# Patient Record
Sex: FEMALE | Race: ASIAN | Employment: OTHER | URBAN - METROPOLITAN AREA
[De-identification: names, ages, dates, MRNs, and addresses within clinical notes are randomized per-mention and may not be internally consistent; named-entity substitution may affect disease eponyms.]

---

## 2017-12-01 ENCOUNTER — HOSPITAL ENCOUNTER (OUTPATIENT)
Dept: LAB | Age: 67
Discharge: HOME | End: 2017-12-01
Payer: MEDICARE

## 2017-12-01 ENCOUNTER — OFFICE VISIT (OUTPATIENT)
Dept: FAMILY MEDICINE CLINIC | Age: 67
End: 2017-12-01

## 2017-12-01 VITALS
BODY MASS INDEX: 34.25 KG/M2 | TEMPERATURE: 98.1 F | WEIGHT: 181.4 LBS | SYSTOLIC BLOOD PRESSURE: 120 MMHG | HEART RATE: 54 BPM | RESPIRATION RATE: 17 BRPM | HEIGHT: 61 IN | OXYGEN SATURATION: 97 % | DIASTOLIC BLOOD PRESSURE: 80 MMHG

## 2017-12-01 DIAGNOSIS — I10 ESSENTIAL HYPERTENSION: ICD-10-CM

## 2017-12-01 DIAGNOSIS — Z78.0 POSTMENOPAUSAL: ICD-10-CM

## 2017-12-01 DIAGNOSIS — R74.01 TRANSAMINITIS: ICD-10-CM

## 2017-12-01 DIAGNOSIS — Z00.00 ROUTINE GENERAL MEDICAL EXAMINATION AT A HEALTH CARE FACILITY: ICD-10-CM

## 2017-12-01 DIAGNOSIS — E55.9 VITAMIN D DEFICIENCY: ICD-10-CM

## 2017-12-01 DIAGNOSIS — R73.9 ELEVATED BLOOD SUGAR: ICD-10-CM

## 2017-12-01 DIAGNOSIS — I10 ESSENTIAL HYPERTENSION: Primary | ICD-10-CM

## 2017-12-01 DIAGNOSIS — Z11.59 SPECIAL SCREENING EXAMINATION FOR VIRAL DISEASE: ICD-10-CM

## 2017-12-01 DIAGNOSIS — Z23 ENCOUNTER FOR IMMUNIZATION: ICD-10-CM

## 2017-12-01 DIAGNOSIS — Z12.39 SCREENING FOR BREAST CANCER: ICD-10-CM

## 2017-12-01 LAB
25(OH)D3 SERPL-MCNC: 20.1 NG/ML (ref 30–100)
ALBUMIN SERPL-MCNC: 3.9 G/DL (ref 3.4–5)
ALBUMIN/GLOB SERPL: 0.8 {RATIO} (ref 0.8–1.7)
ALP SERPL-CCNC: 75 U/L (ref 45–117)
ALT SERPL-CCNC: 29 U/L (ref 13–56)
ANION GAP SERPL CALC-SCNC: 8 MMOL/L (ref 3–18)
AST SERPL-CCNC: 39 U/L (ref 15–37)
BILIRUB SERPL-MCNC: 1.4 MG/DL (ref 0.2–1)
BUN SERPL-MCNC: 13 MG/DL (ref 7–18)
BUN/CREAT SERPL: 12 (ref 12–20)
CALCIUM SERPL-MCNC: 9.9 MG/DL (ref 8.5–10.1)
CHLORIDE SERPL-SCNC: 107 MMOL/L (ref 100–108)
CHOLEST SERPL-MCNC: 145 MG/DL
CO2 SERPL-SCNC: 26 MMOL/L (ref 21–32)
CREAT SERPL-MCNC: 1.12 MG/DL (ref 0.6–1.3)
ERYTHROCYTE [DISTWIDTH] IN BLOOD BY AUTOMATED COUNT: 14 % (ref 11.6–14.5)
GLOBULIN SER CALC-MCNC: 4.7 G/DL (ref 2–4)
GLUCOSE SERPL-MCNC: 86 MG/DL (ref 74–99)
HBA1C MFR BLD: 6 % (ref 4.2–5.6)
HCT VFR BLD AUTO: 43.1 % (ref 35–45)
HDLC SERPL-MCNC: 64 MG/DL (ref 40–60)
HDLC SERPL: 2.3 {RATIO} (ref 0–5)
HGB BLD-MCNC: 14 G/DL (ref 12–16)
LDLC SERPL CALC-MCNC: 58.2 MG/DL (ref 0–100)
LIPID PROFILE,FLP: ABNORMAL
MCH RBC QN AUTO: 29.7 PG (ref 24–34)
MCHC RBC AUTO-ENTMCNC: 32.5 G/DL (ref 31–37)
MCV RBC AUTO: 91.3 FL (ref 74–97)
PLATELET # BLD AUTO: 170 K/UL (ref 135–420)
PMV BLD AUTO: 10.8 FL (ref 9.2–11.8)
POTASSIUM SERPL-SCNC: 4 MMOL/L (ref 3.5–5.5)
PROT SERPL-MCNC: 8.6 G/DL (ref 6.4–8.2)
RBC # BLD AUTO: 4.72 M/UL (ref 4.2–5.3)
SODIUM SERPL-SCNC: 141 MMOL/L (ref 136–145)
TRIGL SERPL-MCNC: 114 MG/DL (ref ?–150)
VLDLC SERPL CALC-MCNC: 22.8 MG/DL
WBC # BLD AUTO: 6 K/UL (ref 4.6–13.2)

## 2017-12-01 PROCEDURE — 86803 HEPATITIS C AB TEST: CPT | Performed by: INTERNAL MEDICINE

## 2017-12-01 PROCEDURE — 83036 HEMOGLOBIN GLYCOSYLATED A1C: CPT | Performed by: INTERNAL MEDICINE

## 2017-12-01 PROCEDURE — 82306 VITAMIN D 25 HYDROXY: CPT | Performed by: INTERNAL MEDICINE

## 2017-12-01 PROCEDURE — 80053 COMPREHEN METABOLIC PANEL: CPT | Performed by: INTERNAL MEDICINE

## 2017-12-01 PROCEDURE — 80061 LIPID PANEL: CPT | Performed by: INTERNAL MEDICINE

## 2017-12-01 PROCEDURE — 36415 COLL VENOUS BLD VENIPUNCTURE: CPT | Performed by: INTERNAL MEDICINE

## 2017-12-01 PROCEDURE — 85027 COMPLETE CBC AUTOMATED: CPT | Performed by: INTERNAL MEDICINE

## 2017-12-01 RX ORDER — METOPROLOL TARTRATE 100 MG/1
TABLET ORAL 2 TIMES DAILY
COMMUNITY
End: 2017-12-01 | Stop reason: CLARIF

## 2017-12-01 RX ORDER — LORATADINE 10 MG/1
10 TABLET ORAL
Qty: 90 TAB | Refills: 3 | Status: SHIPPED | OUTPATIENT
Start: 2017-12-01 | End: 2018-09-13 | Stop reason: SDUPTHER

## 2017-12-01 RX ORDER — GUAIFENESIN AND DEXTROMETHORPHAN HYDROBROMIDE 1200; 60 MG/1; MG/1
TABLET, EXTENDED RELEASE ORAL
COMMUNITY
End: 2018-03-06 | Stop reason: ALTCHOICE

## 2017-12-01 RX ORDER — LOSARTAN POTASSIUM 100 MG/1
100 TABLET ORAL DAILY
Qty: 90 TAB | Refills: 1 | Status: SHIPPED | OUTPATIENT
Start: 2017-12-01 | End: 2018-05-01 | Stop reason: SDUPTHER

## 2017-12-01 RX ORDER — ACETAMINOPHEN 500 MG
500 TABLET ORAL
Qty: 90 TAB | Refills: 3 | Status: SHIPPED | OUTPATIENT
Start: 2017-12-01 | End: 2018-09-13 | Stop reason: ALTCHOICE

## 2017-12-01 RX ORDER — METOPROLOL SUCCINATE 100 MG/1
100 TABLET, EXTENDED RELEASE ORAL DAILY
Qty: 90 TAB | Refills: 1 | Status: SHIPPED | OUTPATIENT
Start: 2017-12-01 | End: 2018-05-01 | Stop reason: SDUPTHER

## 2017-12-01 RX ORDER — IBUPROFEN 800 MG/1
800 TABLET ORAL
Qty: 90 TAB | Refills: 1 | Status: SHIPPED | OUTPATIENT
Start: 2017-12-01 | End: 2020-08-17

## 2017-12-01 RX ORDER — IBUPROFEN 800 MG/1
TABLET ORAL
COMMUNITY
End: 2017-12-01 | Stop reason: SDUPTHER

## 2017-12-01 RX ORDER — CARBOXYMETHYLCELLULOSE SODIUM 5 MG/ML
1 SOLUTION/ DROPS OPHTHALMIC 2 TIMES DAILY
Qty: 30 ML | Refills: 11 | Status: SHIPPED | OUTPATIENT
Start: 2017-12-01 | End: 2018-09-13 | Stop reason: SDUPTHER

## 2017-12-01 RX ORDER — LOSARTAN POTASSIUM 100 MG/1
100 TABLET ORAL DAILY
COMMUNITY
End: 2017-12-01 | Stop reason: SDUPTHER

## 2017-12-01 RX ORDER — LORATADINE 10 MG/1
10 TABLET ORAL
COMMUNITY
End: 2017-12-01 | Stop reason: SDUPTHER

## 2017-12-01 RX ORDER — ACETAMINOPHEN 500 MG
TABLET ORAL
COMMUNITY
End: 2017-12-01 | Stop reason: SDUPTHER

## 2017-12-01 NOTE — PROGRESS NOTES
Eunice Quesada is a 78 y.o. female (: 1950) presenting to address:    Chief Complaint   Patient presents with    Establish Care       Vitals:    17 0956   BP: 120/80   Pulse: (!) 54   Resp: 17   Temp: 98.1 °F (36.7 °C)   TempSrc: Oral   SpO2: 97%   Weight: 181 lb 6.4 oz (82.3 kg)   Height: 5' 0.5\" (1.537 m)   PainSc:   4   PainLoc: Generalized       Hearing/Vision:   Vision Screening Comments: Patient refused. Just had eye exam    Learning Assessment:     Learning Assessment 2017   PRIMARY LEARNER Patient   HIGHEST LEVEL OF EDUCATION - PRIMARY LEARNER  4 YEARS OF COLLEGE   BARRIERS PRIMARY LEARNER NONE   CO-LEARNER CAREGIVER No   PRIMARY LANGUAGE OTHER (COMMENT)    NEED No   LEARNER PREFERENCE PRIMARY READING   LEARNING SPECIAL TOPICS none   ANSWERED BY patient   RELATIONSHIP SELF   ASSESSMENT COMMENT n/a     Depression Screening:     PHQ over the last two weeks 2017   Little interest or pleasure in doing things Not at all   Feeling down, depressed or hopeless Not at all   Total Score PHQ 2 0     Fall Risk Assessment:     Fall Risk Assessment, last 12 mths 2017   Able to walk? Yes   Fall in past 12 months? No     Abuse Screening:     Abuse Screening Questionnaire 2017   Do you ever feel afraid of your partner? N   Are you in a relationship with someone who physically or mentally threatens you? N   Is it safe for you to go home? Y       Advanced Directive:   1. Do you have an Advanced Directive? YES    2. Would you like information on Advanced Directives?  NO

## 2017-12-01 NOTE — PROGRESS NOTES
Immunization/s administered 12/1/2017 by Javon Cooley LPN with guardian's consent. Patient tolerated procedure well. No reactions noted.     Left deltoid, high dose influenza  Patient will return for future Pneumo 23

## 2017-12-01 NOTE — MR AVS SNAPSHOT
Visit Information Date & Time Provider Department Dept. Phone Encounter #  
 12/1/2017 10:00 AM Zoltan Lorenz, [employer] [phone] Upcoming Health Maintenance Date Due Hepatitis C Screening 1950 COLONOSCOPY 6/29/1969 BREAST CANCER SCRN MAMMOGRAM 6/29/2001 ZOSTER VACCINE AGE 60> 4/29/2011 OSTEOPOROSIS SCREENING (DEXA) 6/29/2016 Pneumococcal 65+ Low/Medium Risk (1 of 2 - PCV13) 6/29/2016 MEDICARE YEARLY EXAM 6/29/2016 Influenza Age 5 to Adult 8/1/2017 GLAUCOMA SCREENING Q2Y 11/17/2019 DTaP/Tdap/Td series (2 - Td) 12/1/2027 Allergies as of 12/1/2017  Never Reviewed Not on File Current Immunizations  Never Reviewed No immunizations on file. Not reviewed this visit You Were Diagnosed With   
  
 Codes Comments Essential hypertension    -  Primary ICD-10-CM: I10 
ICD-9-CM: 401.9 Elevated blood sugar     ICD-10-CM: R73.9 ICD-9-CM: 790.29 Transaminitis     ICD-10-CM: R74.0 ICD-9-CM: 790.4 Routine general medical examination at a health care facility     ICD-10-CM: Z00.00 ICD-9-CM: V70.0 Special screening examination for viral disease     ICD-10-CM: Z11.59 
ICD-9-CM: V73.99 Screening for breast cancer     ICD-10-CM: Z12.31 
ICD-9-CM: V76.10 Postmenopausal     ICD-10-CM: Z78.0 ICD-9-CM: V49.81 Vitals BP Pulse Temp Resp Height(growth percentile) Weight(growth percentile) 120/80 (BP 1 Location: Left arm, BP Patient Position: Sitting) (!) 54 98.1 °F (36.7 °C) (Oral) 17 5' 0.5\" (1.537 m) 181 lb 6.4 oz (82.3 kg) SpO2 BMI OB Status Smoking Status 97% 34.84 kg/m2 Menopause Never Smoker Vitals History BMI and BSA Data Body Mass Index Body Surface Area 34.84 kg/m 2 1.87 m 2 Your Updated Medication List  
  
   
This list is accurate as of: 12/1/17 10:27 AM.  Always use your most recent med list.  
  
  
  
  
 acetaminophen 500 mg tablet Commonly known as:  TYLENOL Take  by mouth every six (6) hours as needed for Pain. CLARITIN 10 mg tablet Generic drug:  loratadine Take 10 mg by mouth. ibuprofen 800 mg tablet Commonly known as:  MOTRIN Take  by mouth.  
  
 losartan 100 mg tablet Commonly known as:  COZAAR Take 100 mg by mouth daily. metoprolol tartrate 100 mg IR tablet Commonly known as:  LOPRESSOR Take  by mouth two (2) times a day. ER  
  
 MUCINEX DM 60-1,200 mg Tb12 Generic drug:  dextromethorphan-guaiFENesin Take  by mouth. REFRESH CLASSIC (PF) 1.4-0.6 % ophthalmic solution Generic drug:  polyvinyl alcohol-povidon(PF) Administer 1-2 Drops to both eyes as needed. To-Do List   
 12/01/2017 Lab:  CBC W/O DIFF   
  
 12/01/2017 Imaging:  DEXA BONE DENSITY STUDY AXIAL   
  
 12/01/2017 Lab:  HCV AB W/RFLX TO VERONICA   
  
 12/01/2017 Lab:  HEMOGLOBIN A1C W/O EAG   
  
 12/01/2017 Lab:  LIPID PANEL   
  
 12/01/2017 Imaging:  WILDER MAMMO BI SCREENING INCL CAD   
  
 12/01/2017 Lab:  METABOLIC PANEL, COMPREHENSIVE Introducing [facility]! Soraya Perez introduces [employer] patient portal. Now you can access parts of your medical record, email your doctor's office, and request medication refills online. 1. In your internet browser, go to [email or web address] 2. Click on the First Time User? Click Here link in the Sign In box. You will see the New Member Sign Up page. 3. Enter your [employer] Access Code exactly as it appears below. You will not need to use this code after youve completed the sign-up process. If you do not sign up before the expiration date, you must request a new code. · [employer] Access Code: [email or web address] Expires: 3/1/2018 10:27 AM 
 
4. Enter the last four digits of your Social Security Number (xxxx) and Date of Birth (mm/dd/yyyy) as indicated and click Submit. You will be taken to the next sign-up page. 5. Create a [employer] ID. This will be your [employer] login ID and cannot be changed, so think of one that is secure and easy to remember. 6. Create a [employer] password. You can change your password at any time. 7. Enter your Password Reset Question and Answer. This can be used at a later time if you forget your password. 8. Enter your e-mail address. You will receive e-mail notification when new information is available in [address] 9. Click Sign Up. You can now view and download portions of your medical record. 10. Click the Download Summary menu link to download a portable copy of your medical information. If you have questions, please visit the Frequently Asked Questions section of the [employer] website. Remember, [employer] is NOT to be used for urgent needs. For medical emergencies, dial 911. Now available from your iPhone and Android! Please provide this summary of care documentation to your next provider. Your primary care clinician is listed as Javed 13. If you have any questions after today's visit, please call [phone].
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
## 2017-12-02 LAB
HCV AB S/CO SERPL IA: <0.1 S/CO RATIO (ref 0–0.9)
HCV AB SERPL QL IA: NORMAL

## 2017-12-04 DIAGNOSIS — R74.01 TRANSAMINITIS: ICD-10-CM

## 2017-12-04 DIAGNOSIS — R73.9 ELEVATED BLOOD SUGAR: ICD-10-CM

## 2017-12-04 DIAGNOSIS — E55.9 VITAMIN D DEFICIENCY: ICD-10-CM

## 2017-12-04 DIAGNOSIS — I10 ESSENTIAL HYPERTENSION: ICD-10-CM

## 2017-12-04 DIAGNOSIS — R77.1 ELEVATED SERUM GLOBULIN LEVEL: Primary | ICD-10-CM

## 2017-12-05 ENCOUNTER — TELEPHONE (OUTPATIENT)
Dept: FAMILY MEDICINE CLINIC | Age: 67
End: 2017-12-05

## 2017-12-20 ENCOUNTER — OFFICE VISIT (OUTPATIENT)
Dept: FAMILY MEDICINE CLINIC | Age: 67
End: 2017-12-20

## 2017-12-20 VITALS
HEIGHT: 61 IN | TEMPERATURE: 97.7 F | OXYGEN SATURATION: 98 % | RESPIRATION RATE: 20 BRPM | HEART RATE: 57 BPM | BODY MASS INDEX: 34.21 KG/M2 | SYSTOLIC BLOOD PRESSURE: 148 MMHG | WEIGHT: 181.2 LBS | DIASTOLIC BLOOD PRESSURE: 90 MMHG

## 2017-12-20 DIAGNOSIS — M62.838 CERVICAL PARASPINAL MUSCLE SPASM: Primary | ICD-10-CM
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
## 2017-12-20 DIAGNOSIS — M54.6 ACUTE BILATERAL THORACIC BACK PAIN: ICD-10-CM

## 2017-12-20 RX ORDER — CYCLOBENZAPRINE HCL 5 MG
5 TABLET ORAL
Qty: 30 TAB | Refills: 0 | Status: SHIPPED | OUTPATIENT
Start: 2017-12-20 | End: 2018-01-02 | Stop reason: ALTCHOICE
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
## 2017-12-20 NOTE — PATIENT INSTRUCTIONS
Neck Spasm: Exercises  Your Care Instructions  Here are some examples of typical rehabilitation exercises for your condition. Start each exercise slowly. Ease off the exercise if you start to have pain. Your doctor or physical therapist will tell you when you can start these exercises and which ones will work best for you. How to do the exercises  Levator scapula stretch    1. Sit in a firm chair, or stand up straight. 2. Gently tilt your head toward your left shoulder. 3. Turn your head to look down into your armpit, bending your head slightly forward. Let the weight of your head stretch your neck muscles. 4. Hold for 15 to 30 seconds. 5. Return to your starting position. 6. Follow the same instructions above, but tilt your head toward your right shoulder. 7. Repeat 2 to 4 times toward each shoulder. Upper trapezius stretch    1. Sit in a firm chair, or stand up straight. 2. This stretch works best if you keep your shoulder down as you lean away from it. To help you remember to do this, start by relaxing your shoulders and lightly holding on to your thighs or your chair. 3. Tilt your head toward your shoulder and hold for 15 to 30 seconds. Let the weight of your head stretch your muscles. 4. If you would like a little added stretch, place your arm behind your back. Use the arm opposite of the direction you are tilting your head. For example, if you are tilting your head to the left, place your right arm behind your back. 5. Repeat 2 to 4 times toward each shoulder. Neck rotation    1. Sit in a firm chair, or stand up straight. 2. Keeping your chin level, turn your head to the right, and hold for 15 to 30 seconds. 3. Turn your head to the left, and hold for 15 to 30 seconds. 4. Repeat 2 to 4 times to each side. Chin tuck    1. Lie on the floor with a rolled-up towel under your neck. Your head should be touching the floor. 2. Slowly bring your chin toward the front of your neck.   3. Hold for a count of 6, and then relax for up to 10 seconds. 4. Repeat 8 to 12 times. Forward neck flexion    1. Sit in a firm chair, or stand up straight. 2. Bend your head forward. 3. Hold for 15 to 30 seconds, then return to your starting position. 4. Repeat 2 to 4 times. Follow-up care is a key part of your treatment and safety. Be sure to make and go to all appointments, and call your doctor if you are having problems. It's also a good idea to know your test results and keep a list of the medicines you take. Where can you learn more? Go to http://yon-bola.info/. Enter P962 in the search box to learn more about \"Neck Spasm: Exercises. \"  Current as of: March 21, 2017  Content Version: 11.4  © 8093-7771 [employer]. Care instructions adapted under license by [email or web address] (which disclaims liability or warranty for this information). If you have questions about a medical condition or this instruction, always ask your healthcare professional. [place] [address] any warranty or liability for your use of this information. Healthy Upper Back: Exercises  Your Care Instructions  Here are some examples of exercises for your upper back. Start each exercise slowly. Ease off the exercise if you start to have pain. Your doctor or physical therapist will tell you when you can start these exercises and which ones will work best for you. How to do the exercises  Lower neck and upper back stretch    8. Stretch your arms out in front of your body. Clasp one hand on top of your other hand. 9. Gently reach out so that you feel your shoulder blades stretching away from each other. 10. Gently bend your head forward. 11. Hold for 15 to 30 seconds. 12. Repeat 2 to 4 times. Midback stretch    If you have knee pain, do not do this exercise. 6. Kneel on the floor, and sit back on your ankles.   7. Lean forward, place your hands on the floor, and stretch your arms out in front of you. Rest your head between your arms. 8. Gently push your chest toward the floor, reaching as far in front of you as possible. 9. Hold for 15 to 30 seconds. 10. Repeat 2 to 4 times. Shoulder rolls    5. Sit comfortably with your feet shoulder-width apart. You can also do this exercise while standing. 6. Roll your shoulders up, then back, and then down in a smooth, circular motion. 7. Repeat 2 to 4 times. Wall push-up    5. Stand against a wall with your feet about 12 to 24 inches back from the wall. If you feel any pain when you do this exercise, stand closer to the wall. 6. Place your hands on the wall slightly wider apart than your shoulders, and lean forward. 7. Gently lean your body toward the wall. Then push back to your starting position. Keep the motion smooth and controlled. 8. Repeat 8 to 12 times. Resisted shoulder blade squeeze    For this exercise, you will need elastic exercise material, such as surgical tubing or Thera-Band.  5. Sit or stand, holding the band in both hands in front of you. Keep your elbows close to your sides, bent at a 90-degree angle. Your palms should face up. 6. Squeeze your shoulder blades together, and move your arms to the outside, stretching the band. Be sure to keep your elbows at your sides while you do this. 7. Relax. 8. Repeat 8 to 12 times. Resisted rows    For this exercise, you will need elastic exercise material, such as surgical tubing or Thera-Band. 1. Put the band around a solid object, such as a bedpost, at about waist level. Hold one end of the band in each hand. 2. With your elbows at your sides and bent to 90 degrees, pull the band back to move your shoulder blades toward each other. Return to the starting position. 3. Repeat 8 to 12 times. Follow-up care is a key part of your treatment and safety. Be sure to make and go to all appointments, and call your doctor if you are having problems.  It's also a good idea to know your test results and keep a list of the medicines you take. Where can you learn more? Go to http://yon-bola.info/. Enter U808 in the search box to learn more about \"Healthy Upper Back: Exercises. \"  Current as of: March 21, 2017  Content Version: 11.4  © 7003-8391 Health[employer], [employer]. Care instructions adapted under license by [employer] (which disclaims liability or warranty for this information). If you have questions about a medical condition or this instruction, always ask your healthcare professional. [address] any warranty or liability for your use of this information.
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
## 2018-01-02 ENCOUNTER — OFFICE VISIT (OUTPATIENT)
Dept: FAMILY MEDICINE CLINIC | Age: 68
End: 2018-01-02

## 2018-01-02 ENCOUNTER — HOSPITAL ENCOUNTER (OUTPATIENT)
Dept: LAB | Age: 68
Discharge: HOME | End: 2018-01-02
Payer: MEDICARE

## 2018-01-02 VITALS
RESPIRATION RATE: 18 BRPM | DIASTOLIC BLOOD PRESSURE: 90 MMHG | HEIGHT: 61 IN | OXYGEN SATURATION: 98 % | SYSTOLIC BLOOD PRESSURE: 140 MMHG | HEART RATE: 61 BPM | TEMPERATURE: 98.3 F | WEIGHT: 181 LBS | BODY MASS INDEX: 34.17 KG/M2

## 2018-01-02 DIAGNOSIS — R07.81 RIB PAIN ON LEFT SIDE: ICD-10-CM

## 2018-01-02 DIAGNOSIS — I10 ESSENTIAL HYPERTENSION: ICD-10-CM
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
## 2018-01-02 DIAGNOSIS — M25.551 HIP PAIN, BILATERAL: ICD-10-CM

## 2018-01-02 DIAGNOSIS — M25.552 HIP PAIN, BILATERAL: ICD-10-CM

## 2018-01-02 DIAGNOSIS — M25.561 ACUTE PAIN OF BOTH KNEES: ICD-10-CM

## 2018-01-02 DIAGNOSIS — M62.830 SPASM OF THORACIC BACK MUSCLE: Primary | ICD-10-CM

## 2018-01-02 DIAGNOSIS — M25.562 ACUTE PAIN OF BOTH KNEES: ICD-10-CM

## 2018-01-02 LAB
ANION GAP SERPL CALC-SCNC: 6 MMOL/L (ref 3–18)
BUN SERPL-MCNC: 19 MG/DL (ref 7–18)
BUN/CREAT SERPL: 20 (ref 12–20)
CALCIUM SERPL-MCNC: 9.8 MG/DL (ref 8.5–10.1)
CHLORIDE SERPL-SCNC: 109 MMOL/L (ref 100–108)
CO2 SERPL-SCNC: 28 MMOL/L (ref 21–32)
CREAT SERPL-MCNC: 0.95 MG/DL (ref 0.6–1.3)
GLUCOSE SERPL-MCNC: 82 MG/DL (ref 74–99)
POTASSIUM SERPL-SCNC: 4.2 MMOL/L (ref 3.5–5.5)
SODIUM SERPL-SCNC: 143 MMOL/L (ref 136–145)

## 2018-01-02 PROCEDURE — 80048 BASIC METABOLIC PNL TOTAL CA: CPT | Performed by: INTERNAL MEDICINE

## 2018-01-02 PROCEDURE — 36415 COLL VENOUS BLD VENIPUNCTURE: CPT | Performed by: INTERNAL MEDICINE

## 2018-01-02 NOTE — PROGRESS NOTES
Maggie Barbosa is a 78 y.o. female (: 1950) presenting to address:    Chief Complaint   Patient presents with    Back Pain    Neck Pain    Headache       Vitals:    18 0825   BP: 140/90   Pulse: 61   Resp: 18   Temp: 98.3 °F (36.8 °C)   TempSrc: Oral   SpO2: 98%   Weight: 181 lb (82.1 kg)   Height: 5' 0.5\" (1.537 m)   PainSc:  10 - Worst pain ever   PainLoc: Generalized       Learning Assessment:     Learning Assessment 2017   PRIMARY LEARNER Patient   HIGHEST LEVEL OF EDUCATION - PRIMARY LEARNER  4 YEARS OF COLLEGE   BARRIERS PRIMARY LEARNER NONE   CO-LEARNER CAREGIVER No   PRIMARY LANGUAGE OTHER (COMMENT)    NEED No   LEARNER PREFERENCE PRIMARY READING   LEARNING SPECIAL TOPICS none   ANSWERED BY patient   RELATIONSHIP SELF   ASSESSMENT COMMENT n/a     Depression Screening:     PHQ over the last two weeks 2017   Little interest or pleasure in doing things Not at all   Feeling down, depressed or hopeless Not at all   Total Score PHQ 2 0     Fall Risk Assessment:     Fall Risk Assessment, last 12 mths 2017   Able to walk? Yes   Fall in past 12 months? No     Abuse Screening:     Abuse Screening Questionnaire 2017   Do you ever feel afraid of your partner? N   Are you in a relationship with someone who physically or mentally threatens you? N   Is it safe for you to go home? Y     Coordination of Care Questionaire:   1. Have you been to the ER, urgent care clinic since your last visit? Hospitalized since your last visit? NO    2. Have you seen or consulted any other health care providers outside of the [address] [place] [address] since your last visit? Include any pap smears or colon screening. NO    Advanced Directive:   1. Do you have an Advanced Directive? YES    2. Would you like information on Advanced Directives?  NO
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
## 2018-02-08 ENCOUNTER — OFFICE VISIT (OUTPATIENT)
Dept: FAMILY MEDICINE CLINIC | Age: 68
End: 2018-02-08

## 2018-02-08 VITALS
RESPIRATION RATE: 18 BRPM | OXYGEN SATURATION: 98 % | TEMPERATURE: 98.1 F | WEIGHT: 183.2 LBS | BODY MASS INDEX: 34.59 KG/M2 | DIASTOLIC BLOOD PRESSURE: 88 MMHG | SYSTOLIC BLOOD PRESSURE: 110 MMHG | HEART RATE: 66 BPM | HEIGHT: 61 IN

## 2018-02-08 DIAGNOSIS — K12.0 APHTHAE, ORAL: ICD-10-CM

## 2018-02-08 DIAGNOSIS — R20.2 PARESTHESIA OF RIGHT LEG: ICD-10-CM

## 2018-02-08 DIAGNOSIS — M54.16 LUMBAR RADICULAR PAIN: Primary | ICD-10-CM

## 2018-02-08 RX ORDER — LIDOCAINE HYDROCHLORIDE 20 MG/ML
15 SOLUTION OROPHARYNGEAL AS NEEDED
Qty: 1 BOTTLE | Refills: 0 | Status: SHIPPED | OUTPATIENT
Start: 2018-02-08 | End: 2018-03-06 | Stop reason: ALTCHOICE

## 2018-02-08 RX ORDER — TRAMADOL HYDROCHLORIDE 50 MG/1
50 TABLET ORAL
Qty: 14 TAB | Refills: 0 | Status: SHIPPED | OUTPATIENT
Start: 2018-02-08 | End: 2018-03-06 | Stop reason: ALTCHOICE

## 2018-02-08 NOTE — PROGRESS NOTES
Assessment/Plan:    1. Lumbar radicular pain  -xray today. Schedule MRI. Tramadol prn.  - MRI LUMB SPINE WO CONT; Future  - XR SPINE LUMB COMP W BEND; Future  - traMADol (ULTRAM) 50 mg tablet; Take 1 Tab by mouth two (2) times daily as needed for Pain. Max Daily Amount: 100 mg. Dispense: 14 Tab; Refill: 0    2. apthous ulcer-  Lidocaine. The plan was discussed with the patient. The patient verbalized understanding and is in agreement with the plan. All medication potential side effects were discussed with the patient. Health Maintenance:   Health Maintenance   Topic Date Due    MEDICARE YEARLY EXAM  06/29/2016    Pneumococcal 65+ Low/Medium Risk (2 of 2 - PPSV23) 08/09/2017    COLONOSCOPY  06/13/2018    GLAUCOMA SCREENING Q2Y  11/17/2019    BREAST CANCER SCRN MAMMOGRAM  12/28/2019    Bone Densitometry  12/28/2022    DTaP/Tdap/Td series (3 - Td) 12/01/2027    Hepatitis C Screening  Completed    ZOSTER VACCINE AGE 60>  Completed    Influenza Age 5 to Adult  Completed       Elio Browning is a 78 y.o. female and presents with Leg Pain (With numbness/tingling) and Mouth Lesions     Subjective:  Pt c/o R leg paresthesias since 1/25. This started after aquatherapy with PT - it started as a \"cold sensation\" on her R leg. Then progressed to feeling hot. She has several musculoskeletal complaints, to include bilat knee, bilat hip pain, thoracic back pain. She states PT has helped her thoracic back pain. She now c/o bilat low back, worse on R. No numbness or weakness. She was recommended to see an orthopedic, but she didn't make that appt yet. She also c/o LLE paresthesias. Pt c/o R sided canker sore that is painful. States her \"tonsils were filled with pus\" and she had to clean them out with salt water. No sore throat. No f/c.     ROS:  Constitutional: No recent weight change. No weakness/fatigue. No f/c. Cardiovascular: No CP/palpitations. No WOODRUFF/orthopnea/PND.    Respiratory: No cough/sputum, dyspnea, wheezing. Genitourinary: No dysuria, urinary hesitancy, nocturia, hematuria. No incontinence. Musculoskeletal: + joint pain/stiffness. No muscle pain/tenderness. Neurological: No seizures/numbness/weakness. +paresthesias. The problem list was updated as a part of today's visit. Patient Active Problem List   Diagnosis Code    Essential hypertension I10    Elevated blood sugar R73.9    Transaminitis R74.0    Vitamin D deficiency E55.9       The PSH, FH were reviewed. SH:  Social History   Substance Use Topics    Smoking status: Never Smoker    Smokeless tobacco: Never Used    Alcohol use Yes      Comment: Socially       Medications/Allergies:  Current Outpatient Prescriptions on File Prior to Visit   Medication Sig Dispense Refill    polyvinyl alcohol-povidon,PF, (REFRESH CLASSIC, PF,) 1.4-0.6 % ophthalmic solution Administer 1-2 Drops to both eyes as needed.  dextromethorphan-guaiFENesin (MUCINEX DM) 60-1,200 mg Tb12 Take  by mouth.  losartan (COZAAR) 100 mg tablet Take 1 Tab by mouth daily. 90 Tab 1    ibuprofen (MOTRIN) 800 mg tablet Take 1 Tab by mouth every eight (8) hours as needed for Pain. 90 Tab 1    acetaminophen (TYLENOL) 500 mg tablet Take 1 Tab by mouth every six (6) hours as needed for Pain. 90 Tab 3    loratadine (CLARITIN) 10 mg tablet Take 1 Tab by mouth daily as needed for Allergies. 90 Tab 3    metoprolol succinate (TOPROL-XL) 100 mg tablet Take 1 Tab by mouth daily. 90 Tab 1    carboxymethylcellulose sodium (REFRESH TEARS) 0.5 % drop ophthalmic solution Administer 1 Drop to both eyes two (2) times a day. 30 mL 11     No current facility-administered medications on file prior to visit.          Allergies   Allergen Reactions    Allopurinol Nausea and Vomiting    Norvasc [Amlodipine] Itching and Cough       Objective:  Visit Vitals    /88 (BP 1 Location: Left arm, BP Patient Position: Sitting)    Pulse 66    Temp 98.1 °F (36.7 °C) (Oral)    Resp 18    Ht 5' 0.5\" (1.537 m)    Wt 183 lb 3.2 oz (83.1 kg)    SpO2 98%    BMI 35.19 kg/m2      Constitutional: Well developed, nourished, no distress, alert, obese habitus   CV: S1, S2.  RRR. No murmurs/rubs. No thrills palpated. No carotid bruits. Intact distal pulses. No edema. Pulm: No abnormalities on inspection. Clear to auscultation bilaterally. No wheezing/rhonchi. Normal effort. MS: Gait normal.  Joints without deformity/tenderness. Strength intact bilateral upper and lower ext. Normal ROM all extremities. Neuro: A/O x 3. No focal motor deficits. Speech normal. Decreased sensation over R anterior thigh, L2-3 distribution   Ent: apthous ulcer R buccal surface. No tonsillar exudate. Labwork and Ancillary Studies:    CBC w/Diff  Lab Results   Component Value Date/Time    WBC 6.0 12/01/2017 10:39 AM    HGB 14.0 12/01/2017 10:39 AM    PLATELET 223 55/06/5437 10:39 AM         Basic Metabolic Profile/LFTs  Lab Results   Component Value Date/Time    Sodium 143 01/02/2018 08:58 AM    Potassium 4.2 01/02/2018 08:58 AM    Chloride 109 (H) 01/02/2018 08:58 AM    CO2 28 01/02/2018 08:58 AM    Anion gap 6 01/02/2018 08:58 AM    Glucose 82 01/02/2018 08:58 AM    BUN 19 (H) 01/02/2018 08:58 AM    Creatinine 0.95 01/02/2018 08:58 AM    BUN/Creatinine ratio 20 01/02/2018 08:58 AM    GFR est AA >60 01/02/2018 08:58 AM    GFR est non-AA 59 (L) 01/02/2018 08:58 AM    Calcium 9.8 01/02/2018 08:58 AM      Lab Results   Component Value Date/Time    ALT (SGPT) 29 12/01/2017 10:39 AM    AST (SGOT) 39 (H) 12/01/2017 10:39 AM    Alk.  phosphatase 75 12/01/2017 10:39 AM    Bilirubin, total 1.4 (H) 12/01/2017 10:39 AM       Cholesterol  Lab Results   Component Value Date/Time    Cholesterol, total 145 12/01/2017 10:39 AM    HDL Cholesterol 64 (H) 12/01/2017 10:39 AM    LDL, calculated 58.2 12/01/2017 10:39 AM    Triglyceride 114 12/01/2017 10:39 AM    CHOL/HDL Ratio 2.3 12/01/2017 10:39 AM

## 2018-02-08 NOTE — PATIENT INSTRUCTIONS
Canker Sore: Care Instructions  Your Care Instructions  Canker sores are painful white sores in the mouth. They usually begin with a tingling feeling, followed by a red spot or bump that turns white. Canker sores appear most often on the tongue, inside the cheeks, and inside the lips. They can be very painful and can make talking, eating, and drinking difficult. A canker sore may form after an injury or stretching of tissues in the mouth, which can happen, for example, during a dental procedure or teeth cleaning. If you accidentally bite your tongue or the inside of your cheek, you may end up with a canker sore. Other possible causes are infection, certain foods, and stress. Canker sores are not contagious. The pain from your canker sore should decrease in 7 to 10 days, and it should heal completely in 1 to 3 weeks. In most cases, a canker sore will go away by itself. Home treatment can ease pain and discomfort. If you have a large or deep canker sore that does not seem to be getting better after 2 weeks, your doctor may prescribe medicine. Canker sores often come back again. Follow-up care is a key part of your treatment and safety. Be sure to make and go to all appointments, and call your doctor if you are having problems. It's also a good idea to know your test results and keep a list of the medicines you take. How can you care for yourself at home? · Drink cold liquids, such as water or iced tea, or eat flavored ice pops or frozen juices. Use a straw to keep the liquid from coming in contact with your canker sore. · Eat soft, bland foods that are easy to chew and swallow, such as ice cream, custard, applesauce, cottage cheese, macaroni and cheese, soft-cooked eggs, yogurt, or cream soups. · Cut foods into small pieces, or grind, mash, blend, or puree foods to make them easier to chew and swallow.   · While your canker sore heals, avoid coffee, chocolate, spicy and salty foods, citrus fruits, nuts, seeds, and tomatoes. · To soothe your canker sore and help it heal:  ¨ Use an over-the-counter numbing medicine, such as Orabase or Anbesol. ¨ Dab a bit of Milk of Magnesia on the canker sore 3 or 4 times a day. · Put ice on your sore to reduce the pain. · Take anti-inflammatory medicines to reduce pain, as needed. These include ibuprofen (Advil, Motrin) and naproxen (Aleve). Read and follow all instructions on the label. · Use a soft-bristle toothbrush, and brush your teeth well but carefully. · Do not smoke or use spit tobacco. Tobacco can cause mouth problems and slow healing. If you need help quitting, talk to your doctor about stop-smoking programs and medicines. These can increase your chances of quitting for good. When should you call for help? Call your doctor now or seek immediate medical care if:  ? · You have signs of infection, such as:  ¨ Increased pain, swelling, warmth, or redness. ¨ Red streaks leading from the area. ¨ Pus draining from the area. ¨ A fever. ? Watch closely for changes in your health, and be sure to contact your doctor if:  ? · You do not get better as expected. Where can you learn more? Go to http://yon-bola.info/. Enter C457 in the search box to learn more about \"Canker Sore: Care Instructions. \"  Current as of: May 12, 2017  Content Version: 11.4  © 3552-1475 [employer]. Care instructions adapted under license by [employer] (which disclaims liability or warranty for this information). If you have questions about a medical condition or this instruction, always ask your healthcare professional. [place] [address] any warranty or liability for your use of this information.

## 2018-02-08 NOTE — MR AVS SNAPSHOT
[address] [place] [address] 
 
 
 [address] [facility] [id number] [phone] Patient: Arelis Coe MRN: [id number] WNP:[id number] Visit Information Date & Time Provider Department Dept. Phone Encounter #  
 2/8/2018  2:00 PM Giovanna [facility] [phone] Your Appointments 6/8/2018 10:00 AM  
Follow Up with Nabila Sheets MD  
[facility]: 6 month f/u; f/u; r/s for 30 mins [address] [facility] [id number] [phone]  
  
   
 [address] Upcoming Health Maintenance Date Due  
 MEDICARE YEARLY EXAM 6/29/2016 Pneumococcal 65+ Low/Medium Risk (2 of 2 - PPSV23) 8/9/2017 COLONOSCOPY 6/13/2018 GLAUCOMA SCREENING Q2Y 11/17/2019 BREAST CANCER SCRN MAMMOGRAM 12/28/2019 Bone Densitometry 12/28/2022 DTaP/Tdap/Td series (3 - Td) 12/1/2027 Allergies as of 2/8/2018  Review Complete On: 2/8/2018 By: Nabila Sheets MD  
  
 Severity Noted Reaction Type Reactions Allopurinol Medium 12/20/2017    Nausea and Vomiting Norvasc [Amlodipine] Medium 12/20/2017    Itching, Cough Current Immunizations  Reviewed on 12/1/2017 Name Date Influenza High Dose Vaccine PF 12/1/2017 10:39 AM  
 Influenza Vaccine 9/18/2012, 9/26/2011, 10/16/2010, 10/24/2009, 11/25/2008 Novel Influenza-H1N1-09, Injectable 1/20/2010 Pneumococcal Conjugate (PCV-13) 8/9/2016 Pneumococcal Polysaccharide (PPSV-23) 4/18/2010 Tdap 9/1/2012 Zoster Vaccine, Live 9/1/2012 Not reviewed this visit You Were Diagnosed With   
  
 Codes Comments Lumbar radicular pain    -  Primary ICD-10-CM: M54.16 
ICD-9-CM: 724.4 Aphthae, oral     ICD-10-CM: K12.0 ICD-9-CM: 528.2 Vitals BP Pulse Temp Resp Height(growth percentile) Weight(growth percentile) 110/88 (BP 1 Location: Left arm, BP Patient Position: Sitting) 66 98.1 °F (36.7 °C) (Oral) 18 5' 0.5\" (1.537 m) 183 lb 3.2 oz (83.1 kg) SpO2 BMI OB Status Smoking Status 98% 35.19 kg/m2 Menopause Never Smoker Vitals History BMI and BSA Data Body Mass Index Body Surface Area  
 35.19 kg/m 2 1.88 m 2 Preferred Pharmacy Pharmacy Name Phone [address], [place] [phone] Your Updated Medication List  
  
   
This list is accurate as of: 2/8/18  2:26 PM.  Always use your most recent med list.  
  
  
  
  
 acetaminophen 500 mg tablet Commonly known as:  TYLENOL Take 1 Tab by mouth every six (6) hours as needed for Pain. carboxymethylcellulose sodium 0.5 % Drop ophthalmic solution Commonly known as:  REFRESH TEARS Administer 1 Drop to both eyes two (2) times a day. ibuprofen 800 mg tablet Commonly known as:  MOTRIN Take 1 Tab by mouth every eight (8) hours as needed for Pain.  
  
 lidocaine 2 % solution Commonly known as:  XYLOCAINE Take 15 mL by mouth as needed for Pain.  
  
 loratadine 10 mg tablet Commonly known as:  Analilia Juan Take 1 Tab by mouth daily as needed for Allergies. losartan 100 mg tablet Commonly known as:  COZAAR Take 1 Tab by mouth daily. metoprolol succinate 100 mg tablet Commonly known as:  TOPROL-XL Take 1 Tab by mouth daily. MUCINEX DM 60-1,200 mg Tb12 Generic drug:  dextromethorphan-guaiFENesin Take  by mouth. REFRESH CLASSIC (PF) 1.4-0.6 % ophthalmic solution Generic drug:  polyvinyl alcohol-povidon(PF) Administer 1-2 Drops to both eyes as needed. traMADol 50 mg tablet Commonly known as:  ULTRAM  
Take 1 Tab by mouth two (2) times daily as needed for Pain. Max Daily Amount: 100 mg. Prescriptions Printed  Refills  
 traMADol (ULTRAM) 50 mg tablet 0  
 Sig: Take 1 Tab by mouth two (2) times daily as needed for Pain. Max Daily Amount: 100 mg. Class: Print Route: Oral  
  
Prescriptions Sent to Pharmacy Refills  
 lidocaine (XYLOCAINE) 2 % solution 0 Sig: Take 15 mL by mouth as needed for Pain. Class: Normal  
 Pharmacy: [address], [place]  #: [phone] Route: Oral  
  
To-Do List   
 02/08/2018 Imaging:  MRI LUMB SPINE WO CONT   
  
 02/08/2018 Imaging:  XR SPINE LUMB COMP W BEND Patient Instructions Canker Sore: Care Instructions Your Care Instructions Canker sores are painful white sores in the mouth. They usually begin with a tingling feeling, followed by a red spot or bump that turns white. Canker sores appear most often on the tongue, inside the cheeks, and inside the lips. They can be very painful and can make talking, eating, and drinking difficult. A canker sore may form after an injury or stretching of tissues in the mouth, which can happen, for example, during a dental procedure or teeth cleaning. If you accidentally bite your tongue or the inside of your cheek, you may end up with a canker sore. Other possible causes are infection, certain foods, and stress. Canker sores are not contagious. The pain from your canker sore should decrease in 7 to 10 days, and it should heal completely in 1 to 3 weeks. In most cases, a canker sore will go away by itself. Home treatment can ease pain and discomfort. If you have a large or deep canker sore that does not seem to be getting better after 2 weeks, your doctor may prescribe medicine. Canker sores often come back again. Follow-up care is a key part of your treatment and safety. Be sure to make and go to all appointments, and call your doctor if you are having problems. It's also a good idea to know your test results and keep a list of the medicines you take. How can you care for yourself at home? · Drink cold liquids, such as water or iced tea, or eat flavored ice pops or frozen juices. Use a straw to keep the liquid from coming in contact with your canker sore. · Eat soft, bland foods that are easy to chew and swallow, such as ice cream, custard, applesauce, cottage cheese, macaroni and cheese, soft-cooked eggs, yogurt, or cream soups. · Cut foods into small pieces, or grind, mash, blend, or puree foods to make them easier to chew and swallow. · While your canker sore heals, avoid coffee, chocolate, spicy and salty foods, citrus fruits, nuts, seeds, and tomatoes. · To soothe your canker sore and help it heal: ¨ Use an over-the-counter numbing medicine, such as Orabase or Anbesol. ¨ Dab a bit of Milk of Magnesia on the canker sore 3 or 4 times a day. · Put ice on your sore to reduce the pain. · Take anti-inflammatory medicines to reduce pain, as needed. These include ibuprofen (Advil, Motrin) and naproxen (Aleve). Read and follow all instructions on the label. · Use a soft-bristle toothbrush, and brush your teeth well but carefully. · Do not smoke or use spit tobacco. Tobacco can cause mouth problems and slow healing. If you need help quitting, talk to your doctor about stop-smoking programs and medicines. These can increase your chances of quitting for good. When should you call for help? Call your doctor now or seek immediate medical care if: 
? · You have signs of infection, such as: 
¨ Increased pain, swelling, warmth, or redness. ¨ Red streaks leading from the area. ¨ Pus draining from the area. ¨ A fever. ? Watch closely for changes in your health, and be sure to contact your doctor if: 
? · You do not get better as expected. Where can you learn more? Go to http://yon-obla.info/. Enter C616 in the search box to learn more about \"Canker Sore: Care Instructions. \" Current as of: May 12, 2017 Content Version: 11.4 © 1913-5576 [employer]wise, Incorporated. Care instructions adapted under license by [employer] (which disclaims liability or warranty for this information). If you have questions about a medical condition or this instruction, always ask your healthcare professional. [address] any warranty or liability for your use of this information. Introducing [facility]! Stacy Vasquez introduces Bindo patient portal. Now you can access parts of your medical record, email your doctor's office, and request medication refills online. 1. In your internet browser, go to [email or web address] 2. Click on the First Time User? Click Here link in the Sign In box. You will see the New Member Sign Up page. 3. Enter your Bindo Access Code exactly as it appears below. You will not need to use this code after youve completed the sign-up process. If you do not sign up before the expiration date, you must request a new code. · Bindo Access Code: [email or web address] Expires: 3/1/2018 10:27 AM 
 
4. Enter the last four digits of your Social Security Number (xxxx) and Date of Birth (mm/dd/yyyy) as indicated and click Submit. You will be taken to the next sign-up page. 5. Create a Bindo ID. This will be your Bindo login ID and cannot be changed, so think of one that is secure and easy to remember. 6. Create a Bindo password. You can change your password at any time. 7. Enter your Password Reset Question and Answer. This can be used at a later time if you forget your password. 8. Enter your e-mail address. You will receive e-mail notification when new information is available in [address] 9. Click Sign Up. You can now view and download portions of your medical record. 10. Click the Download Summary menu link to download a portable copy of your medical information.  
 
If you have questions, please visit the Frequently Asked Questions section of the [employer]. Remember, [employer] is NOT to be used for urgent needs. For medical emergencies, dial 911. Now available from your iPhone and Android! Please provide this summary of care documentation to your next provider. Your primary care clinician is listed as Javed Vallecillo. If you have any questions after today's visit, please call [phone].

## 2018-02-08 NOTE — PROGRESS NOTES
Brad Foster is a 78 y.o. female (: 1950) presenting to address:    Chief Complaint   Patient presents with    Leg Pain     With numbness/tingling    Mouth Lesions       Vitals:    18 1347   BP: 110/88   Pulse: 66   Resp: 18   Temp: 98.1 °F (36.7 °C)   TempSrc: Oral   SpO2: 98%   Weight: 183 lb 3.2 oz (83.1 kg)   Height: 5' 0.5\" (1.537 m)   PainSc:  10 - Worst pain ever   PainLoc: Leg     Learning Assessment:     Learning Assessment 2017   PRIMARY LEARNER Patient   HIGHEST LEVEL OF EDUCATION - PRIMARY LEARNER  4 YEARS OF COLLEGE   BARRIERS PRIMARY LEARNER NONE   CO-LEARNER CAREGIVER No   PRIMARY LANGUAGE OTHER (COMMENT)    NEED No   LEARNER PREFERENCE PRIMARY READING   LEARNING SPECIAL TOPICS none   ANSWERED BY patient   RELATIONSHIP SELF   ASSESSMENT COMMENT n/a     Depression Screening:     PHQ over the last two weeks 2017   Little interest or pleasure in doing things Not at all   Feeling down, depressed or hopeless Not at all   Total Score PHQ 2 0     Fall Risk Assessment:     Fall Risk Assessment, last 12 mths 2017   Able to walk? Yes   Fall in past 12 months? No     Abuse Screening:     Abuse Screening Questionnaire 2017   Do you ever feel afraid of your partner? N   Are you in a relationship with someone who physically or mentally threatens you? N   Is it safe for you to go home? Y     Coordination of Care Questionaire:   1. Have you been to the ER, urgent care clinic since your last visit? Hospitalized since your last visit? NO    2. Have you seen or consulted any other health care providers outside of the [address] [place] [address] since your last visit? Include any pap smears or colon screening. YES. De[facility] PT    Advanced Directive:   1. Do you have an Advanced Directive? YES    2. Would you like information on Advanced Directives?  NO

## 2018-02-16 DIAGNOSIS — Z78.0 POSTMENOPAUSAL: ICD-10-CM

## 2018-02-16 DIAGNOSIS — Z12.39 SCREENING FOR BREAST CANCER: ICD-10-CM

## 2018-02-21 ENCOUNTER — TELEPHONE (OUTPATIENT)
Dept: FAMILY MEDICINE CLINIC | Age: 68
End: 2018-02-21

## 2018-02-21 DIAGNOSIS — N28.89 RENAL MASS, RIGHT: Primary | ICD-10-CM

## 2018-02-21 NOTE — TELEPHONE ENCOUNTER
Tell pt that MRI showed arthritis and some DDD. There is no impingment on any nerves, however. So, surgery is not warranted. They did note a cyst on the R kidney that needs to be better eval with CT. I've ordered the CT. Please have pt schedule.

## 2018-02-22 RX ORDER — GABAPENTIN 300 MG/1
300 CAPSULE ORAL 2 TIMES DAILY
Qty: 180 CAP | Refills: 0 | Status: SHIPPED | OUTPATIENT
Start: 2018-02-22 | End: 2018-03-06 | Stop reason: ALTCHOICE

## 2018-02-22 NOTE — TELEPHONE ENCOUNTER
Can do trial neurontin as ER recommended. Did she pick that up? Still need CT abd/pelvis to be done. Will also refer to neurology.   Also will order nerve study as MRI did not show any reason why she should be having nerve pain

## 2018-02-22 NOTE — TELEPHONE ENCOUNTER
Spoke to pt. She could not tolerate the Tramadol, states it gives her bad headaches. She went to ED after the visit. She was sent to ortho. She saw Dr. Nadia Rojas who gave her Vicodin, is waiting on MRI results and has follow up appt 2/28/18. Pt states she stopped the Vicodin because she feels too high. She said her legs are burning from her hips all the way done. She is in pain. She is currently wrapping her legs in ice to get some relief. She is asking for advice/medication. Faxing copy of MRI report to Dr. Nadia Rojas.

## 2018-02-22 NOTE — TELEPHONE ENCOUNTER
Returned pt call. She is taking a prednisone pack 10 mg from Dr. Johny Roman and has gabapentin 100 mg tid from the ED. Gabapentin was short term, ends 2/24/18. She gets slight relief but the evenings are the worse. Burning in her right hip and thigh. CT scheduled 2//24/18. Dr. Johny Rmoan ordered PT but is holding off until he sees her 2/28.

## 2018-02-27 ENCOUNTER — TELEPHONE (OUTPATIENT)
Dept: FAMILY MEDICINE CLINIC | Age: 68
End: 2018-02-27

## 2018-02-27 PROBLEM — K76.89 HEPATIC CYST: Status: ACTIVE | Noted: 2018-02-27

## 2018-02-27 PROBLEM — K80.20 CALCULUS OF GALLBLADDER WITHOUT CHOLECYSTITIS WITHOUT OBSTRUCTION: Status: ACTIVE | Noted: 2018-02-27

## 2018-03-06 ENCOUNTER — OFFICE VISIT (OUTPATIENT)
Dept: FAMILY MEDICINE CLINIC | Age: 68
End: 2018-03-06

## 2018-03-06 VITALS
SYSTOLIC BLOOD PRESSURE: 120 MMHG | RESPIRATION RATE: 18 BRPM | TEMPERATURE: 98.1 F | HEART RATE: 69 BPM | WEIGHT: 183 LBS | HEIGHT: 61 IN | DIASTOLIC BLOOD PRESSURE: 80 MMHG | OXYGEN SATURATION: 95 % | BODY MASS INDEX: 34.55 KG/M2

## 2018-03-06 DIAGNOSIS — M48.061 SPINAL STENOSIS OF LUMBAR REGION WITHOUT NEUROGENIC CLAUDICATION: ICD-10-CM

## 2018-03-06 DIAGNOSIS — Z00.00 MEDICARE ANNUAL WELLNESS VISIT, SUBSEQUENT: ICD-10-CM

## 2018-03-06 DIAGNOSIS — M54.16 LUMBAR RADICULAR PAIN: Primary | ICD-10-CM

## 2018-03-06 DIAGNOSIS — Z12.11 SCREEN FOR COLON CANCER: ICD-10-CM

## 2018-03-06 NOTE — PROGRESS NOTES
Brad Foster is a 78 y.o. female (: 1950) presenting to address:    Chief Complaint   Patient presents with    ED Follow-up       Vitals:    18 1007   BP: 120/80   Pulse: 69   Resp: 18   Temp: 98.1 °F (36.7 °C)   TempSrc: Oral   SpO2: 95%   Weight: 183 lb (83 kg)   Height: 5' 0.5\" (1.537 m)   PainSc:   6   PainLoc: Generalized       Learning Assessment:     Learning Assessment 2017   PRIMARY LEARNER Patient   HIGHEST LEVEL OF EDUCATION - PRIMARY LEARNER  4 YEARS OF COLLEGE   BARRIERS PRIMARY LEARNER NONE   CO-LEARNER CAREGIVER No   PRIMARY LANGUAGE OTHER (COMMENT)    NEED No   LEARNER PREFERENCE PRIMARY READING   LEARNING SPECIAL TOPICS none   ANSWERED BY patient   RELATIONSHIP SELF   ASSESSMENT COMMENT n/a     Depression Screening:     PHQ over the last two weeks 2017   Little interest or pleasure in doing things Not at all   Feeling down, depressed or hopeless Not at all   Total Score PHQ 2 0     Fall Risk Assessment:     Fall Risk Assessment, last 12 mths 2017   Able to walk? Yes   Fall in past 12 months? No     Abuse Screening:     Abuse Screening Questionnaire 2017   Do you ever feel afraid of your partner? N   Are you in a relationship with someone who physically or mentally threatens you? N   Is it safe for you to go home? Y     Coordination of Care Questionaire:   1. Have you been to the ER, urgent care clinic since your last visit? Hospitalized since your last visit? YES [facility]-ED 3/1/18    2. Have you seen or consulted any other health care providers outside of the [address] [place] [address] since your last visit? Include any pap smears or colon screening. NO    Advanced Directive:   1. Do you have an Advanced Directive? YES    2. Would you like information on Advanced Directives?  NO

## 2018-03-06 NOTE — MR AVS SNAPSHOT
Rayray Bell 
 
 
 [address] [facility] [address] [phone] Patient: John Masterson MRN: [id number] PVH:[id number]/07/[id number] Visit Information Date & Time Provider Department Dept. Phone Encounter #  
 3/6/2018 10:00 AM Giovanna Patel [phone] Your Appointments 6/8/2018 10:00 AM  
Follow Up with Gabi Raymond MD  
3 [facility] Appt Note: 6 month f/u; f/u; r/s for 30 mins [address] [facility] [id number] [phone]  
  
   
 [address] Upcoming Health Maintenance Date Due Pneumococcal 65+ Low/Medium Risk (2 of 2 - PPSV23) 8/9/2017 COLONOSCOPY 6/13/2018 MEDICARE YEARLY EXAM 3/7/2019 GLAUCOMA SCREENING Q2Y 11/17/2019 BREAST CANCER SCRN MAMMOGRAM 12/28/2019 Bone Densitometry 12/28/2022 DTaP/Tdap/Td series (3 - Td) 12/1/2027 Allergies as of 3/6/2018  Review Complete On: 3/6/2018 By: Gabi Raymond MD  
  
 Severity Noted Reaction Type Reactions Allopurinol Medium 12/20/2017    Nausea and Vomiting Norvasc [Amlodipine] Medium 12/20/2017    Itching, Cough Current Immunizations  Reviewed on 12/1/2017 Name Date Influenza High Dose Vaccine PF 12/1/2017 10:39 AM  
 Influenza Vaccine 9/18/2012, 9/26/2011, 10/16/2010, 10/24/2009, 11/25/2008 Novel Influenza-H1N1-09, Injectable 1/20/2010 Pneumococcal Conjugate (PCV-13) 8/9/2016 Pneumococcal Polysaccharide (PPSV-23) 4/18/2010 Tdap 9/1/2012 Zoster Vaccine, Live 9/1/2012 Not reviewed this visit You Were Diagnosed With   
  
 Codes Comments Lumbar radicular pain    -  Primary ICD-10-CM: M54.16 
ICD-9-CM: 724.4  Spinal stenosis of lumbar region without neurogenic claudication     ICD-10-CM: M48.061 
ICD-9-CM: 724.02   
 Medicare annual wellness visit, subsequent     ICD-10-CM: Z00.00 ICD-9-CM: V70.0 Screen for colon cancer     ICD-10-CM: Z12.11 ICD-9-CM: V76.51 Vitals BP Pulse Temp Resp Height(growth percentile) Weight(growth percentile) 120/80 (BP 1 Location: Left arm, BP Patient Position: Sitting) 69 98.1 °F (36.7 °C) (Oral) 18 5' 0.5\" (1.537 m) 183 lb (83 kg) SpO2 BMI OB Status Smoking Status 95% 35.15 kg/m2 Menopause Never Smoker Vitals History BMI and BSA Data Body Mass Index Body Surface Area  
 35.15 kg/m 2 1.88 m 2 Preferred Pharmacy Pharmacy Name Phone [address] [facility], [address] [phone] Your Updated Medication List  
  
   
This list is accurate as of 3/6/18 11:22 AM.  Always use your most recent med list.  
  
  
  
  
 acetaminophen 500 mg tablet Commonly known as:  TYLENOL Take 1 Tab by mouth every six (6) hours as needed for Pain. carboxymethylcellulose sodium 0.5 % Drop ophthalmic solution Commonly known as:  REFRESH TEARS Administer 1 Drop to both eyes two (2) times a day. ibuprofen 800 mg tablet Commonly known as:  MOTRIN Take 1 Tab by mouth every eight (8) hours as needed for Pain.  
  
 loratadine 10 mg tablet Commonly known as:  Teresita James Take 1 Tab by mouth daily as needed for Allergies. losartan 100 mg tablet Commonly known as:  COZAAR Take 1 Tab by mouth daily. metoprolol succinate 100 mg tablet Commonly known as:  TOPROL-XL Take 1 Tab by mouth daily. REFRESH CLASSIC (PF) 1.4-0.6 % ophthalmic solution Generic drug:  polyvinyl alcohol-povidon(PF) Administer 1-2 Drops to both eyes as needed. We Performed the Following REFERRAL TO GASTROENTEROLOGY [STI56 Custom] Comments:  
 Please evaluate patient for colonoscopy. Due 6/2018 Referral Information Referral ID Referred By Referred To [id number] [facility], formerly [facility] [place] 1946 [address], [address] Phone: [phone] Fax: [phone] Visits Status Start Date End Date 1 New Request 3/6/18 3/6/19 If your referral has a status of pending review or denied, additional information will be sent to support the outcome of this decision. Patient Instructions Medicare Wellness Visit, Female The best way to live healthy is to have a healthy lifestyle by eating a well-balanced diet, exercising regularly, limiting alcohol and stopping smoking. Regular physical exams and screening tests are another way to keep healthy. Preventive exams provided by your health care provider can find health problems before they become diseases or illnesses. Preventive services including immunizations, screening tests, monitoring and exams can help you take care of your own health. All people over age 72 should have a pneumovax  and and a prevnar shot to prevent pneumonia. These are once in a lifetime unless you and your provider decide differently. All people over 65 should have a yearly flu shot and a tetanus vaccine every 10 years. A bone mass density to screen for osteoporosis or thinning of the bones should be done every 2 years after 65. Screening for diabetes mellitus with a blood sugar test should be done every year. Glaucoma is a disease of the eye due to increased ocular pressure that can lead to blindness and it should be done every year by an eye professional. 
 
Cardiovascular screening tests that check for elevated lipids (fatty part of blood) which can lead to heart disease and strokes should be done every 5 years. Colorectal screening that evaluates for blood or polyps in your colon should be done yearly as a stool test or every five years as a flexible sigmoidoscope or every 10 years as a colonoscopy up to age 76. Breast cancer screening with a mammogram is recommended biennially  for women age 54-69. Screening for cervical cancer with a pap smear and pelvic exam is recommended for women after age 72 years every 2 years up to age 79 or when the provider and patient decide to stop. If there is a history of cervical abnormalities or other increased risk for cancer then the test is recommended yearly. Hepatitis C screening is also recommended for anyone born between [birth year] through [address]. A shingles vaccine is also recommended once in a lifetime after age 61. Your Medicare Wellness Exam is recommended annually. Here is a list of your current Health Maintenance items with a due date: 
Health Maintenance Due Topic Date Due  
 Annual Well Visit  06/29/2016  Pneumococcal Vaccine (2 of 2 - PPSV23) 08/09/2017  Colonoscopy  06/13/2018 Low-Fat Diet for Gallbladder Disease: Care Instructions Your Care Instructions When you eat, the gallbladder releases bile, which helps you digest the fat in food. If you have an inflamed gallbladder, this may cause pain. A low-fat diet may give your gallbladder a rest so you can start to heal. Your doctor and dietitian can help you make an eating plan that does not irritate your digestive system. Always talk with your doctor or dietitian before you make changes in your diet. Follow-up care is a key part of your treatment and safety. Be sure to make and go to all appointments, and call your doctor if you are having problems. It's also a good idea to know your test results and keep a list of the medicines you take. How can you care for yourself at home? · Eat many small meals and snacks each day instead of three large meals. · Choose lean meats. ¨ Eat no more than 5 to 6½ ounces of meat a day. ¨ Cut off all fat you can see. ¨ Eat chicken and turkey without the skin.  
¨ Many types of fish, such as salmon, lake trout, tuna, and herring, provide healthy omega-3 fat. But, avoid fish canned in oil, such as sardines in olive oil. ¨ Bake, broil, or grill meats, poultry, or fish instead of frying them in butter or fat. · Drink or eat nonfat or low-fat milk, yogurt, cheese, or other milk products each day. ¨ Read the labels on cheeses, and choose those with less than 5 grams of fat an ounce. ¨ Try fat-free sour cream, cream cheese, or yogurt. ¨ Avoid cream soups and cream sauces on pasta. ¨ Eat low-fat ice cream, frozen yogurt, or sorbet. Avoid regular ice cream. 
· Eat whole-grain cereals, breads, crackers, rice, or pasta. Avoid high-fat foods such as croissants, scones, biscuits, waffles, doughnuts, muffins, granola, and high-fat breads. · Flavor your foods with herbs and spices (such as basil, tarragon, or mint), fat-free sauces, or lemon juice instead of butter. You can also use butter substitutes, fat-free mayonnaise, or fat-free dressing. · Try applesauce, prune puree, or mashed bananas to replace some or all of the fat when you bake. · Limit fats and oils, such as butter, margarine, mayonnaise, and salad dressing, to no more than 1 tablespoon a meal. 
· Avoid high-fat foods, such as: 
¨ Chocolate, whole milk, ice cream, and processed cheese. ¨ Fried or buttered foods. ¨ Sausage, salami, and rivera. ¨ Cinnamon rolls, cakes, pies, cookies, and other pastries. ¨ Prepared snack foods, such as potato chips, nut and granola bars, and mixed nuts. ¨ Coconut and avocado. · Learn how to read food labels for serving sizes and ingredients. Fast-food and convenience-food meals often have lots of fat. Where can you learn more? Go to http://yon-bola.info/. Enter D980 in the search box to learn more about \"Low-Fat Diet for Gallbladder Disease: Care Instructions. \" Current as of: May 12, 2017 Content Version: 11.4 © 3218-5579 Healthwise, Incorporated.  Care instructions adapted under license by [address] (which disclaims liability or warranty for this information). If you have questions about a medical condition or this instruction, always ask your healthcare professional. [address] any warranty or liability for your use of this information. Introducing [facility]! Andrew Hassan introduces [employer] patient portal. Now you can access parts of your medical record, email your doctor's office, and request medication refills online. 1. In your internet browser, go to [email or web address] 2. Click on the First Time User? Click Here link in the Sign In box. You will see the New Member Sign Up page. 3. Enter your [employer] Access Code exactly as it appears below. You will not need to use this code after youve completed the sign-up process. If you do not sign up before the expiration date, you must request a new code. · [employer] Access Code: [email or web address] Expires: 6/4/2018 11:21 AM 
 
4. Enter the last four digits of your Social Security Number (xxxx) and Date of Birth (mm/dd/yyyy) as indicated and click Submit. You will be taken to the next sign-up page. 5. Create a [employer] ID. This will be your [employer] login ID and cannot be changed, so think of one that is secure and easy to remember. 6. Create a [employer] password. You can change your password at any time. 7. Enter your Password Reset Question and Answer. This can be used at a later time if you forget your password. 8. Enter your e-mail address. You will receive e-mail notification when new information is available in [address] 9. Click Sign Up. You can now view and download portions of your medical record. 10. Click the Download Summary menu link to download a portable copy of your medical information. If you have questions, please visit the Frequently Asked Questions section of the [employer] website.  Remember, [employer] is NOT to be used for urgent needs. For medical emergencies, dial 911. Now available from your iPhone and Android! Please provide this summary of care documentation to your next provider. Your primary care clinician is listed as Javed Vallecillo. If you have any questions after today's visit, please call [phone].

## 2018-03-06 NOTE — PROGRESS NOTES
Assessment/Plan:    1. Lumbar radicular pain and spinal stenosis  -followed by ortho. Has epidural and PT scheduled. Intol neurontin and pain medications. Trial biofreeze. Temporary DMV disability placard application completed. 2. Medicare annual wellness visit, subsequent    4. Screen for colon cancer  - REFERRAL TO GASTROENTEROLOGY    The plan was discussed with the patient. The patient verbalized understanding and is in agreement with the plan. All medication potential side effects were discussed with the patient. Health Maintenance:   Health Maintenance   Topic Date Due    Pneumococcal 65+ Low/Medium Risk (2 of 2 - PPSV23) 08/09/2017    COLONOSCOPY  06/13/2018    MEDICARE YEARLY EXAM  03/07/2019    GLAUCOMA SCREENING Q2Y  11/17/2019    BREAST CANCER SCRN MAMMOGRAM  12/28/2019    Bone Densitometry  12/28/2022    DTaP/Tdap/Td series (3 - Td) 12/01/2027    Hepatitis C Screening  Completed    ZOSTER VACCINE AGE 60>  Completed    Influenza Age 5 to Adult  Completed     Chalmer Libman is a 78 y.o. female and presents with ED Follow-up     Subjective:  Pt is c/o ongoing low back pain with radicular sx. She was seen by ortho, who has recommended epidural injections - this is set up for 3/27. Also has appt with neuro upcoming. Also has physical therapy started by ortho. She was intol of neurontin b/c it \"causes shaking\" and palpitations- she stopped it over a week ago. She gets more relief from applying ice to the area. She was intol of several versions of pain meds (given by ER and ortho). She notes dizziness. Worse when she moves her head. ROS:  Constitutional: No recent weight change. No weakness/fatigue. No f/c. Cardiovascular: No CP/+palpitations. No WOODRUFF/orthopnea/PND. Respiratory: No cough/sputum, dyspnea, wheezing. Gastointestinal: No dysphagia, reflux. No n/v. No constipation/diarrhea. No melena/rectal bleeding.    Genitourinary: No dysuria, urinary hesitancy, nocturia, hematuria. No incontinence. Musculoskeletal: + joint pain/stiffness. No muscle pain/tenderness. Neurological: No seizures/numbness/weakness. + paresthesias. The problem list was updated as a part of today's visit. Patient Active Problem List   Diagnosis Code    Essential hypertension I10    Elevated blood sugar R73.9    Transaminitis R74.0    Vitamin D deficiency E55.9    Calculus of gallbladder without cholecystitis without obstruction K80.20    Hepatic cyst K76.89     The PSH, FH were reviewed. SH:  Social History   Substance Use Topics    Smoking status: Never Smoker    Smokeless tobacco: Never Used    Alcohol use Yes      Comment: Socially       Medications/Allergies:  Current Outpatient Prescriptions on File Prior to Visit   Medication Sig Dispense Refill    polyvinyl alcohol-povidon,PF, (REFRESH CLASSIC, PF,) 1.4-0.6 % ophthalmic solution Administer 1-2 Drops to both eyes as needed.  losartan (COZAAR) 100 mg tablet Take 1 Tab by mouth daily. 90 Tab 1    ibuprofen (MOTRIN) 800 mg tablet Take 1 Tab by mouth every eight (8) hours as needed for Pain. 90 Tab 1    acetaminophen (TYLENOL) 500 mg tablet Take 1 Tab by mouth every six (6) hours as needed for Pain. 90 Tab 3    loratadine (CLARITIN) 10 mg tablet Take 1 Tab by mouth daily as needed for Allergies. 90 Tab 3    metoprolol succinate (TOPROL-XL) 100 mg tablet Take 1 Tab by mouth daily. 90 Tab 1    carboxymethylcellulose sodium (REFRESH TEARS) 0.5 % drop ophthalmic solution Administer 1 Drop to both eyes two (2) times a day. 30 mL 11     No current facility-administered medications on file prior to visit.          Allergies   Allergen Reactions    Allopurinol Nausea and Vomiting    Norvasc [Amlodipine] Itching and Cough       Objective:  Visit Vitals    /80 (BP 1 Location: Left arm, BP Patient Position: Sitting)    Pulse 69    Temp 98.1 °F (36.7 °C) (Oral)    Resp 18    Ht 5' 0.5\" (1.537 m)    Wt 183 lb (83 kg)    SpO2 95%    BMI 35.15 kg/m2      Constitutional: Well developed, nourished, no distress, alert, obese habitus   CV: S1, S2.  RRR. No murmurs/rubs. No thrills palpated. No carotid bruits. Intact distal pulses. No edema. Pulm: No abnormalities on inspection. Clear to auscultation bilaterally. No wheezing/rhonchi. Normal effort. MS: Gait normal.  Joints without deformity/tenderness. Strength intact bilateral upper and lower ext. Normal ROM all extremities. Neuro: A/O x 3. No focal motor or sensory deficits. Speech normal.   Psych: Appropriate affect, judgement and insight. Short-term memory intact. Labwork and Ancillary Studies:    CBC w/Diff  Lab Results   Component Value Date/Time    WBC 6.0 12/01/2017 10:39 AM    HGB 14.0 12/01/2017 10:39 AM    PLATELET 320 87/14/9145 10:39 AM         Basic Metabolic Profile/LFTs  Lab Results   Component Value Date/Time    Sodium 143 01/02/2018 08:58 AM    Potassium 4.2 01/02/2018 08:58 AM    Chloride 109 (H) 01/02/2018 08:58 AM    CO2 28 01/02/2018 08:58 AM    Anion gap 6 01/02/2018 08:58 AM    Glucose 82 01/02/2018 08:58 AM    BUN 19 (H) 01/02/2018 08:58 AM    Creatinine 0.95 01/02/2018 08:58 AM    BUN/Creatinine ratio 20 01/02/2018 08:58 AM    GFR est AA >60 01/02/2018 08:58 AM    GFR est non-AA 59 (L) 01/02/2018 08:58 AM    Calcium 9.8 01/02/2018 08:58 AM      Lab Results   Component Value Date/Time    ALT (SGPT) 29 12/01/2017 10:39 AM    AST (SGOT) 39 (H) 12/01/2017 10:39 AM    Alk. phosphatase 75 12/01/2017 10:39 AM    Bilirubin, total 1.4 (H) 12/01/2017 10:39 AM           This is a Subsequent Medicare Annual Wellness Exam (AWV) (Performed 12 months after IPPE or effective date of Medicare Part B enrollment)    I have reviewed the patient's medical history in detail and updated the computerized patient record.      History     Past Medical History:   Diagnosis Date    Chronic pain     Fatty liver     Hypertension     Prediabetes Past Surgical History:   Procedure Laterality Date    HX CATARACT REMOVAL      HX  SECTION      HX DILATION AND CURETTAGE      BTL     Current Outpatient Prescriptions   Medication Sig Dispense Refill    polyvinyl alcohol-povidon,PF, (REFRESH CLASSIC, PF,) 1.4-0.6 % ophthalmic solution Administer 1-2 Drops to both eyes as needed.  losartan (COZAAR) 100 mg tablet Take 1 Tab by mouth daily. 90 Tab 1    ibuprofen (MOTRIN) 800 mg tablet Take 1 Tab by mouth every eight (8) hours as needed for Pain. 90 Tab 1    acetaminophen (TYLENOL) 500 mg tablet Take 1 Tab by mouth every six (6) hours as needed for Pain. 90 Tab 3    loratadine (CLARITIN) 10 mg tablet Take 1 Tab by mouth daily as needed for Allergies. 90 Tab 3    metoprolol succinate (TOPROL-XL) 100 mg tablet Take 1 Tab by mouth daily. 90 Tab 1    carboxymethylcellulose sodium (REFRESH TEARS) 0.5 % drop ophthalmic solution Administer 1 Drop to both eyes two (2) times a day.  30 mL 11     Allergies   Allergen Reactions    Allopurinol Nausea and Vomiting    Norvasc [Amlodipine] Itching and Cough     Family History   Problem Relation Age of Onset   [address] [facility] Arthritis-rheumatoid Mother     Stroke Mother     Heart Disease Father     Diabetes Father     Hypertension Father     Diabetes Brother     Hypertension Brother     Kidney Disease Brother      Social History   Substance Use Topics    Smoking status: Never Smoker    Smokeless tobacco: Never Used    Alcohol use Yes      Comment: Socially     Patient Active Problem List   Diagnosis Code    Essential hypertension I10    Elevated blood sugar R73.9    Transaminitis R74.0    Vitamin D deficiency E55.9    Calculus of gallbladder without cholecystitis without obstruction K80.20    Hepatic cyst K76.89    Lumbar radicular pain M54.16     Depression Risk Factor Screening:     PHQ over the last two weeks 2017   Little interest or pleasure in doing things Not at all   Feeling down, depressed or hopeless Not at all   Total Score PHQ 2 0     Alcohol Risk Factor Screening: You do not drink alcohol or very rarely. Functional Ability and Level of Safety:   Hearing Loss  Hearing is good. Activities of Daily Living  The home contains: handrails  Patient does total self care    Fall Risk  Fall Risk Assessment, last 12 mths 12/1/2017   Able to walk? Yes   Fall in past 12 months? No     Abuse Screen  Patient is not abused    Cognitive Screening   Evaluation of Cognitive Function:  Has your family/caregiver stated any concerns about your memory: no  Normal    Patient Care Team   Patient Care Team:  Lacy Esqueda MD as PCP - General (Internal Medicine)    Assessment/Plan   Education and counseling provided:  Are appropriate based on today's review and evaluation  End-of-Life planning (with patient's consent)  Pneumococcal Vaccine    Diagnoses and all orders for this visit:    1. Lumbar radicular pain    2. Spinal stenosis of lumbar region without neurogenic claudication    3.  Medicare annual wellness visit, subsequent        Health Maintenance Due   Topic Date Due    Pneumococcal 65+ Low/Medium Risk (2 of 2 - PPSV23) 08/09/2017    COLONOSCOPY  06/13/2018

## 2018-03-06 NOTE — PATIENT INSTRUCTIONS
Medicare Wellness Visit, Female    The best way to live healthy is to have a healthy lifestyle by eating a well-balanced diet, exercising regularly, limiting alcohol and stopping smoking. Regular physical exams and screening tests are another way to keep healthy. Preventive exams provided by your health care provider can find health problems before they become diseases or illnesses. Preventive services including immunizations, screening tests, monitoring and exams can help you take care of your own health. All people over age 72 should have a pneumovax  and and a prevnar shot to prevent pneumonia. These are once in a lifetime unless you and your provider decide differently. All people over 65 should have a yearly flu shot and a tetanus vaccine every 10 years. A bone mass density to screen for osteoporosis or thinning of the bones should be done every 2 years after 65. Screening for diabetes mellitus with a blood sugar test should be done every year. Glaucoma is a disease of the eye due to increased ocular pressure that can lead to blindness and it should be done every year by an eye professional.    Cardiovascular screening tests that check for elevated lipids (fatty part of blood) which can lead to heart disease and strokes should be done every 5 years. Colorectal screening that evaluates for blood or polyps in your colon should be done yearly as a stool test or every five years as a flexible sigmoidoscope or every 10 years as a colonoscopy up to age 76. Breast cancer screening with a mammogram is recommended biennially  for women age 54-69. Screening for cervical cancer with a pap smear and pelvic exam is recommended for women after age 72 years every 2 years up to age 79 or when the provider and patient decide to stop. If there is a history of cervical abnormalities or other increased risk for cancer then the test is recommended yearly.     Hepatitis C screening is also recommended for anyone born between [birth year] through [address]. A shingles vaccine is also recommended once in a lifetime after age 61. Your Medicare Wellness Exam is recommended annually. Here is a list of your current Health Maintenance items with a due date:  Health Maintenance Due   Topic Date Due    Annual Well Visit  06/29/2016    Pneumococcal Vaccine (2 of 2 - PPSV23) 08/09/2017    Colonoscopy  06/13/2018          Low-Fat Diet for Gallbladder Disease: Care Instructions  Your Care Instructions    When you eat, the gallbladder releases bile, which helps you digest the fat in food. If you have an inflamed gallbladder, this may cause pain. A low-fat diet may give your gallbladder a rest so you can start to heal. Your doctor and dietitian can help you make an eating plan that does not irritate your digestive system. Always talk with your doctor or dietitian before you make changes in your diet. Follow-up care is a key part of your treatment and safety. Be sure to make and go to all appointments, and call your doctor if you are having problems. It's also a good idea to know your test results and keep a list of the medicines you take. How can you care for yourself at home? · Eat many small meals and snacks each day instead of three large meals. · Choose lean meats. ¨ Eat no more than 5 to 6½ ounces of meat a day. ¨ Cut off all fat you can see. ¨ Eat chicken and turkey without the skin. ¨ Many types of fish, such as salmon, lake trout, tuna, and herring, provide healthy omega-3 fat. But, avoid fish canned in oil, such as sardines in olive oil. ¨ Bake, broil, or grill meats, poultry, or fish instead of frying them in butter or fat. · Drink or eat nonfat or low-fat milk, yogurt, cheese, or other milk products each day. ¨ Read the labels on cheeses, and choose those with less than 5 grams of fat an ounce. ¨ Try fat-free sour cream, cream cheese, or yogurt. ¨ Avoid cream soups and cream sauces on pasta.   ¨ Eat low-fat ice cream, frozen yogurt, or sorbet. Avoid regular ice cream.  · Eat whole-grain cereals, breads, crackers, rice, or pasta. Avoid high-fat foods such as croissants, scones, biscuits, waffles, doughnuts, muffins, granola, and high-fat breads. · Flavor your foods with herbs and spices (such as basil, tarragon, or mint), fat-free sauces, or lemon juice instead of butter. You can also use butter substitutes, fat-free mayonnaise, or fat-free dressing. · Try applesauce, prune puree, or mashed bananas to replace some or all of the fat when you bake. · Limit fats and oils, such as butter, margarine, mayonnaise, and salad dressing, to no more than 1 tablespoon a meal.  · Avoid high-fat foods, such as:  ¨ Chocolate, whole milk, ice cream, and processed cheese. ¨ Fried or buttered foods. ¨ Sausage, salami, and rivera. ¨ Cinnamon rolls, cakes, pies, cookies, and other pastries. ¨ Prepared snack foods, such as potato chips, nut and granola bars, and mixed nuts. ¨ Coconut and avocado. · Learn how to read food labels for serving sizes and ingredients. Fast-food and convenience-food meals often have lots of fat. Where can you learn more? Go to http://yon-bola.info/. Enter M415 in the search box to learn more about \"Low-Fat Diet for Gallbladder Disease: Care Instructions. \"  Current as of: May 12, 2017  Content Version: 11.4  © 4502-6646 [employer]. Care instructions adapted under license by [employer] (which disclaims liability or warranty for this information). If you have questions about a medical condition or this instruction, always ask your healthcare professional. [address] any warranty or liability for your use of this information.

## 2018-03-06 NOTE — ACP (ADVANCE CARE PLANNING)
Advance Care Planning (ACP) Provider Conversation Snapshot    Date of ACP Conversation: 03/06/18  Persons included in Conversation:  patient  Length of ACP Conversation in minutes:  <16 minutes (Non-Billable)    Authorized Decision Maker (if patient is incapable of making informed decisions):    This person is:    [phone]       For Patients with Decision Making Capacity:   full code    Conversation Outcomes / Follow-Up Plan:   Recommended completion of Advance Directive form after review of ACP materials and conversation with prospective healthcare agent

## 2018-03-08 DIAGNOSIS — M54.16 LUMBAR RADICULAR PAIN: ICD-10-CM

## 2018-03-15 DIAGNOSIS — N28.89 RENAL MASS, RIGHT: ICD-10-CM

## 2018-03-20 ENCOUNTER — TELEPHONE (OUTPATIENT)
Dept: FAMILY MEDICINE CLINIC | Age: 68
End: 2018-03-20

## 2018-03-20 NOTE — TELEPHONE ENCOUNTER
Pt is asking if she can use fish oil, magnesium and zinc? Will they interact with her meds? Please advise.

## 2018-03-22 ENCOUNTER — TELEPHONE (OUTPATIENT)
Dept: FAMILY MEDICINE CLINIC | Age: 68
End: 2018-03-22

## 2018-03-22 NOTE — TELEPHONE ENCOUNTER
Patient just reporting that she has a GI appointment with with Dr. Laine Alexander on 4/16/2018 at 1 pm.

## 2018-05-01 DIAGNOSIS — I10 ESSENTIAL HYPERTENSION: ICD-10-CM

## 2018-05-01 RX ORDER — LOSARTAN POTASSIUM 100 MG/1
100 TABLET ORAL DAILY
Qty: 90 TAB | Refills: 1 | Status: SHIPPED | OUTPATIENT
Start: 2018-05-01 | End: 2018-09-13 | Stop reason: ALTCHOICE

## 2018-05-01 RX ORDER — METOPROLOL SUCCINATE 100 MG/1
100 TABLET, EXTENDED RELEASE ORAL DAILY
Qty: 90 TAB | Refills: 1 | Status: SHIPPED | OUTPATIENT
Start: 2018-05-01 | End: 2018-09-13 | Stop reason: ALTCHOICE

## 2018-05-08 ENCOUNTER — TELEPHONE (OUTPATIENT)
Dept: FAMILY MEDICINE CLINIC | Age: 68
End: 2018-05-08

## 2018-05-08 NOTE — TELEPHONE ENCOUNTER
Home health orders need office visit. Can address at 5/16 ov. Has ortho spine surgeon, this should also be addressed by them.

## 2018-05-08 NOTE — TELEPHONE ENCOUNTER
Pt daughter states she has falls at home, a lot of specialist visits and ER visits and would like for a referral home health or rehabilition. If unable to receive the following doctors daughter will admit her into Er. Please advise.

## 2018-05-29 PROBLEM — S24.109A THORACIC SPINAL CORD INJURY (HCC): Status: ACTIVE | Noted: 2018-05-29

## 2018-06-07 ENCOUNTER — TELEPHONE (OUTPATIENT)
Dept: FAMILY MEDICINE CLINIC | Age: 68
End: 2018-06-07

## 2018-06-07 ENCOUNTER — HOSPITAL ENCOUNTER (OUTPATIENT)
Dept: REHABILITATION | Age: 68
End: 2018-07-17
Attending: PHYSICAL MEDICINE & REHABILITATION | Admitting: PHYSICAL MEDICINE & REHABILITATION

## 2018-06-07 DIAGNOSIS — N31.9 NEUROMUSCULAR DYSFUNCTION OF BLADDER: ICD-10-CM

## 2018-06-07 DIAGNOSIS — M79.89 SWELLING OF LOWER LIMB: ICD-10-CM

## 2018-06-07 DIAGNOSIS — M47.14 OTHER SPONDYLOSIS WITH MYELOPATHY, THORACIC REGION: ICD-10-CM

## 2018-06-07 DIAGNOSIS — R13.19 OTHER DYSPHAGIA: ICD-10-CM

## 2018-06-07 DIAGNOSIS — D62 ACUTE POSTHEMORRHAGIC ANEMIA: ICD-10-CM

## 2018-06-07 LAB

## 2018-06-07 PROCEDURE — 81001 URINALYSIS AUTO W/SCOPE: CPT | Performed by: PHYSICAL MEDICINE & REHABILITATION

## 2018-06-07 PROCEDURE — 87086 URINE CULTURE/COLONY COUNT: CPT | Performed by: PHYSICAL MEDICINE & REHABILITATION

## 2018-06-07 NOTE — TELEPHONE ENCOUNTER
Pt  called and canceled pt appt for tomorrow 6/8/18 with Dr. Mona Harris due to wife being admitted into hospital. Pt states that once she is discharged she will be admitted into a rehab facility in [place], [place] and is unsure of how long she will be staying there. Pt  also wanted to inform Dr. Mona Harris that pt is not paralyzed from the waist down.

## 2018-06-08 LAB
25(OH)D3 SERPL-MCNC: 18.9 NG/ML (ref 30–100)
ALBUMIN SERPL-MCNC: 2 G/DL (ref 3.5–5)
ALBUMIN/GLOB SERPL: 0.5 {RATIO} (ref 1.1–2.2)
ALP SERPL-CCNC: 87 U/L (ref 45–117)
ALT SERPL-CCNC: 39 U/L (ref 12–78)
ANION GAP SERPL CALC-SCNC: 6 MMOL/L (ref 5–15)
AST SERPL-CCNC: 33 U/L (ref 15–37)
BACTERIA SPEC CULT: NORMAL
BILIRUB SERPL-MCNC: 0.7 MG/DL (ref 0.2–1)
BUN SERPL-MCNC: 9 MG/DL (ref 6–20)
BUN/CREAT SERPL: 19 (ref 12–20)
CALCIUM SERPL-MCNC: 8.8 MG/DL (ref 8.5–10.1)
CC UR VC: NORMAL
CHLORIDE SERPL-SCNC: 108 MMOL/L (ref 97–108)
CO2 SERPL-SCNC: 26 MMOL/L (ref 21–32)
CREAT SERPL-MCNC: 0.47 MG/DL (ref 0.55–1.02)
ERYTHROCYTE [DISTWIDTH] IN BLOOD BY AUTOMATED COUNT: 18.6 % (ref 11.5–14.5)
GLOBULIN SER CALC-MCNC: 4 G/DL (ref 2–4)
GLUCOSE SERPL-MCNC: 88 MG/DL (ref 65–100)
HCT VFR BLD AUTO: 26.9 % (ref 35–47)
HGB BLD-MCNC: 8.4 G/DL (ref 11.5–16)
MAGNESIUM SERPL-MCNC: 2 MG/DL (ref 1.6–2.4)
MCH RBC QN AUTO: 31.2 PG (ref 26–34)
MCHC RBC AUTO-ENTMCNC: 31.2 G/DL (ref 30–36.5)
MCV RBC AUTO: 100 FL (ref 80–99)
NRBC # BLD: 0 K/UL (ref 0–0.01)
NRBC BLD-RTO: 0 PER 100 WBC
PLATELET # BLD AUTO: 107 K/UL (ref 150–400)
PMV BLD AUTO: 10.1 FL (ref 8.9–12.9)
POTASSIUM SERPL-SCNC: 3.9 MMOL/L (ref 3.5–5.1)
PROT SERPL-MCNC: 6 G/DL (ref 6.4–8.2)
RBC # BLD AUTO: 2.69 M/UL (ref 3.8–5.2)
SERVICE CMNT-IMP: NORMAL
SODIUM SERPL-SCNC: 140 MMOL/L (ref 136–145)
WBC # BLD AUTO: 3.2 K/UL (ref 3.6–11)

## 2018-06-08 PROCEDURE — 83735 ASSAY OF MAGNESIUM: CPT | Performed by: PHYSICAL MEDICINE & REHABILITATION

## 2018-06-08 PROCEDURE — 85027 COMPLETE CBC AUTOMATED: CPT | Performed by: PHYSICAL MEDICINE & REHABILITATION

## 2018-06-08 PROCEDURE — 36415 COLL VENOUS BLD VENIPUNCTURE: CPT | Performed by: PHYSICAL MEDICINE & REHABILITATION

## 2018-06-08 PROCEDURE — 82306 VITAMIN D 25 HYDROXY: CPT | Performed by: PHYSICAL MEDICINE & REHABILITATION

## 2018-06-08 PROCEDURE — 80053 COMPREHEN METABOLIC PANEL: CPT | Performed by: PHYSICAL MEDICINE & REHABILITATION

## 2018-06-12 LAB
ANION GAP SERPL CALC-SCNC: 6 MMOL/L (ref 5–15)
BUN SERPL-MCNC: 12 MG/DL (ref 6–20)
BUN/CREAT SERPL: 24 (ref 12–20)
CALCIUM SERPL-MCNC: 9.3 MG/DL (ref 8.5–10.1)
CHLORIDE SERPL-SCNC: 108 MMOL/L (ref 97–108)
CO2 SERPL-SCNC: 27 MMOL/L (ref 21–32)
CREAT SERPL-MCNC: 0.49 MG/DL (ref 0.55–1.02)
ERYTHROCYTE [DISTWIDTH] IN BLOOD BY AUTOMATED COUNT: 18.2 % (ref 11.5–14.5)
GLUCOSE SERPL-MCNC: 99 MG/DL (ref 65–100)
HCT VFR BLD AUTO: 27.7 % (ref 35–47)
HGB BLD-MCNC: 8.6 G/DL (ref 11.5–16)
MCH RBC QN AUTO: 31.3 PG (ref 26–34)
MCHC RBC AUTO-ENTMCNC: 31 G/DL (ref 30–36.5)
MCV RBC AUTO: 100.7 FL (ref 80–99)
NRBC # BLD: 0.03 K/UL (ref 0–0.01)
NRBC BLD-RTO: 0.6 PER 100 WBC
PLATELET # BLD AUTO: 179 K/UL (ref 150–400)
PMV BLD AUTO: 9.7 FL (ref 8.9–12.9)
POTASSIUM SERPL-SCNC: 3.8 MMOL/L (ref 3.5–5.1)
RBC # BLD AUTO: 2.75 M/UL (ref 3.8–5.2)
SODIUM SERPL-SCNC: 141 MMOL/L (ref 136–145)
WBC # BLD AUTO: 4.8 K/UL (ref 3.6–11)

## 2018-06-12 PROCEDURE — 80048 BASIC METABOLIC PNL TOTAL CA: CPT | Performed by: PHYSICAL MEDICINE & REHABILITATION

## 2018-06-12 PROCEDURE — 36415 COLL VENOUS BLD VENIPUNCTURE: CPT | Performed by: PHYSICAL MEDICINE & REHABILITATION

## 2018-06-12 PROCEDURE — 85027 COMPLETE CBC AUTOMATED: CPT | Performed by: PHYSICAL MEDICINE & REHABILITATION

## 2018-06-15 ENCOUNTER — APPOINTMENT (OUTPATIENT)
Dept: ULTRASOUND IMAGING | Age: 68
End: 2018-06-15
Attending: PHYSICAL MEDICINE & REHABILITATION

## 2018-06-15 PROCEDURE — 93970 EXTREMITY STUDY: CPT

## 2018-06-19 LAB
ANION GAP SERPL CALC-SCNC: 7 MMOL/L (ref 5–15)
APPEARANCE UR: CLEAR
BACTERIA URNS QL MICRO: ABNORMAL /HPF
BILIRUB UR QL: NEGATIVE
BUN SERPL-MCNC: 10 MG/DL (ref 6–20)
BUN/CREAT SERPL: 19 (ref 12–20)
CALCIUM SERPL-MCNC: 9.6 MG/DL (ref 8.5–10.1)
CHLORIDE SERPL-SCNC: 106 MMOL/L (ref 97–108)
CO2 SERPL-SCNC: 27 MMOL/L (ref 21–32)
COLOR UR: ABNORMAL
CREAT SERPL-MCNC: 0.54 MG/DL (ref 0.55–1.02)
EPITH CASTS URNS QL MICRO: ABNORMAL /LPF
ERYTHROCYTE [DISTWIDTH] IN BLOOD BY AUTOMATED COUNT: 16.5 % (ref 11.5–14.5)
GLUCOSE SERPL-MCNC: 96 MG/DL (ref 65–100)
GLUCOSE UR STRIP.AUTO-MCNC: NEGATIVE MG/DL
HCT VFR BLD AUTO: 30.6 % (ref 35–47)
HGB BLD-MCNC: 9.6 G/DL (ref 11.5–16)
HGB UR QL STRIP: ABNORMAL
HYALINE CASTS URNS QL MICRO: ABNORMAL /LPF (ref 0–5)
KETONES UR QL STRIP.AUTO: NEGATIVE MG/DL
LEUKOCYTE ESTERASE UR QL STRIP.AUTO: ABNORMAL
MCH RBC QN AUTO: 30.6 PG (ref 26–34)
MCHC RBC AUTO-ENTMCNC: 31.4 G/DL (ref 30–36.5)
MCV RBC AUTO: 97.5 FL (ref 80–99)
NITRITE UR QL STRIP.AUTO: NEGATIVE
NRBC # BLD: 0 K/UL (ref 0–0.01)
NRBC BLD-RTO: 0 PER 100 WBC
PH UR STRIP: 7 [PH] (ref 5–8)
PLATELET # BLD AUTO: 209 K/UL (ref 150–400)
PMV BLD AUTO: 9.5 FL (ref 8.9–12.9)
POTASSIUM SERPL-SCNC: 4.1 MMOL/L (ref 3.5–5.1)
PROT UR STRIP-MCNC: NEGATIVE MG/DL
RBC # BLD AUTO: 3.14 M/UL (ref 3.8–5.2)
RBC #/AREA URNS HPF: ABNORMAL /HPF (ref 0–5)
SODIUM SERPL-SCNC: 140 MMOL/L (ref 136–145)
SP GR UR REFRACTOMETRY: <1.005 (ref 1–1.03)
UROBILINOGEN UR QL STRIP.AUTO: 0.2 EU/DL (ref 0.2–1)
WBC # BLD AUTO: 11.8 K/UL (ref 3.6–11)
WBC URNS QL MICRO: ABNORMAL /HPF (ref 0–4)

## 2018-06-19 PROCEDURE — 87077 CULTURE AEROBIC IDENTIFY: CPT | Performed by: PHYSICAL MEDICINE & REHABILITATION

## 2018-06-19 PROCEDURE — 87086 URINE CULTURE/COLONY COUNT: CPT | Performed by: PHYSICAL MEDICINE & REHABILITATION

## 2018-06-19 PROCEDURE — 36415 COLL VENOUS BLD VENIPUNCTURE: CPT | Performed by: PHYSICAL MEDICINE & REHABILITATION

## 2018-06-19 PROCEDURE — 85027 COMPLETE CBC AUTOMATED: CPT | Performed by: PHYSICAL MEDICINE & REHABILITATION

## 2018-06-19 PROCEDURE — 81001 URINALYSIS AUTO W/SCOPE: CPT | Performed by: PHYSICAL MEDICINE & REHABILITATION

## 2018-06-19 PROCEDURE — 80048 BASIC METABOLIC PNL TOTAL CA: CPT | Performed by: PHYSICAL MEDICINE & REHABILITATION

## 2018-06-19 PROCEDURE — 87186 SC STD MICRODIL/AGAR DIL: CPT | Performed by: PHYSICAL MEDICINE & REHABILITATION

## 2018-06-21 LAB
BACTERIA SPEC CULT: ABNORMAL
BACTERIA SPEC CULT: ABNORMAL
CC UR VC: ABNORMAL
SERVICE CMNT-IMP: ABNORMAL

## 2018-06-27 LAB
ANION GAP SERPL CALC-SCNC: 8 MMOL/L (ref 5–15)
BUN SERPL-MCNC: 10 MG/DL (ref 6–20)
BUN/CREAT SERPL: 20 (ref 12–20)
CALCIUM SERPL-MCNC: 10.5 MG/DL (ref 8.5–10.1)
CHLORIDE SERPL-SCNC: 108 MMOL/L (ref 97–108)
CO2 SERPL-SCNC: 28 MMOL/L (ref 21–32)
CREAT SERPL-MCNC: 0.5 MG/DL (ref 0.55–1.02)
ERYTHROCYTE [DISTWIDTH] IN BLOOD BY AUTOMATED COUNT: 15.6 % (ref 11.5–14.5)
GLUCOSE SERPL-MCNC: 92 MG/DL (ref 65–100)
HCT VFR BLD AUTO: 33.6 % (ref 35–47)
HGB BLD-MCNC: 10.6 G/DL (ref 11.5–16)
MCH RBC QN AUTO: 30.5 PG (ref 26–34)
MCHC RBC AUTO-ENTMCNC: 31.5 G/DL (ref 30–36.5)
MCV RBC AUTO: 96.6 FL (ref 80–99)
NRBC # BLD: 0 K/UL (ref 0–0.01)
NRBC BLD-RTO: 0 PER 100 WBC
PLATELET # BLD AUTO: 165 K/UL (ref 150–400)
PMV BLD AUTO: 10.1 FL (ref 8.9–12.9)
POTASSIUM SERPL-SCNC: 3.8 MMOL/L (ref 3.5–5.1)
RBC # BLD AUTO: 3.48 M/UL (ref 3.8–5.2)
SODIUM SERPL-SCNC: 144 MMOL/L (ref 136–145)
WBC # BLD AUTO: 9.1 K/UL (ref 3.6–11)

## 2018-06-27 PROCEDURE — 36415 COLL VENOUS BLD VENIPUNCTURE: CPT | Performed by: PHYSICAL MEDICINE & REHABILITATION

## 2018-06-27 PROCEDURE — 85027 COMPLETE CBC AUTOMATED: CPT | Performed by: PHYSICAL MEDICINE & REHABILITATION

## 2018-06-27 PROCEDURE — 80048 BASIC METABOLIC PNL TOTAL CA: CPT | Performed by: PHYSICAL MEDICINE & REHABILITATION

## 2018-06-28 ENCOUNTER — APPOINTMENT (OUTPATIENT)
Dept: GENERAL RADIOLOGY | Age: 68
End: 2018-06-28
Attending: PHYSICAL MEDICINE & REHABILITATION

## 2018-06-28 LAB
APPEARANCE UR: CLEAR
BACTERIA URNS QL MICRO: NEGATIVE /HPF
BILIRUB UR QL: NEGATIVE
CAOX CRY URNS QL MICRO: ABNORMAL
COLOR UR: ABNORMAL
EPITH CASTS URNS QL MICRO: ABNORMAL /LPF
GLUCOSE UR STRIP.AUTO-MCNC: NEGATIVE MG/DL
HGB UR QL STRIP: ABNORMAL
KETONES UR QL STRIP.AUTO: NEGATIVE MG/DL
LEUKOCYTE ESTERASE UR QL STRIP.AUTO: NEGATIVE
NITRITE UR QL STRIP.AUTO: NEGATIVE
PH UR STRIP: 6.5 [PH] (ref 5–8)
PROT UR STRIP-MCNC: NEGATIVE MG/DL
RBC #/AREA URNS HPF: ABNORMAL /HPF (ref 0–5)
SP GR UR REFRACTOMETRY: 1.01 (ref 1–1.03)
UROBILINOGEN UR QL STRIP.AUTO: 1 EU/DL (ref 0.2–1)
WBC URNS QL MICRO: ABNORMAL /HPF (ref 0–4)

## 2018-06-28 PROCEDURE — 72072 X-RAY EXAM THORAC SPINE 3VWS: CPT

## 2018-06-28 PROCEDURE — 87086 URINE CULTURE/COLONY COUNT: CPT | Performed by: PHYSICAL MEDICINE & REHABILITATION

## 2018-06-28 PROCEDURE — 81001 URINALYSIS AUTO W/SCOPE: CPT | Performed by: PHYSICAL MEDICINE & REHABILITATION

## 2018-06-30 LAB
BACTERIA SPEC CULT: NORMAL
CC UR VC: NORMAL
SERVICE CMNT-IMP: NORMAL

## 2018-07-03 ENCOUNTER — APPOINTMENT (OUTPATIENT)
Dept: GENERAL RADIOLOGY | Age: 68
End: 2018-07-03
Attending: PHYSICAL MEDICINE & REHABILITATION

## 2018-07-03 PROCEDURE — 72072 X-RAY EXAM THORAC SPINE 3VWS: CPT

## 2018-07-05 ENCOUNTER — APPOINTMENT (OUTPATIENT)
Dept: GENERAL RADIOLOGY | Age: 68
End: 2018-07-05
Attending: PHYSICAL MEDICINE & REHABILITATION

## 2018-07-05 PROCEDURE — 72070 X-RAY EXAM THORAC SPINE 2VWS: CPT

## 2018-07-09 LAB
ALBUMIN SERPL-MCNC: 2.5 G/DL (ref 3.5–5)
ALBUMIN/GLOB SERPL: 0.7 {RATIO} (ref 1.1–2.2)
ALP SERPL-CCNC: 66 U/L (ref 45–117)
ALT SERPL-CCNC: 15 U/L (ref 12–78)
ANION GAP SERPL CALC-SCNC: 6 MMOL/L (ref 5–15)
AST SERPL-CCNC: 18 U/L (ref 15–37)
BILIRUB SERPL-MCNC: 0.4 MG/DL (ref 0.2–1)
BUN SERPL-MCNC: 11 MG/DL (ref 6–20)
BUN/CREAT SERPL: 21 (ref 12–20)
CALCIUM SERPL-MCNC: 10 MG/DL (ref 8.5–10.1)
CHLORIDE SERPL-SCNC: 108 MMOL/L (ref 97–108)
CO2 SERPL-SCNC: 30 MMOL/L (ref 21–32)
CREAT SERPL-MCNC: 0.53 MG/DL (ref 0.55–1.02)
ERYTHROCYTE [DISTWIDTH] IN BLOOD BY AUTOMATED COUNT: 15.1 % (ref 11.5–14.5)
GLOBULIN SER CALC-MCNC: 3.8 G/DL (ref 2–4)
GLUCOSE SERPL-MCNC: 91 MG/DL (ref 65–100)
HCT VFR BLD AUTO: 31.3 % (ref 35–47)
HGB BLD-MCNC: 10 G/DL (ref 11.5–16)
MCH RBC QN AUTO: 30.1 PG (ref 26–34)
MCHC RBC AUTO-ENTMCNC: 31.9 G/DL (ref 30–36.5)
MCV RBC AUTO: 94.3 FL (ref 80–99)
NRBC # BLD: 0 K/UL (ref 0–0.01)
NRBC BLD-RTO: 0 PER 100 WBC
PLATELET # BLD AUTO: 130 K/UL (ref 150–400)
PMV BLD AUTO: 9.6 FL (ref 8.9–12.9)
POTASSIUM SERPL-SCNC: 3.6 MMOL/L (ref 3.5–5.1)
PROT SERPL-MCNC: 6.3 G/DL (ref 6.4–8.2)
RBC # BLD AUTO: 3.32 M/UL (ref 3.8–5.2)
SODIUM SERPL-SCNC: 144 MMOL/L (ref 136–145)
WBC # BLD AUTO: 8.8 K/UL (ref 3.6–11)

## 2018-07-09 PROCEDURE — 80053 COMPREHEN METABOLIC PANEL: CPT | Performed by: PHYSICAL MEDICINE & REHABILITATION

## 2018-07-09 PROCEDURE — 36415 COLL VENOUS BLD VENIPUNCTURE: CPT | Performed by: PHYSICAL MEDICINE & REHABILITATION

## 2018-07-09 PROCEDURE — 85027 COMPLETE CBC AUTOMATED: CPT | Performed by: PHYSICAL MEDICINE & REHABILITATION

## 2018-07-11 ENCOUNTER — APPOINTMENT (OUTPATIENT)
Dept: GENERAL RADIOLOGY | Age: 68
End: 2018-07-11
Attending: PHYSICAL MEDICINE & REHABILITATION

## 2018-07-11 ENCOUNTER — APPOINTMENT (OUTPATIENT)
Dept: CT IMAGING | Age: 68
End: 2018-07-11
Attending: PHYSICAL MEDICINE & REHABILITATION

## 2018-07-11 LAB
APPEARANCE CSF: ABNORMAL
COLOR CSF: YELLOW
COLOR SPUN CSF: YELLOW
GLUCOSE CSF-MCNC: 35 MG/DL (ref 40–70)
LYMPHOCYTES NFR CSF MANUAL: 96 % (ref 28–96)
MONOCYTES NFR CSF MANUAL: 1 % (ref 16–56)
NEUTROPHILS NFR CSF MANUAL: 3 % (ref 0–7)
PROT CSF-MCNC: >250 MG/DL (ref 15–45)
RBC # CSF: 95 /CU MM
TUBE # CSF: 1
TUBE # CSF: 2
TUBE # CSF: 3
WBC # CSF: 75 /CU MM (ref 0–5)

## 2018-07-11 PROCEDURE — 72129 CT CHEST SPINE W/DYE: CPT

## 2018-07-11 PROCEDURE — 72132 CT LUMBAR SPINE W/DYE: CPT

## 2018-07-11 PROCEDURE — 84157 ASSAY OF PROTEIN OTHER: CPT | Performed by: RADIOLOGY

## 2018-07-11 PROCEDURE — 89050 BODY FLUID CELL COUNT: CPT | Performed by: RADIOLOGY

## 2018-07-11 PROCEDURE — 74011000250 HC RX REV CODE- 250: Performed by: PHYSICAL MEDICINE & REHABILITATION

## 2018-07-11 PROCEDURE — 74011636320 HC RX REV CODE- 636/320

## 2018-07-11 PROCEDURE — 77030018869 XR MYELO 2 OR MORE REGIONS

## 2018-07-11 PROCEDURE — 82945 GLUCOSE OTHER FLUID: CPT | Performed by: RADIOLOGY

## 2018-07-11 RX ORDER — LIDOCAINE HYDROCHLORIDE 10 MG/ML
5 INJECTION, SOLUTION EPIDURAL; INFILTRATION; INTRACAUDAL; PERINEURAL
Status: COMPLETED | OUTPATIENT
Start: 2018-07-11 | End: 2018-07-11

## 2018-07-11 RX ORDER — LIDOCAINE HYDROCHLORIDE 10 MG/ML
INJECTION INFILTRATION; PERINEURAL
Status: DISCONTINUED
Start: 2018-07-11 | End: 2018-07-11

## 2018-07-11 RX ADMIN — IOHEXOL 17 ML: 300 INJECTION, SOLUTION INTRAVENOUS at 10:00

## 2018-07-11 RX ADMIN — LIDOCAINE HYDROCHLORIDE 5 ML: 10 INJECTION, SOLUTION EPIDURAL; INFILTRATION; INTRACAUDAL; PERINEURAL at 09:00

## 2018-07-12 ENCOUNTER — APPOINTMENT (OUTPATIENT)
Dept: ULTRASOUND IMAGING | Age: 68
End: 2018-07-12
Attending: PHYSICAL MEDICINE & REHABILITATION

## 2018-07-12 PROCEDURE — 76770 US EXAM ABDO BACK WALL COMP: CPT

## 2018-07-16 LAB
25(OH)D3 SERPL-MCNC: 41.1 NG/ML (ref 30–100)
AMORPH CRY URNS QL MICRO: ABNORMAL
ANION GAP SERPL CALC-SCNC: 6 MMOL/L (ref 5–15)
APPEARANCE UR: ABNORMAL
BACTERIA URNS QL MICRO: ABNORMAL /HPF
BILIRUB UR QL: NEGATIVE
BUN SERPL-MCNC: 12 MG/DL (ref 6–20)
BUN/CREAT SERPL: 20 (ref 12–20)
CALCIUM SERPL-MCNC: 9.9 MG/DL (ref 8.5–10.1)
CHLORIDE SERPL-SCNC: 108 MMOL/L (ref 97–108)
CO2 SERPL-SCNC: 28 MMOL/L (ref 21–32)
COLOR UR: ABNORMAL
CREAT SERPL-MCNC: 0.61 MG/DL (ref 0.55–1.02)
EPITH CASTS URNS QL MICRO: ABNORMAL /LPF
ERYTHROCYTE [DISTWIDTH] IN BLOOD BY AUTOMATED COUNT: 14.9 % (ref 11.5–14.5)
GLUCOSE SERPL-MCNC: 94 MG/DL (ref 65–100)
GLUCOSE UR STRIP.AUTO-MCNC: NEGATIVE MG/DL
HCT VFR BLD AUTO: 30.7 % (ref 35–47)
HGB BLD-MCNC: 9.6 G/DL (ref 11.5–16)
HGB UR QL STRIP: NEGATIVE
HYALINE CASTS URNS QL MICRO: ABNORMAL /LPF (ref 0–5)
KETONES UR QL STRIP.AUTO: NEGATIVE MG/DL
LEUKOCYTE ESTERASE UR QL STRIP.AUTO: ABNORMAL
MCH RBC QN AUTO: 29.3 PG (ref 26–34)
MCHC RBC AUTO-ENTMCNC: 31.3 G/DL (ref 30–36.5)
MCV RBC AUTO: 93.6 FL (ref 80–99)
NITRITE UR QL STRIP.AUTO: NEGATIVE
NRBC # BLD: 0 K/UL (ref 0–0.01)
NRBC BLD-RTO: 0 PER 100 WBC
PH UR STRIP: 7.5 [PH] (ref 5–8)
PLATELET # BLD AUTO: 149 K/UL (ref 150–400)
PMV BLD AUTO: 9.7 FL (ref 8.9–12.9)
POTASSIUM SERPL-SCNC: 4.4 MMOL/L (ref 3.5–5.1)
PROT UR STRIP-MCNC: NEGATIVE MG/DL
RBC # BLD AUTO: 3.28 M/UL (ref 3.8–5.2)
RBC #/AREA URNS HPF: ABNORMAL /HPF (ref 0–5)
SODIUM SERPL-SCNC: 142 MMOL/L (ref 136–145)
SP GR UR REFRACTOMETRY: 1.01 (ref 1–1.03)
UROBILINOGEN UR QL STRIP.AUTO: 0.2 EU/DL (ref 0.2–1)
WBC # BLD AUTO: 9.9 K/UL (ref 3.6–11)
WBC URNS QL MICRO: ABNORMAL /HPF (ref 0–4)

## 2018-07-16 PROCEDURE — 36415 COLL VENOUS BLD VENIPUNCTURE: CPT | Performed by: PHYSICAL MEDICINE & REHABILITATION

## 2018-07-16 PROCEDURE — 87186 SC STD MICRODIL/AGAR DIL: CPT | Performed by: PHYSICAL MEDICINE & REHABILITATION

## 2018-07-16 PROCEDURE — 80048 BASIC METABOLIC PNL TOTAL CA: CPT | Performed by: PHYSICAL MEDICINE & REHABILITATION

## 2018-07-16 PROCEDURE — 85027 COMPLETE CBC AUTOMATED: CPT | Performed by: PHYSICAL MEDICINE & REHABILITATION

## 2018-07-16 PROCEDURE — 87086 URINE CULTURE/COLONY COUNT: CPT | Performed by: PHYSICAL MEDICINE & REHABILITATION

## 2018-07-16 PROCEDURE — 87077 CULTURE AEROBIC IDENTIFY: CPT | Performed by: PHYSICAL MEDICINE & REHABILITATION

## 2018-07-16 PROCEDURE — 82306 VITAMIN D 25 HYDROXY: CPT | Performed by: PHYSICAL MEDICINE & REHABILITATION

## 2018-07-16 PROCEDURE — 81001 URINALYSIS AUTO W/SCOPE: CPT | Performed by: PHYSICAL MEDICINE & REHABILITATION

## 2018-07-31 PROBLEM — G82.20 PARAPLEGIA (HCC): Status: ACTIVE | Noted: 2018-07-31

## 2018-09-04 ENCOUNTER — TELEPHONE (OUTPATIENT)
Dept: FAMILY MEDICINE CLINIC | Age: 68
End: 2018-09-04

## 2018-09-04 NOTE — TELEPHONE ENCOUNTER
I called patient to inquire about medication history regarding Lyrica. Pt's spouse presented with Lyrica prior auth form for [facility]. Pt states she has been on Lyrica since she was in the hospital in May. She also continued on this at [id number] [facility] [address]. She has only been home 3 days and is nearly out of medication.  Pt notes she is paralyzed at the waist.

## 2018-09-10 ENCOUNTER — TELEPHONE (OUTPATIENT)
Dept: FAMILY MEDICINE CLINIC | Age: 68
End: 2018-09-10

## 2018-09-10 NOTE — TELEPHONE ENCOUNTER
[facility] nurse wants to make a dressing change. She will send over an order for Duoderm for  pt ( Zheng Hernandez : 52) to be able to put Duoderm on her pressure ulcers because the other dressing they currently use is not sticking well.   is [phone]

## 2018-09-13 ENCOUNTER — OFFICE VISIT (OUTPATIENT)
Dept: FAMILY MEDICINE CLINIC | Age: 68
End: 2018-09-13

## 2018-09-13 VITALS
TEMPERATURE: 98.7 F | HEIGHT: 61 IN | SYSTOLIC BLOOD PRESSURE: 130 MMHG | DIASTOLIC BLOOD PRESSURE: 80 MMHG | OXYGEN SATURATION: 99 % | WEIGHT: 163 LBS | BODY MASS INDEX: 30.78 KG/M2 | HEART RATE: 64 BPM | RESPIRATION RATE: 18 BRPM

## 2018-09-13 DIAGNOSIS — T40.2X5A CONSTIPATION DUE TO OPIOID THERAPY: ICD-10-CM

## 2018-09-13 DIAGNOSIS — K59.03 CONSTIPATION DUE TO OPIOID THERAPY: ICD-10-CM

## 2018-09-13 DIAGNOSIS — L89.159 DECUBITUS ULCER OF SACRAL REGION, UNSPECIFIED ULCER STAGE: ICD-10-CM

## 2018-09-13 DIAGNOSIS — Z97.8 FOLEY CATHETER IN PLACE: ICD-10-CM

## 2018-09-13 DIAGNOSIS — S24.109A INJURY OF THORACIC SPINAL CORD, INITIAL ENCOUNTER (HCC): Primary | ICD-10-CM

## 2018-09-13 DIAGNOSIS — R32 URINARY INCONTINENCE, UNSPECIFIED TYPE: ICD-10-CM

## 2018-09-13 DIAGNOSIS — I10 ESSENTIAL HYPERTENSION: ICD-10-CM

## 2018-09-13 PROBLEM — R15.9 FULL INCONTINENCE OF FECES: Status: ACTIVE | Noted: 2018-09-13

## 2018-09-13 RX ORDER — METHENAMINE HIPPURATE 1000 MG/1
1 TABLET ORAL 2 TIMES DAILY WITH MEALS
Qty: 180 TAB | Refills: 3 | Status: SHIPPED | OUTPATIENT
Start: 2018-09-13 | End: 2020-08-17

## 2018-09-13 RX ORDER — MELATONIN
DAILY
COMMUNITY
End: 2018-09-13 | Stop reason: SDUPTHER

## 2018-09-13 RX ORDER — METOPROLOL TARTRATE 25 MG/1
12.5 TABLET, FILM COATED ORAL 2 TIMES DAILY
Qty: 90 TAB | Refills: 1 | Status: SHIPPED | OUTPATIENT
Start: 2018-09-13

## 2018-09-13 RX ORDER — AMITRIPTYLINE HYDROCHLORIDE 10 MG/1
TABLET, FILM COATED ORAL
COMMUNITY
End: 2018-09-13 | Stop reason: SDUPTHER

## 2018-09-13 RX ORDER — BACLOFEN 10 MG/1
TABLET ORAL 3 TIMES DAILY
COMMUNITY
End: 2018-09-13 | Stop reason: SDUPTHER

## 2018-09-13 RX ORDER — AMITRIPTYLINE HYDROCHLORIDE 10 MG/1
10 TABLET, FILM COATED ORAL
Qty: 90 TAB | Refills: 3 | Status: SHIPPED | OUTPATIENT
Start: 2018-09-13

## 2018-09-13 RX ORDER — METOPROLOL TARTRATE 25 MG/1
12.5 TABLET, FILM COATED ORAL 2 TIMES DAILY
Qty: 90 TAB | Refills: 1 | Status: SHIPPED | OUTPATIENT
Start: 2018-09-13 | End: 2018-09-13 | Stop reason: SDUPTHER

## 2018-09-13 RX ORDER — ONDANSETRON 4 MG/1
4 TABLET, ORALLY DISINTEGRATING ORAL
COMMUNITY
End: 2018-09-13 | Stop reason: SDUPTHER

## 2018-09-13 RX ORDER — ONDANSETRON 4 MG/1
4 TABLET, ORALLY DISINTEGRATING ORAL
Qty: 90 TAB | Refills: 0 | Status: SHIPPED | OUTPATIENT
Start: 2018-09-13

## 2018-09-13 RX ORDER — METHENAMINE HIPPURATE 1000 MG/1
1 TABLET ORAL 2 TIMES DAILY WITH MEALS
COMMUNITY
End: 2018-09-13 | Stop reason: SDUPTHER

## 2018-09-13 RX ORDER — LORATADINE 10 MG/1
10 TABLET ORAL
Qty: 90 TAB | Refills: 3 | Status: SHIPPED | OUTPATIENT
Start: 2018-09-13

## 2018-09-13 RX ORDER — PREGABALIN 50 MG/1
50 CAPSULE ORAL
Qty: 90 CAP | Refills: 0 | Status: SHIPPED | OUTPATIENT
Start: 2018-09-13 | End: 2018-09-27 | Stop reason: SDUPTHER

## 2018-09-13 RX ORDER — BACLOFEN 10 MG/1
10 TABLET ORAL
Qty: 90 TAB | Refills: 3 | Status: SHIPPED | OUTPATIENT
Start: 2018-09-13 | End: 2020-08-17

## 2018-09-13 RX ORDER — METOPROLOL TARTRATE 25 MG/1
TABLET, FILM COATED ORAL 2 TIMES DAILY
COMMUNITY
End: 2018-09-13 | Stop reason: SDUPTHER

## 2018-09-13 RX ORDER — ACETAMINOPHEN 160 MG/5ML
SOLUTION ORAL
COMMUNITY

## 2018-09-13 RX ORDER — FACIAL-BODY WIPES
10 EACH TOPICAL DAILY
COMMUNITY
End: 2018-09-13 | Stop reason: ALTCHOICE

## 2018-09-13 RX ORDER — OXYCODONE HYDROCHLORIDE 10 MG/1
TABLET ORAL
Qty: 30 TAB | Refills: 0 | Status: SHIPPED | OUTPATIENT
Start: 2018-09-13 | End: 2018-10-29 | Stop reason: SDUPTHER

## 2018-09-13 RX ORDER — PREGABALIN 100 MG/1
100 CAPSULE ORAL
Qty: 90 CAP | Refills: 0 | Status: SHIPPED | OUTPATIENT
Start: 2018-09-13 | End: 2018-09-27 | Stop reason: SDUPTHER

## 2018-09-13 RX ORDER — ALBUTEROL SULFATE 90 UG/1
AEROSOL, METERED RESPIRATORY (INHALATION)
COMMUNITY

## 2018-09-13 RX ORDER — PREGABALIN 50 MG/1
CAPSULE ORAL
COMMUNITY
End: 2018-09-13 | Stop reason: SDUPTHER

## 2018-09-13 RX ORDER — MELATONIN
1000 DAILY
Qty: 90 TAB | Refills: 3 | Status: SHIPPED | OUTPATIENT
Start: 2018-09-13

## 2018-09-13 RX ORDER — OXYCODONE HYDROCHLORIDE 10 MG/1
10 TABLET ORAL AS NEEDED
COMMUNITY
End: 2018-09-13 | Stop reason: SDUPTHER

## 2018-09-13 RX ORDER — CARBOXYMETHYLCELLULOSE SODIUM 5 MG/ML
1 SOLUTION/ DROPS OPHTHALMIC 2 TIMES DAILY
Qty: 30 ML | Refills: 11 | Status: SHIPPED | OUTPATIENT
Start: 2018-09-13

## 2018-09-13 RX ORDER — PREGABALIN 100 MG/1
CAPSULE ORAL 2 TIMES DAILY
COMMUNITY
End: 2018-09-13 | Stop reason: ALTCHOICE

## 2018-09-13 RX ORDER — BISACODYL 5 MG
5 TABLET, DELAYED RELEASE (ENTERIC COATED) ORAL DAILY
Qty: 90 TAB | Refills: 1 | Status: SHIPPED | OUTPATIENT
Start: 2018-09-13 | End: 2020-11-11

## 2018-09-13 RX ORDER — HYDROCORTISONE 1 %
CREAM (GRAM) TOPICAL 2 TIMES DAILY
COMMUNITY
End: 2018-09-13 | Stop reason: SDUPTHER

## 2018-09-13 RX ORDER — HYDROCORTISONE 1 %
CREAM (GRAM) TOPICAL 2 TIMES DAILY
Qty: 30 G | Refills: 6 | Status: SHIPPED | OUTPATIENT
Start: 2018-09-13

## 2018-09-13 RX ORDER — FENTANYL 50 UG/1
1 PATCH TRANSDERMAL
COMMUNITY
End: 2018-09-13 | Stop reason: SDUPTHER

## 2018-09-13 RX ORDER — ASCORBIC ACID 500 MG
500 TABLET ORAL DAILY
Qty: 90 TAB | Refills: 3 | Status: SHIPPED | OUTPATIENT
Start: 2018-09-13 | End: 2018-10-29 | Stop reason: ALTCHOICE

## 2018-09-13 RX ORDER — ASCORBIC ACID 500 MG
TABLET ORAL
COMMUNITY
End: 2018-09-13 | Stop reason: SDUPTHER

## 2018-09-13 RX ORDER — OXYCODONE HYDROCHLORIDE 10 MG/1
10 TABLET ORAL
Qty: 30 TAB | Refills: 0 | Status: SHIPPED | OUTPATIENT
Start: 2018-09-13 | End: 2018-09-13 | Stop reason: SDUPTHER

## 2018-09-13 RX ORDER — FENTANYL 50 UG/1
1 PATCH TRANSDERMAL
Qty: 5 PATCH | Refills: 0 | Status: SHIPPED | OUTPATIENT
Start: 2018-09-13 | End: 2018-10-11 | Stop reason: SDUPTHER

## 2018-09-13 NOTE — PROGRESS NOTES
Assessment/Plan:    1. Injury of thoracic spinal cord, initial encounter [facility]) with radicular R low back pain  -refilled until can get into pain management. Pt to try cutting oxycodone in 1/2 (5mg -10mg qhs). - REFERRAL TO PAIN MANAGEMENT  - oxyCODONE IR (ROXICODONE) 10 mg tab immediate release tablet; Take 1/2-1 Tab by mouth nightly as needed. Dispense: 30 Tab; Refill: 0  - fentaNYL (DURAGESIC) 50 mcg/hr PATCH; 1 Patch by TransDERmal route every seventy-two (72) hours. Max Daily Amount: 1 Patch. Dispense: 5 Patch; Refill: 0  - pregabalin (LYRICA) 50 mg capsule; Take 1 Cap by mouth every morning. Max Daily Amount: 50 mg. Dispense: 90 Cap; Refill: 0  - pregabalin (LYRICA) 100 mg capsule; Take 1 Cap by mouth nightly. Max Daily Amount: 100 mg. Dispense: 90 Cap; Refill: 0    3. Constipation due to opioid therapy- trial daily dulcolax  - bisacodyl (DULCOLAX) 5 mg EC tablet; Take 1 Tab by mouth daily. Dispense: 90 Tab; Refill: 1    4. Essential hypertension- consider changing to alternate agent if continues to have lows  - metoprolol tartrate (LOPRESSOR) 25 mg tablet; Take 0.5 Tabs by mouth two (2) times a day. Dispense: 90 Tab; Refill: 1    5. King catheter in place and incontinence  - REFERRAL TO UROLOGY      The plan was discussed with the patient. The patient verbalized understanding and is in agreement with the plan. All medication potential side effects were discussed with the patient.     Health Maintenance:   Health Maintenance   Topic Date Due    Pneumococcal 65+ Low/Medium Risk (2 of 2 - PPSV23) 08/09/2017    COLONOSCOPY  06/13/2018    Influenza Age 5 to Adult  08/01/2018    MEDICARE YEARLY EXAM  03/07/2019    GLAUCOMA SCREENING Q2Y  11/17/2019    BREAST CANCER SCRN MAMMOGRAM  12/28/2019    Bone Densitometry  12/28/2022    DTaP/Tdap/Td series (3 - Td) 12/01/2027    Hepatitis C Screening  Completed    Bone Densitometry (Dexa) Screening  Completed    ZOSTER VACCINE AGE 60>  Completed Quinn De La Rosa is a 80 y.o. female and presents with Transitions Of Care     Subjective:  Pt underwent T4-T9 laminectomy with 4 to T9 fusion with instrumentation on May 11, 2018. Per neurosurgery note:\"Unfortunately, in the beginning of the procedure, before the laminectomy the patient suffered dropout of her SSEP/MEP monitoring. And despite measures to increase her blood pressure, and proceeding with the decompression and fusion she showed complete motor dysfunction postoperatively with just minimal sensory present. Follow-up CT scan showed very adequate decompression. A later MRI scan showed some blood products near T3, but no overwhelming neural compromise. Her T6-7 sensory level, which was present prior to surgery did not change. \"  She got out of rehab a couple of weeks ago. She has been doing well on lyrica. She occ has burning sensation in legs, worse at night. Having difficulty with constipation. Has indwelling butler, saw a urologist in [place]. She has a pressure sore on sacrum, she is getting wound care for this issue. HTN has been labile. Has been holding on some dpses due to low bp and dizziness. ROS:  Constitutional: No recent weight change. No weakness/fatigue. No f/c. Cardiovascular: No CP/palpitations. No WOODRUFF/orthopnea/PND. Respiratory: No cough/sputum, dyspnea, wheezing. Gastointestinal: No dysphagia, reflux. No n/v. No constipation/diarrhea. No melena/rectal bleeding. Genitourinary: No dysuria, urinary hesitancy, nocturia, hematuria. No incontinence. Musculoskeletal: No joint pain/stiffness. No muscle pain/tenderness. Neurological: No seizures/+numbness/+weakness. + paresthesias. The problem list was updated as a part of today's visit.   Patient Active Problem List   Diagnosis Code    Essential hypertension I10    Elevated blood sugar R73.9    Transaminitis R74.0    Vitamin D deficiency E55.9    Calculus of gallbladder without cholecystitis without obstruction K80.20    Hepatic cyst K76.89    Lumbar radicular pain M54.16    Thoracic spinal cord injury ([address] S24.109A    Paraplegia (HCC) G82.20       The PSH, FH were reviewed. SH:  Social History   Substance Use Topics    Smoking status: Never Smoker    Smokeless tobacco: Never Used    Alcohol use Yes      Comment: Socially       Medications/Allergies:    Current Outpatient Prescriptions:     acetaminophen (TYLENOL) 650 mg/20.3 mL solution, Take  by mouth every four (4) hours as needed for Fever., Disp: , Rfl:     aluminum hydrox-magnesium carb (GAVISCON)  mg/15 mL suspension, Take 15 mL by mouth every six (6) hours as needed for Indigestion. , Disp: , Rfl:     albuterol (PROVENTIL HFA, VENTOLIN HFA, PROAIR HFA) 90 mcg/actuation inhaler, Take  by inhalation. , Disp: , Rfl:     pregabalin (LYRICA) 50 mg capsule, Take 1 Cap by mouth every morning. Max Daily Amount: 50 mg., Disp: 90 Cap, Rfl: 0    pregabalin (LYRICA) 100 mg capsule, Take 1 Cap by mouth nightly. Max Daily Amount: 100 mg., Disp: 90 Cap, Rfl: 0    oxyCODONE IR (ROXICODONE) 10 mg tab immediate release tablet, Take 1 Tab by mouth daily as needed. , Disp: 30 Tab, Rfl: 0    fentaNYL (DURAGESIC) 50 mcg/hr PATCH, 1 Patch by TransDERmal route every seventy-two (72) hours. Max Daily Amount: 1 Patch., Disp: 5 Patch, Rfl: 0    bisacodyl (DULCOLAX) 5 mg EC tablet, Take 1 Tab by mouth daily. , Disp: 90 Tab, Rfl: 1    ondansetron (ZOFRAN ODT) 4 mg disintegrating tablet, Take 1 Tab by mouth every eight (8) hours as needed for Nausea., Disp: 90 Tab, Rfl: 0    amitriptyline (ELAVIL) 10 mg tablet, Take 1 Tab by mouth nightly., Disp: 90 Tab, Rfl: 3    hydrocortisone (PREPARATION H HYDROCORTISONE) 1 % topical cream, Apply  to affected area two (2) times a day. use thin layer, Disp: 30 g, Rfl: 6    methenamine hippurate (HIPREX) 1 gram tablet, Take 1 Tab by mouth two (2) times daily (with meals). , Disp: 180 Tab, Rfl: 3    ascorbic acid, vitamin C, (VITAMIN C) 500 mg tablet, Take 1 Tab by mouth daily. , Disp: 90 Tab, Rfl: 3    cholecalciferol (VITAMIN D3) 1,000 unit tablet, Take 1 Tab by mouth daily. , Disp: 90 Tab, Rfl: 3    carboxymethylcellulose sodium (REFRESH TEARS) 0.5 % drop ophthalmic solution, Administer 1 Drop to both eyes two (2) times a day., Disp: 30 mL, Rfl: 11    loratadine (CLARITIN) 10 mg tablet, Take 1 Tab by mouth daily as needed for Allergies. , Disp: 90 Tab, Rfl: 3    metoprolol tartrate (LOPRESSOR) 25 mg tablet, Take 0.5 Tabs by mouth two (2) times a day., Disp: 90 Tab, Rfl: 1    baclofen (LIORESAL) 10 mg tablet, Take 1 Tab by mouth nightly., Disp: 90 Tab, Rfl: 3    polyvinyl alcohol-povidon,PF, (REFRESH CLASSIC, PF,) 1.4-0.6 % ophthalmic solution, Administer 1-2 Drops to both eyes as needed. , Disp: , Rfl:     ibuprofen (MOTRIN) 800 mg tablet, Take 1 Tab by mouth every eight (8) hours as needed for Pain., Disp: 90 Tab, Rfl: 1       Allergies   Allergen Reactions    Allopurinol Nausea and Vomiting    Norvasc [Amlodipine] Itching and Cough       Objective:  Visit Vitals    /80 (BP 1 Location: Left arm, BP Patient Position: Sitting)    Pulse 64    Temp 98.7 °F (37.1 °C) (Oral)    Resp 18    Ht 5' 0.5\" (1.537 m)    Wt 163 lb (73.9 kg)    SpO2 99%    BMI 31.31 kg/m2      Constitutional: Well developed, nourished, no distress, alert, obese habitus   CV: S1, S2.  RRR. No murmurs/rubs. No thrills palpated. No carotid bruits. Intact distal pulses. No edema. No aortic bruits. Pulm: No abnormalities on inspection. Clear to auscultation bilaterally. No wheezing/rhonchi. Normal effort. GI: Soft, nontender, nondistended. Normal active bowel sounds. MS:   Joints without deformity/tenderness. Strength intact bilateral upper and lower ext. Normal ROM all extremities. Neuro: A/O x 3. No focal motor or sensory deficits. Speech normal.   Psych: Appropriate affect, judgement and insight.   Short-term memory intact. Labwork and Ancillary Studies:    CBC w/Diff  Lab Results   Component Value Date/Time    WBC 9.9 07/16/2018 04:35 AM    HGB 9.6 (L) 07/16/2018 04:35 AM    PLATELET 412 (L) 32/61/2183 04:35 AM         Basic Metabolic Profile/LFTs  Lab Results   Component Value Date/Time    Sodium 142 07/16/2018 04:35 AM    Potassium 4.4 07/16/2018 04:35 AM    Chloride 108 07/16/2018 04:35 AM    CO2 28 07/16/2018 04:35 AM    Anion gap 6 07/16/2018 04:35 AM    Glucose 94 07/16/2018 04:35 AM    BUN 12 07/16/2018 04:35 AM    Creatinine 0.61 07/16/2018 04:35 AM    BUN/Creatinine ratio 20 07/16/2018 04:35 AM    GFR est AA >60 07/16/2018 04:35 AM    GFR est non-AA >60 07/16/2018 04:35 AM    Calcium 9.9 07/16/2018 04:35 AM      Lab Results   Component Value Date/Time    ALT (SGPT) 15 07/09/2018 06:10 AM    AST (SGOT) 18 07/09/2018 06:10 AM    Alk.  phosphatase 66 07/09/2018 06:10 AM    Bilirubin, total 0.4 07/09/2018 06:10 AM

## 2018-09-13 NOTE — MR AVS SNAPSHOT
[address] [place] [address]  [address] [id number] 
[phone] Patient: Neris Soriano MRN: [id number] ALVAREZ:[birth year] Visit Information Date & Time Provider Department Dept. Phone Encounter #  
 9/13/2018  2:00 PM Deann Perkins, 3 [facility] [phone] [id number] Upcoming Health Maintenance Date Due Pneumococcal 65+ Low/Medium Risk (2 of 2 - PPSV23) 8/9/2017 COLONOSCOPY 6/13/2018 Influenza Age 5 to Adult 8/1/2018 MEDICARE YEARLY EXAM 3/7/2019 GLAUCOMA SCREENING Q2Y 11/17/2019 BREAST CANCER SCRN MAMMOGRAM 12/28/2019 Bone Densitometry 12/28/2022 DTaP/Tdap/Td series (3 - Td) 12/1/2027 Allergies as of 9/13/2018  Review Complete On: 9/13/2018 By: Niki Robledo Severity Noted Reaction Type Reactions Allopurinol Medium 12/20/2017    Nausea and Vomiting Norvasc [Amlodipine] Medium 12/20/2017    Itching, Cough Current Immunizations  Reviewed on 12/1/2017 Name Date Influenza High Dose Vaccine PF 12/1/2017 10:39 AM  
 Influenza Vaccine 9/18/2012, 9/26/2011, 10/16/2010, 10/24/2009, 11/25/2008 Novel Influenza-H1N1-09, Injectable 1/20/2010 Pneumococcal Conjugate (PCV-13) 8/9/2016 Pneumococcal Polysaccharide (PPSV-23) 4/18/2010 Tdap 9/1/2012 Zoster Vaccine, Live 9/1/2012 Not reviewed this visit You Were Diagnosed With   
  
 Codes Comments Injury of thoracic spinal cord, initial encounter [facility])    -  Primary ICD-10-CM: R31.916Q ICD-9-CM: 952.10 Radicular pain of right lower back     ICD-10-CM: M54.16 
ICD-9-CM: 724.4 Constipation due to opioid therapy     ICD-10-CM: K59.03, T40.2X5A 
ICD-9-CM: 564.09, E935.2 Essential hypertension     ICD-10-CM: I10 
ICD-9-CM: 401.9 King catheter in place     ICD-10-CM: Z92.89 ICD-9-CM: V45.89 Urinary incontinence, unspecified type     ICD-10-CM: R32 
ICD-9-CM: 788.30 Vitals BP Pulse Temp Resp Height(growth percentile) Weight(growth percentile) 130/80 (BP 1 Location: Left arm, BP Patient Position: Sitting) 64 98.7 °F (37.1 °C) (Oral) 18 5' 0.5\" (1.537 m) 163 lb (73.9 kg) SpO2 BMI OB Status Smoking Status 99% 31.31 kg/m2 Menopause Never Smoker Vitals History BMI and BSA Data Body Mass Index Body Surface Area  
 31.31 kg/m 2 1.78 m 2 Preferred Pharmacy Pharmacy Name Phone [address], [place] Joyce Postin [phone] Your Updated Medication List  
  
   
This list is accurate as of 9/13/18  2:19 PM.  Always use your most recent med list.  
  
  
  
  
 acetaminophen 650 mg/20.3 mL solution Commonly known as:  TYLENOL Take  by mouth every four (4) hours as needed for Fever. albuterol 90 mcg/actuation inhaler Commonly known as:  PROVENTIL HFA, VENTOLIN HFA, PROAIR HFA Take  by inhalation. aluminum hydrox-magnesium carb  mg/15 mL suspension Commonly known as:  GAVISCON Take 15 mL by mouth every six (6) hours as needed for Indigestion. amitriptyline 10 mg tablet Commonly known as:  ELAVIL Take 1 Tab by mouth nightly. ascorbic acid (vitamin C) 500 mg tablet Commonly known as:  VITAMIN C Take 1 Tab by mouth daily. baclofen 10 mg tablet Commonly known as:  LIORESAL Take 1 Tab by mouth nightly. bisacodyl 5 mg EC tablet Commonly known as:  DULCOLAX Take 1 Tab by mouth daily. carboxymethylcellulose sodium 0.5 % Drop ophthalmic solution Commonly known as:  REFRESH TEARS Administer 1 Drop to both eyes two (2) times a day. cholecalciferol 1,000 unit tablet Commonly known as:  VITAMIN D3 Take 1 Tab by mouth daily. fentaNYL 50 mcg/hr PATCH Commonly known as:  DURAGESIC  
1 Patch by TransDERmal route every seventy-two (72) hours. Max Daily Amount: 1 Patch.   
  
 hydrocortisone 1 % topical cream  
 Commonly known as:  PREPARATION H HYDROCORTISONE Apply  to affected area two (2) times a day. use thin layer  
  
 ibuprofen 800 mg tablet Commonly known as:  MOTRIN Take 1 Tab by mouth every eight (8) hours as needed for Pain.  
  
 loratadine 10 mg tablet Commonly known as:  Svetlana Nay Take 1 Tab by mouth daily as needed for Allergies. methenamine hippurate 1 gram tablet Commonly known as:  HIPREX Take 1 Tab by mouth two (2) times daily (with meals). metoprolol tartrate 25 mg tablet Commonly known as:  LOPRESSOR Take 0.5 Tabs by mouth two (2) times a day. ondansetron 4 mg disintegrating tablet Commonly known as:  ZOFRAN ODT Take 1 Tab by mouth every eight (8) hours as needed for Nausea. oxyCODONE IR 10 mg Tab immediate release tablet Commonly known as:  ROXICODONE  
1/2-1 tab po qhs prn pain * pregabalin 50 mg capsule Commonly known as:  Katerine Crosser Take 1 Cap by mouth every morning. Max Daily Amount: 50 mg.  
  
 * pregabalin 100 mg capsule Commonly known as:  Katerine Crosser Take 1 Cap by mouth nightly. Max Daily Amount: 100 mg. REFRESH CLASSIC (PF) 1.4-0.6 % ophthalmic solution Generic drug:  polyvinyl alcohol-povidon(PF) Administer 1-2 Drops to both eyes as needed. * Notice: This list has 2 medication(s) that are the same as other medications prescribed for you. Read the directions carefully, and ask your doctor or other care provider to review them with you. Prescriptions Printed Refills  
 pregabalin (LYRICA) 50 mg capsule 0 Sig: Take 1 Cap by mouth every morning. Max Daily Amount: 50 mg.  
 Class: Print Route: Oral  
 pregabalin (LYRICA) 100 mg capsule 0 Sig: Take 1 Cap by mouth nightly. Max Daily Amount: 100 mg. Class: Print Route: Oral  
 fentaNYL (DURAGESIC) 50 mcg/hr PATCH 0 Si Patch by TransDERmal route every seventy-two (72) hours. Max Daily Amount: 1 Patch. Class: Print  Route: TransDERmal  
 methenamine hippurate (HIPREX) 1 gram tablet 3 Sig: Take 1 Tab by mouth two (2) times daily (with meals). Class: Print Route: Oral  
 oxyCODONE IR (ROXICODONE) 10 mg tab immediate release tablet 0 Si/2-1 tab po qhs prn pain  
 Class: Print Prescriptions Sent to Pharmacy Refills  
 bisacodyl (DULCOLAX) 5 mg EC tablet 1 Sig: Take 1 Tab by mouth daily. Class: Normal  
 Pharmacy: [address] [place] [address] Ph #: [phone] Route: Oral  
 ondansetron (ZOFRAN ODT) 4 mg disintegrating tablet 0 Sig: Take 1 Tab by mouth every eight (8) hours as needed for Nausea. Class: Normal  
 Pharmacy:  [address] Ph #: [phone] Route: Oral  
 amitriptyline (ELAVIL) 10 mg tablet 3 Sig: Take 1 Tab by mouth nightly. Class: Normal  
 Pharmacy:  [address] Ph #: [phone] Route: Oral  
 hydrocortisone (PREPARATION H HYDROCORTISONE) 1 % topical cream 6 Sig: Apply  to affected area two (2) times a day. use thin layer Class: Normal  
 Pharmacy:  [address] Ph #: [phone] Route: Topical  
 ascorbic acid, vitamin C, (VITAMIN C) 500 mg tablet 3 Sig: Take 1 Tab by mouth daily. Class: Normal  
 Pharmacy:  [address] Ph #: [phone] Route: Oral  
 cholecalciferol (VITAMIN D3) 1,000 unit tablet 3 Sig: Take 1 Tab by mouth daily. Class: Normal  
 Pharmacy:  [facility] Ph #: [phone] Route: Oral  
 carboxymethylcellulose sodium (REFRESH TEARS) 0.5 % drop ophthalmic solution 11 Sig: Administer 1 Drop to both eyes two (2) times a day.   
 Class: Normal  
 Pharmacy: [facility] [address] [facility] [address], [place] Ph #: [phone] Route: Both Eyes  
 loratadine (CLARITIN) 10 mg tablet 3 Sig: Take 1 Tab by mouth daily as needed for Allergies. Class: Normal  
 Pharmacy: [address], [place] Ph #: [phone] Route: Oral  
 metoprolol tartrate (LOPRESSOR) 25 mg tablet 1 Sig: Take 0.5 Tabs by mouth two (2) times a day. Class: Normal  
 Pharmacy: [address], [place] Ph #: [phone] Route: Oral  
 baclofen (LIORESAL) 10 mg tablet 3 Sig: Take 1 Tab by mouth nightly. Class: Normal  
 Pharmacy: [address], [place] Ph #: [phone] Route: Oral  
  
We Performed the Following REFERRAL TO PAIN MANAGEMENT [UEO357 Custom] REFERRAL TO UROLOGY [HFM457 Custom] Referral Information Referral ID Referred By Referred To  
  
 [id number] Alvaro Faria MD   
   [address] [facility] 52 Phone: [phone] Fax: [phone] Visits Status Start Date End Date 1 New Request 9/13/18 9/13/19 If your referral has a status of pending review or denied, additional information will be sent to support the outcome of this decision. Referral ID Referred By Referred To  
 [id number] 104 Viridiana Faria MD  
   [address] [place] [address] Phone: [phone] Fax: [phone] Visits Status Start Date End Date 1 New Request 9/13/18 9/13/19 If your referral has a status of pending review or denied, additional information will be sent to support the outcome of this decision. Introducing [facility]!    
 Linda Toscano introduces marshallindex patient portal. Now you can access parts of your medical record, email your doctor's office, and request medication refills online. 1. In your internet browser, go to [email or web address] 2. Click on the First Time User? Click Here link in the Sign In box. You will see the New Member Sign Up page. 3. Enter your "[employer] Access Code exactly as it appears below. You will not need to use this code after youve completed the sign-up process. If you do not sign up before the expiration date, you must request a new code. · "[employer] Access Code: [email or web address] Expires: 12/12/2018  2:19 PM 
 
4. Enter the last four digits of your Social Security Number (xxxx) and Date of Birth (mm/dd/yyyy) as indicated and click Submit. You will be taken to the next sign-up page. 5. Create a "[employer] ID. This will be your "[employer] login ID and cannot be changed, so think of one that is secure and easy to remember. 6. Create a "[employer] password. You can change your password at any time. 7. Enter your Password Reset Question and Answer. This can be used at a later time if you forget your password. 8. Enter your e-mail address. You will receive e-mail notification when new information is available in [address] 9. Click Sign Up. You can now view and download portions of your medical record. 10. Click the Download Summary menu link to download a portable copy of your medical information. If you have questions, please visit the Frequently Asked Questions section of the "[employer] website. Remember, "[employer] is NOT to be used for urgent needs. For medical emergencies, dial 911. Now available from your iPhone and Android! Please provide this summary of care documentation to your next provider. Your primary care clinician is listed as Javed 13. If you have any questions after today's visit, please call [phone].

## 2018-09-13 NOTE — PROGRESS NOTES
Grace Mayer is a 80 y.o. female (: 1950) presenting to address:    Chief Complaint   Patient presents with    Transitions Of Care    Medication Refill       Vitals:    18 1323   BP: 130/80   Pulse: 64   Resp: 18   Temp: 98.7 °F (37.1 °C)   TempSrc: Oral   SpO2: 99%   Weight: 163 lb (73.9 kg)   Height: 5' 0.5\" (1.537 m)   PainSc:   0 - No pain       Learning Assessment:     Learning Assessment 2017   PRIMARY LEARNER Patient   HIGHEST LEVEL OF EDUCATION - PRIMARY LEARNER  4 YEARS OF COLLEGE   BARRIERS PRIMARY LEARNER NONE   CO-LEARNER CAREGIVER No   PRIMARY LANGUAGE OTHER (COMMENT)    NEED No   LEARNER PREFERENCE PRIMARY READING   LEARNING SPECIAL TOPICS none   ANSWERED BY patient   RELATIONSHIP SELF   ASSESSMENT COMMENT n/a     Depression Screening:     PHQ over the last two weeks 2017   Little interest or pleasure in doing things Not at all   Feeling down, depressed, irritable, or hopeless Not at all   Total Score PHQ 2 0     Fall Risk Assessment:     Fall Risk Assessment, last 12 mths 2018   Able to walk? No   Fall in past 12 months? -     Abuse Screening:     Abuse Screening Questionnaire 3/6/2018   Do you ever feel afraid of your partner? N   Are you in a relationship with someone who physically or mentally threatens you? N   Is it safe for you to go home? Y     Coordination of Care Questionaire:   1. Have you been to the ER, urgent care clinic since your last visit? Hospitalized since your last visit? YES [facility], Re[facility]    2. Have you seen or consulted any other health care providers outside of the [address] [place] [address] since your last visit? Include any pap smears or colon screening. YES Neurology 18 Urology 2018    Advanced Directive:   1. Do you have an Advanced Directive? YES    2. Would you like information on Advanced Directives?  NO

## 2018-09-27 RX ORDER — NYSTATIN 100000 [USP'U]/G
POWDER TOPICAL
Qty: 60 G | Refills: 1 | Status: SHIPPED | OUTPATIENT
Start: 2018-09-27 | End: 2020-08-17

## 2018-09-27 RX ORDER — PREGABALIN 100 MG/1
100 CAPSULE ORAL
Qty: 30 CAP | Refills: 2 | Status: SHIPPED | OUTPATIENT
Start: 2018-09-27

## 2018-09-27 RX ORDER — PREGABALIN 50 MG/1
50 CAPSULE ORAL
Qty: 90 CAP | Refills: 0 | Status: CANCELLED | OUTPATIENT
Start: 2018-09-27

## 2018-09-27 RX ORDER — PREGABALIN 50 MG/1
50 CAPSULE ORAL 2 TIMES DAILY
Qty: 60 CAP | Refills: 2 | Status: SHIPPED | OUTPATIENT
Start: 2018-09-27 | End: 2020-08-17

## 2018-09-27 RX ORDER — PREGABALIN 100 MG/1
100 CAPSULE ORAL
Qty: 90 CAP | Refills: 0 | Status: CANCELLED | OUTPATIENT
Start: 2018-09-27

## 2018-09-27 NOTE — TELEPHONE ENCOUNTER
Pt called and stated that this was the fourth time that she had called asking for Anisa. I tried to reach Alan Masters several times but she was not available so I coaxed the information from her. She is stating that she needs her lyrica medication refilled asap. I saw that Dr Ann Marie Caballero just sent in a refill back on 9/13/18 and asked if she picked that up and she said yes she did but it did not come with refills. She said that she has 19 pills left of the 100mg and she has 16 pills of the 50mg.     Pt stated that she is taking the 50mg lyrica twice a day one in the morning and one at 5pm which is not what her instruction say on her medications list. Please advise

## 2018-09-27 NOTE — TELEPHONE ENCOUNTER
Please clarify what she is doing. Even if she was taking 50mg bid, she had 90 dispensed, which should last until end of next month (written 9/13).

## 2018-09-28 ENCOUNTER — TELEPHONE (OUTPATIENT)
Dept: FAMILY MEDICINE CLINIC | Age: 68
End: 2018-09-28

## 2018-09-28 NOTE — TELEPHONE ENCOUNTER
Pt's  called to let us know that he will be by to p/u pt's rx's that are ready. He also wanted to ask if  could please write a letter for the fentaNYL ([address]) 50 mcg/hr PATCH for , states  won't fill it without a letter from the doctor. If he can get it today with the prescriptions that would be wonderful, if not please call him when he can pick it up.

## 2018-09-28 NOTE — TELEPHONE ENCOUNTER
Called pt, no answer, left voicemail for pt to call back for more clarification on what is needed.  A letter stating ok to dispense, a prior auth, etc. We don't have enough detail on this request.

## 2018-10-03 NOTE — TELEPHONE ENCOUNTER
Pt spouse returned call. Per  Chasity Natalie, they have the Rx but are requesting a letter stating this is the appropriate medication for the patient and to please dispense fentanyl patches to her. He said they will use this to authorize the medication.

## 2018-10-11 ENCOUNTER — TELEPHONE (OUTPATIENT)
Dept: FAMILY MEDICINE CLINIC | Age: 68
End: 2018-10-11

## 2018-10-11 DIAGNOSIS — R19.7 DIARRHEA, UNSPECIFIED TYPE: Primary | ICD-10-CM

## 2018-10-11 DIAGNOSIS — S24.109A INJURY OF THORACIC SPINAL CORD, INITIAL ENCOUNTER (HCC): ICD-10-CM

## 2018-10-11 NOTE — TELEPHONE ENCOUNTER
Jeremie Lamb [facility] nurse called stating pt has had diarrhea x 6 days. She stopped the dulcolax 8 days ago. She tried Immodium 3 doses yesterday. Still having at least 4 loose stools daily.

## 2018-10-12 ENCOUNTER — HOSPITAL ENCOUNTER (OUTPATIENT)
Dept: LAB | Age: 68
Discharge: HOME | End: 2018-10-12
Payer: MEDICARE

## 2018-10-12 DIAGNOSIS — R19.7 DIARRHEA, UNSPECIFIED TYPE: ICD-10-CM

## 2018-10-12 LAB
BACTERIA SPEC CULT: NORMAL
SERVICE CMNT-IMP: NORMAL

## 2018-10-12 PROCEDURE — 87493 C DIFF AMPLIFIED PROBE: CPT | Performed by: INTERNAL MEDICINE

## 2018-10-12 RX ORDER — FENTANYL 50 UG/1
1 PATCH TRANSDERMAL
Qty: 10 PATCH | Refills: 0 | Status: SHIPPED | OUTPATIENT
Start: 2018-10-12 | End: 2018-10-29 | Stop reason: SDUPTHER

## 2018-10-18 ENCOUNTER — PATIENT OUTREACH (OUTPATIENT)
Dept: FAMILY MEDICINE CLINIC | Age: 68
End: 2018-10-18

## 2018-10-18 PROBLEM — L89.159 DECUBITUS ULCER OF SACRAL REGION: Status: RESOLVED | Noted: 2018-09-13 | Resolved: 2018-10-18

## 2018-10-18 PROBLEM — L89.153 DECUBITUS ULCER OF COCCYGEAL REGION, STAGE 3 (HCC): Status: ACTIVE | Noted: 2018-10-18

## 2018-10-18 NOTE — Clinical Note
Patient d/c from [facility] 10/16/2018 had to leave a message for return call to do LEIF follow up

## 2018-10-18 NOTE — PROGRESS NOTES
[facility] Discharge Follow-Up Date/Time:  10/18/2018 4:19 PM 
 
Patient was admitted to [facility] on 10/13/2018 and discharged on 15/38/1981 for Complicated UTI. The physician discharge summary was available at the time of outreach. NN made out reach call had to leave a message for return call. Method of communication with provider :chart routing Inpatient RRAT score12 Was this a readmission? no  
Patient stated reason for the readmission: n/a Disease Specific:   N/A 
 
 
 
[facility] follow up appointment(s):  
Future Appointments Date Time Provider Marcin June 12/13/2018  1:00 PM MD YO Lam  
1/18/2019  1:15 PM Jaren Vásquez MD [address] [facility]

## 2018-10-19 ENCOUNTER — PATIENT OUTREACH (OUTPATIENT)
Dept: FAMILY MEDICINE CLINIC | Age: 68
End: 2018-10-19

## 2018-10-19 PROBLEM — R33.9 URINARY RETENTION: Status: ACTIVE | Noted: 2018-10-19

## 2018-10-19 PROBLEM — Z86.14 HISTORY OF MRSA INFECTION: Status: ACTIVE | Noted: 2018-10-19

## 2018-10-19 PROBLEM — T40.2X5A CONSTIPATION DUE TO OPIOID THERAPY: Status: RESOLVED | Noted: 2018-09-13 | Resolved: 2018-10-19

## 2018-10-19 PROBLEM — K59.03 CONSTIPATION DUE TO OPIOID THERAPY: Status: RESOLVED | Noted: 2018-09-13 | Resolved: 2018-10-19

## 2018-10-19 NOTE — PROGRESS NOTES
[facility] Discharge Follow-Up Date/Time:  10/19/2018 12:34 PM 
 
Patient was admitted to [facility] on 10/13/2018 and discharged on 63/63/4078 for Complicated UTI. The physician discharge summary was available at the time of outreach. Patient returned NN call to the  made her LEIF f/u appointment but told PSR that she did not have time to speak to the nurse navigator' See below for appointment Method of communication with provider :chart routing Inpatient RRAT score12 Was this a readmission? no  
Patient stated reason for the readmission: n/a Disease Specific:   N/A 
 
 
 
[facility] follow up appointment(s):  
Future Appointments Date Time Provider Marcin Shaylee 10/29/2018  1:00 PM MD YO Lam  
12/13/2018  1:00 PM Daria Segovia MD [facility]  
1/18/2019  1:15 PM Jaren Vásquez MD [address] [facility]

## 2018-10-29 ENCOUNTER — OFFICE VISIT (OUTPATIENT)
Dept: FAMILY MEDICINE CLINIC | Age: 68
End: 2018-10-29

## 2018-10-29 VITALS
TEMPERATURE: 98.6 F | HEIGHT: 62 IN | BODY MASS INDEX: 29.44 KG/M2 | RESPIRATION RATE: 18 BRPM | SYSTOLIC BLOOD PRESSURE: 138 MMHG | OXYGEN SATURATION: 99 % | WEIGHT: 160 LBS | HEART RATE: 84 BPM | DIASTOLIC BLOOD PRESSURE: 86 MMHG

## 2018-10-29 DIAGNOSIS — L89.153 DECUBITUS ULCER OF COCCYGEAL REGION, STAGE 3 (HCC): ICD-10-CM

## 2018-10-29 DIAGNOSIS — R07.9 CHEST PAIN, UNSPECIFIED TYPE: ICD-10-CM

## 2018-10-29 DIAGNOSIS — N31.9 NEUROGENIC BLADDER: ICD-10-CM

## 2018-10-29 DIAGNOSIS — S24.109A INJURY OF THORACIC SPINAL CORD, INITIAL ENCOUNTER (HCC): ICD-10-CM

## 2018-10-29 DIAGNOSIS — R19.7 DIARRHEA, UNSPECIFIED TYPE: ICD-10-CM

## 2018-10-29 DIAGNOSIS — G82.20 PARAPLEGIA (HCC): ICD-10-CM

## 2018-10-29 DIAGNOSIS — R23.4 ESCHAR OF LOWER LEG: ICD-10-CM

## 2018-10-29 DIAGNOSIS — N39.0 COMPLICATED UTI (URINARY TRACT INFECTION): Primary | ICD-10-CM

## 2018-10-29 RX ORDER — FENTANYL 50 UG/1
1 PATCH TRANSDERMAL
Qty: 10 PATCH | Refills: 0 | Status: SHIPPED | OUTPATIENT
Start: 2018-11-12 | End: 2020-08-17

## 2018-10-29 RX ORDER — OXYCODONE HYDROCHLORIDE 10 MG/1
TABLET ORAL
Qty: 30 TAB | Refills: 0 | Status: SHIPPED | OUTPATIENT
Start: 2018-10-29 | End: 2020-08-17

## 2018-10-29 NOTE — PATIENT INSTRUCTIONS
Cardiac Perfusion Scan (Medicine): About This Test  What is it? A cardiac perfusion scan measures the amount of blood in your heart muscle at rest and after your heart has been made to work hard. During the scan, a camera takes pictures of your heart after a radioactive tracer is injected into a vein in your arm. The tracer travels through the blood and into your heart. As the tracer moves through your heart, areas that have good blood flow absorb the tracer. Areas that do not absorb the tracer may not be getting enough blood or may have been damaged by a heart attack. The pictures show this difference. Two sets of pictures may be made during the test. One set is taken while you are resting. Another set is taken after your heart has been made to work harder (called a stress test). The heart can be stressed by using medicine or exercise. This information is about using medicine to stress the heart. This test is also known by other names, including myocardial perfusion scan, myocardial perfusion imaging, thallium scan, sestamibi cardiac scan, and nuclear stress test.  Why is this test done? The test is often done to find out what may be causing chest pain or pressure. It may be done after a heart attack to see if areas of the heart are not getting enough blood or to find out how much your heart has been damaged from the heart attack. How can you prepare for the test?  Tell your doctor if:  · You take medicine for an erection problem, such as sildenafil (Viagra), tadalafil (Cialis), and vardenafil (Levitra). You may need to take nitroglycerin during this test, which can cause a serious reaction if you have taken a medicine for an erection problem within the past 48 hours. · You have had bleeding problems, or if you take aspirin or some other blood thinner. · You are or might be pregnant. · You are breastfeeding. Don't use your breast milk for 1 to 2 days after the scan. Use formula instead.   Do not have any caffeine, such as coffee or tea, for 24 hours before the test.  What happens during the test?  Resting or baseline scan  · You will take your top off and be given a gown to wear. · Electrodes will be attached to your chest to keep track of your heartbeats. · Your arm will be cleaned. You will have a tube, called an IV, going into your arm. A small amount of the radioactive tracer will be put in the IV. · You will lie on your back or your stomach on a table with a large camera positioned above your chest. The camera records the tracer's signals as it moves through your blood. The camera does not produce any radiation, so you are not exposed to any additional radiation while the scan is being done. · You will be asked to remain very still during each scan, which takes about 5 to 10 minutes. The camera will move to take more pictures at different angles. Several scans will be taken. This test takes about 30 to 40 minutes. Stress scan using medicine  The stress scan is done in two parts. In many hospitals, you first have the resting scan. You are then given a medicine that makes your heart work harder and you have another scan. Sometimes the stress scan is done first.  · You will have a test called an electrocardiogram (EKG or ECG), which takes about 5 to 10 minutes. You may have other EKGs during and after the stress test.  · Medicine will be put in your IV. It will make your heart work harder. You may get a headache and feel dizzy, flushed, and nauseated from the medicine, but these symptoms usually do not last long. · Your heartbeat and blood pressure may be checked. · Your symptoms such as chest pain and shortness of breath will be checked. · A few minutes after you get the medicine, another small amount of radioactive tracer is injected. You may be given something to reverse the medicine used to make your heart work harder. · You will wait for 30 to 40 minutes and then have another resting scan.  See the \"Resting or baseline scan\" section. This test takes about 30 to 40 minutes. What else should you know about the test?  · Sometimes more pictures are taken 2 to 4 hours after the stress scan. · No electricity passes through your body during the test. There is no danger of getting an electrical shock. What happens after the test?  · You can go back to your usual activities right away. · You will probably be able to go home right away. · Drink plenty of fluids for the next 24 hours to help flush the tracer out of your body. If you have kidney, heart, or liver disease and have to limit fluids, talk with your doctor before you increase the amount of fluids you drink. When should you call for help? Call 911 anytime you think you may need emergency care. For example, call if:  · You passed out (lost consciousness). · You have been diagnosed with angina, and you have angina symptoms that do not go away with rest or are not getting better within 5 minutes after you take a dose of nitroglycerin. · You have symptoms of a heart attack. These may include:  ? Chest pain or pressure, or a strange feeling in the chest.  ? Sweating. ? Shortness of breath. ? Nausea or vomiting. ? Pain, pressure, or a strange feeling in the back, neck, jaw, or upper belly or in one or both shoulders or arms. ? Lightheadedness or sudden weakness. ? A fast or irregular heartbeat. After you call 911, the  may tell you to chew 1 adult-strength or 2 to 4 low-dose aspirin. Wait for an ambulance. Do not try to drive yourself. Watch closely for changes in your health, and be sure to contact your doctor if you have any problems. Follow-up care is a key part of your treatment and safety. Be sure to make and go to all appointments, and call your doctor if you are having problems. It's also a good idea to keep a list of the medicines you take. Ask your doctor when you can expect to have your test results.   Where can you learn more?  Go to http://yon-bola.info/. Enter R320 in the search box to learn more about \"Cardiac Perfusion Scan (Medicine): About This Test.\"  Current as of: December 6, 2017  Content Version: 11.8  © 3790-8536 [employer]. Care instructions adapted under license by [employer] (which disclaims liability or warranty for this information). If you have questions about a medical condition or this instruction, always ask your healthcare professional. [address] any warranty or liability for your use of this information.

## 2018-10-29 NOTE — PROGRESS NOTES
Assessment/Plan:    1. Complicated UTI (urinary tract infection) with neurogenic bladder   -with indwelling butler. Managed by urology. Apparently had MRSA on culture - but 30,000 colonies and this was second culture. 2. Paraplegia (HCC)  -stable    3. Decubitus ulcer of coccygeal region, stage 3 ([facility]  -has home health wound care. They will also address the escar on leg. Pressure relieving mattress at home    5. Chest pain, unspecified type  -atypical.  Ck NST.  - AMB POC EKG ROUTINE W/ 12 LEADS, INTER & REP  - NUCLEAR CARDIAC STRESS TEST; Future    6. Injury of thoracic spinal cord, initial encounter ([facility]   -refilled. Has appt with Dr. Alexis Norris 11/20.  - fentaNYL (DURAGESIC) 50 mcg/hr PATCH; 1 Patch by TransDERmal route every seventy-two (72) hours. Max Daily Amount: 1 Patch. Dispense: 10 Patch; Refill: 0  - oxyCODONE IR (ROXICODONE) 10 mg tab immediate release tablet; 1/2-1 tab po qhs prn pain  Dispense: 30 Tab; Refill: 0    7. Diarrhea, unspecified type  c diff neg. Likely med side effect. Hold vit C.    9. Eschar of lower leg  -wound care      The plan was discussed with the patient. The patient verbalized understanding and is in agreement with the plan. All medication potential side effects were discussed with the patient.     Health Maintenance:   Health Maintenance   Topic Date Due    Shingrix Vaccine Age 49> (1 of 2) 06/29/2001    Pneumococcal 65+ Low/Medium Risk (2 of 2 - PPSV23) 08/09/2017    COLONOSCOPY  06/13/2018    MEDICARE YEARLY EXAM  03/07/2019    GLAUCOMA SCREENING Q2Y  11/17/2019    BREAST CANCER SCRN MAMMOGRAM  12/28/2019    Bone Densitometry  12/28/2022    DTaP/Tdap/Td series (3 - Td) 12/01/2027    Hepatitis C Screening  Completed    Bone Densitometry (Dexa) Screening  Completed    Influenza Age 5 to Adult  Completed       Timmy Woods is a 80 y.o. female and presents with Hospital Follow Up     Subjective:  Pt was recently in hospital 36/40-61/99 for complicated UTI.  She is followed by urology for neurogenic bladder with chronic indwelling butler. Apparently she had a clogged butler prior to admission (a nurse friend changed it) and shortly thereafter, had chills. Had leukocytosis upon admission. Was treated with IV rocephin and then transitioned to keflex. 2nd Urine culture grew [id number] MRSA. 1st urine grew multiple/contaminated. She's been afebrile since discharge. States she's feeling better. She has pain after her thoracic level spinal cord stroke. She is on fentanyl, oxycodone. Has appt with pain management on 11/20/18 with Dr. Manny Vick. She is on lyrica as well. She has a worsening pressure ulcer, 2.4 x 2.5cm. She has [facility] wound care/home health, they changed treatment plan during hospitalization. She is coming tomorrow. Doesn't have pressure relieving mattress. She c/o L chest pain, over breast. No change with breathing. States it's an 'annoying pain'. Pain isn't reproducible with touch. Pain radiates into axilla. ROS:  Constitutional: No recent weight change. No weakness/fatigue. No f/c. Cardiovascular: No CP/palpitations. No WOODRUFF/orthopnea/PND. Respiratory: No cough/sputum, dyspnea, wheezing. Gastointestinal: No dysphagia, reflux. No n/v. No constipation/+diarrhea. No melena/rectal bleeding. Neurological: No seizures/numbness/weakness. No paresthesias. Psychiatric:  No depression, anxiety. The problem list was updated as a part of today's visit.   Patient Active Problem List   Diagnosis Code    Essential hypertension I10    Elevated blood sugar R73.9    Transaminitis R74.0    Vitamin D deficiency E55.9    Calculus of gallbladder without cholecystitis without obstruction K80.20    Hepatic cyst K76.89    Lumbar radicular pain M54.16    Thoracic spinal cord injury ([address] S24.109A    Paraplegia ([address] G82.20    Full incontinence of feces R15.9    Decubitus ulcer of coccygeal region, stage 3 ([address] L89.153    History of MRSA infection Z86.14    Urinary retention R33.9       The PSH, FH were reviewed. SH:  Social History     Tobacco Use    Smoking status: Never Smoker    Smokeless tobacco: Never Used   Substance Use Topics    Alcohol use: Yes     Comment: Socially    Drug use: No       Medications/Allergies:  Current Outpatient Medications on File Prior to Visit   Medication Sig Dispense Refill    naloxone (NARCAN) 4 mg/actuation nasal spray 4 mg.  fentaNYL (DURAGESIC) 50 mcg/hr PATCH 1 Patch by TransDERmal route every seventy-two (72) hours. Max Daily Amount: 1 Patch. 10 Patch 0    pregabalin (LYRICA) 50 mg capsule Take 1 Cap by mouth two (2) times a day. Max Daily Amount: 100 mg. 60 Cap 2    pregabalin (LYRICA) 100 mg capsule Take 1 Cap by mouth nightly. Max Daily Amount: 100 mg. 30 Cap 2    nystatin (MYCOSTATIN) powder Apply  to affected area two (2) times daily as needed. Apply per wound care recs 60 g 1    acetaminophen (TYLENOL) 650 mg/20.3 mL solution Take  by mouth every four (4) hours as needed for Fever.  aluminum hydrox-magnesium carb (GAVISCON)  mg/15 mL suspension Take 15 mL by mouth every six (6) hours as needed for Indigestion.  albuterol (PROVENTIL HFA, VENTOLIN HFA, PROAIR HFA) 90 mcg/actuation inhaler Take  by inhalation.  bisacodyl (DULCOLAX) 5 mg EC tablet Take 1 Tab by mouth daily. 90 Tab 1    ondansetron (ZOFRAN ODT) 4 mg disintegrating tablet Take 1 Tab by mouth every eight (8) hours as needed for Nausea. 90 Tab 0    amitriptyline (ELAVIL) 10 mg tablet Take 1 Tab by mouth nightly. 90 Tab 3    hydrocortisone (PREPARATION H HYDROCORTISONE) 1 % topical cream Apply  to affected area two (2) times a day. use thin layer 30 g 6    methenamine hippurate (HIPREX) 1 gram tablet Take 1 Tab by mouth two (2) times daily (with meals). 180 Tab 3    ascorbic acid, vitamin C, (VITAMIN C) 500 mg tablet Take 1 Tab by mouth daily.  90 Tab 3    cholecalciferol (VITAMIN D3) 1,000 unit tablet Take 1 Tab by mouth daily. 90 Tab 3    carboxymethylcellulose sodium (REFRESH TEARS) 0.5 % drop ophthalmic solution Administer 1 Drop to both eyes two (2) times a day. 30 mL 11    loratadine (CLARITIN) 10 mg tablet Take 1 Tab by mouth daily as needed for Allergies. 90 Tab 3    metoprolol tartrate (LOPRESSOR) 25 mg tablet Take 0.5 Tabs by mouth two (2) times a day. 90 Tab 1    baclofen (LIORESAL) 10 mg tablet Take 1 Tab by mouth nightly. 90 Tab 3    oxyCODONE IR (ROXICODONE) 10 mg tab immediate release tablet 1/2-1 tab po qhs prn pain 30 Tab 0    ibuprofen (MOTRIN) 800 mg tablet Take 1 Tab by mouth every eight (8) hours as needed for Pain. 90 Tab 1     No current facility-administered medications on file prior to visit. Allergies   Allergen Reactions    Allopurinol Nausea and Vomiting    Norvasc [Amlodipine] Itching and Cough    Neurontin [Gabapentin] Other (comments)     Shaking, headache    Prednisone Other (comments)     Headache       Objective:  Visit Vitals  /86 (BP 1 Location: Left arm, BP Patient Position: Sitting)   Pulse 84   Temp 98.6 °F (37 °C) (Oral)   Resp 18   Ht 5' 2\" (1.575 m)   Wt 160 lb (72.6 kg)   SpO2 99%   BMI 29.26 kg/m²      Constitutional: Well developed, nourished, no distress, alert   CV: S1, S2.  RRR. No murmurs/rubs. No thrills palpated. No carotid bruits. Intact distal pulses. No edema. No aortic bruits. Pulm: No abnormalities on inspection. Clear to auscultation bilaterally. No wheezing/rhonchi. Normal effort. GI: Soft, nontender, nondistended. Normal active bowel sounds. Psych: Appropriate affect, judgement and insight. Short-term memory intact. Skin\" 1cm black eschar wound posterior L calf    EKG: NSR, Q wave in AVR only.

## 2018-10-29 NOTE — PROGRESS NOTES
Tiffanie Mario is a 80 y.o. female (: 1950) presenting to address:  Patient has already received the flu vaccine. Chief Complaint   Patient presents with   [facility] Follow Up       Vitals:    10/29/18 1253   BP: 138/86   Pulse: 84   Resp: 18   Temp: 98.6 °F (37 °C)   TempSrc: Oral   SpO2: 99%   Weight: 160 lb (72.6 kg)   Height: 5' 2\" (1.575 m)   PainSc:   0 - No pain       Hearing/Vision:   No exam data present    Learning Assessment:     Learning Assessment 2017   PRIMARY LEARNER Patient   HIGHEST LEVEL OF EDUCATION - PRIMARY LEARNER  4 YEARS OF COLLEGE   BARRIERS PRIMARY LEARNER NONE   CO-LEARNER CAREGIVER No   PRIMARY LANGUAGE OTHER (COMMENT)    NEED No   LEARNER PREFERENCE PRIMARY READING   LEARNING SPECIAL TOPICS none   ANSWERED BY patient   RELATIONSHIP SELF   ASSESSMENT COMMENT n/a     Depression Screening:     PHQ over the last two weeks 2017   Little interest or pleasure in doing things Not at all   Feeling down, depressed, irritable, or hopeless Not at all   Total Score PHQ 2 0     Fall Risk Assessment:     Fall Risk Assessment, last 12 mths 2018   Able to walk? No   Fall in past 12 months? -     Abuse Screening:     Abuse Screening Questionnaire 3/6/2018   Do you ever feel afraid of your partner? N   Are you in a relationship with someone who physically or mentally threatens you? N   Is it safe for you to go home? Y     Coordination of Care Questionaire:   1. Have you been to the ER, urgent care clinic since your last visit? Hospitalized since your last visit? YES Deisy Quiroga 10/13/18 to 10/16/18 for UTI    2. Have you seen or consulted any other health care providers outside of the [facility] since your last visit? Include any pap smears or colon screening. YES Dr. Guerline Garcia Urologist   10/18/18    Advanced Directive:   1. Do you have an Advanced Directive? YES    2. Would you like information on Advanced Directives?  NO

## 2018-10-30 ENCOUNTER — TELEPHONE (OUTPATIENT)
Dept: FAMILY MEDICINE CLINIC | Age: 68
End: 2018-10-30

## 2018-10-31 ENCOUNTER — TELEPHONE (OUTPATIENT)
Dept: FAMILY MEDICINE CLINIC | Age: 68
End: 2018-10-31

## 2018-10-31 NOTE — TELEPHONE ENCOUNTER
Have tried multiple companies for the order for an alternating pressure relieving mattress for her [employer] hspital bed. No from [place],  [employer] (gel overlay only), Chris and [address] [place] [address] Rd.. [employer] advised that Medicare hasn't paid for the original bed so they won't pay for an upgraded mattress. Spouse is asking for a new order for electric bed with this alt. Pressure mattress.

## 2018-11-02 ENCOUNTER — TELEPHONE (OUTPATIENT)
Dept: FAMILY MEDICINE CLINIC | Age: 68
End: 2018-11-02

## 2018-11-02 NOTE — TELEPHONE ENCOUNTER
[facility] wound nurse called about pt's wound. They said they need a Rx for Santyl to the DOD. Also they are asking for a surgery referral to general surgery for possible debridement.

## 2018-11-07 ENCOUNTER — TELEPHONE (OUTPATIENT)
Dept: FAMILY MEDICINE CLINIC | Age: 68
End: 2018-11-07

## 2018-11-07 NOTE — TELEPHONE ENCOUNTER
called ,leaving message, asking about med for wound. Santyl was sent 11/2 as requested. Left msg with that information.

## 2018-11-15 NOTE — TELEPHONE ENCOUNTER
Spouse bought a new mattress for her current bed himself, stated that even with insurance, it was too expensive to get it through insurance.

## 2018-11-19 ENCOUNTER — TELEPHONE (OUTPATIENT)
Dept: FAMILY MEDICINE CLINIC | Age: 68
End: 2018-11-19

## 2018-11-19 NOTE — TELEPHONE ENCOUNTER
Spouse called. [facility] clinic never called to schedule them. Pt starting running fevers 11/17/18 up to 105 F. They tried to bring it down at home, taken to [facility] 11/18/18, admitted. Pt has surgery consult to look at debridement, talking about possible colostomy. Possible uti as well. Spouse will updating us.

## 2018-11-26 ENCOUNTER — PATIENT OUTREACH (OUTPATIENT)
Dept: FAMILY MEDICINE CLINIC | Age: 68
End: 2018-11-26

## 2018-11-29 ENCOUNTER — TELEPHONE (OUTPATIENT)
Dept: FAMILY MEDICINE CLINIC | Age: 68
End: 2018-11-29

## 2018-11-29 NOTE — TELEPHONE ENCOUNTER
Called spouse to check on need for wound debridement for pt. Per spouse, pt is at [facility] after her hospitalization for the duration of IV abs. He said it is for 37 days. They were informed that home IV admin would be too costly. Pt is having weekly blood cultures and treating sacral wound with wet to dry dsg. Per spouse, hospital did not want pt having wound debrided.

## 2018-12-19 PROBLEM — M86.28 SUBACUTE OSTEOMYELITIS, OTHER SITE (HCC): Status: ACTIVE | Noted: 2018-12-19

## 2018-12-19 PROBLEM — Z86.19 HISTORY OF CLOSTRIDIUM DIFFICILE COLITIS: Status: ACTIVE | Noted: 2018-12-19

## 2019-02-05 ENCOUNTER — TELEPHONE (OUTPATIENT)
Dept: FAMILY MEDICINE CLINIC | Age: 69
End: 2019-02-05

## 2019-02-05 NOTE — TELEPHONE ENCOUNTER
Nurse Jose Parmar returned call. She is the RN from [employer]. Today was her initial eval. She says the pt did not appear ill. She was surprised her vitals were so poor. She said the patient called her daughter who is a PA in [facility]. She asked that the [facility] nurse collect a urine sample for culture, pt to take 1000 mg Tylenol and take a metoprolol. [facility] did not collect any samples, no orders from pcp.

## 2019-02-05 NOTE — TELEPHONE ENCOUNTER
Zarina Santos the nurse from is calling to state that the patient has a temp of101.8, /120,  not going down, saturation 91-92, cough, scratchy throat started this am, butler cath draining fine.   Do you want to send patient to ER?

## 2019-02-05 NOTE — TELEPHONE ENCOUNTER
Returned call to nurse. This should have been brought to the attention of a nurse or dr on the spot. No answer for nurse, left detailed message to take pt to ED. Called the 's phone on emergency contact. Reached pt at home. Advised pt to go to hospital for immediate eval per Dr. Lexx Silva. Pt says her fever is going down to 100.7 and asks if she should go to the hospital if it goes back up. Reiterated to pt that she needs to be evaluated as hospital as all of her vitals are poor at this time. She said her  isn't home but she would tell him and then hung up.

## 2019-02-13 PROBLEM — L89.154 PRESSURE INJURY OF SACRAL REGION, STAGE 4 (HCC): Status: ACTIVE | Noted: 2018-09-13

## 2019-02-13 PROBLEM — I26.99 OTHER PULMONARY EMBOLISM WITHOUT ACUTE COR PULMONALE (HCC): Status: ACTIVE | Noted: 2019-02-13

## 2019-02-18 ENCOUNTER — TELEPHONE (OUTPATIENT)
Dept: FAMILY MEDICINE CLINIC | Age: 69
End: 2019-02-18

## 2019-02-18 ENCOUNTER — PATIENT OUTREACH (OUTPATIENT)
Dept: FAMILY MEDICINE CLINIC | Age: 69
End: 2019-02-18

## 2019-02-18 NOTE — Clinical Note
Read notes on patient and  updated on why she can not be seen at this time due to stretcher transport. I will check in on her for any needs x 30 days

## 2019-02-18 NOTE — TELEPHONE ENCOUNTER
Pt's spouse called to report a ED visit for accidental ingestion of double oxycodone. She is fine but is being treated again for small UTI. Spouse called to set up post hospital visit. Pt has a stage 4 pressure ulcer, on a wound vac and has paraplegia so she needs stretcher transport. I called [employer] [phone] f [id number] who was going to set up transport for a November visit to the wound clinic for debridement. They state Medicare won't cover transport for office visits and offered a private pay rate of $533. Family cannot afford this, is looking for options. Willing to use a visiting physician, need more information.

## 2019-02-18 NOTE — TELEPHONE ENCOUNTER
Ms Bobby Youngblood called to inform Dr Reynolds Silence that the pt has been released from the hospital and they are admitting her to home health care.

## 2019-02-19 PROBLEM — D63.8 ANEMIA IN OTHER CHRONIC DISEASES CLASSIFIED ELSEWHERE: Status: ACTIVE | Noted: 2018-11-19

## 2019-02-19 PROBLEM — N30.00 ACUTE CYSTITIS WITHOUT HEMATURIA: Status: ACTIVE | Noted: 2018-12-06

## 2019-02-19 PROBLEM — E87.1 ACUTE HYPONATREMIA: Status: ACTIVE | Noted: 2018-11-19

## 2019-02-19 RX ORDER — ALPRAZOLAM 0.5 MG/1
0.5 TABLET ORAL
COMMUNITY
Start: 2019-01-02 | End: 2020-08-17

## 2019-02-19 RX ORDER — CEPHALEXIN 500 MG/1
1 CAPSULE ORAL 2 TIMES DAILY
Refills: 0 | COMMUNITY
Start: 2019-02-17 | End: 2020-08-17

## 2019-02-19 NOTE — TELEPHONE ENCOUNTER
Spoke to spouse. [facility] . They have a wait list until April. Pt has been placed on the wait list. Spouse is aware. Wound care HH was written by the hospital and will have to be continually managed by them. She is getting dressing changes M, W, F per spouse.

## 2019-02-19 NOTE — TELEPHONE ENCOUNTER
Please put them in contact with [facility] to see if they can get her plugged in with pcp who can see her at home

## 2019-02-20 NOTE — PROGRESS NOTES
NN spoke to  at length . He did call office and speak to Dr Mann Bustillo concerning patient not being able to sit due to the stage 4 decubitus on her sacrum. She also has a colostomy very weak. He did look in too stretcher transportation but it was over $500.00 round trip. They could not afford that. [facility] was offered and he called but they don't have anyone to come out if she meets criteria until April 2019. She has everything that she needs as of now, but can not come see her as of now. Dr Mann Bustillo is aware asked him to call for any refills or medical needs. Will leave this episode open 30 days

## 2019-02-27 PROBLEM — Z93.3 COLOSTOMY PRESENT (HCC): Status: ACTIVE | Noted: 2019-02-27

## 2019-02-28 ENCOUNTER — PATIENT OUTREACH (OUTPATIENT)
Dept: FAMILY MEDICINE CLINIC | Age: 69
End: 2019-02-28

## 2019-03-07 NOTE — PROGRESS NOTES
said all is going well for now, they have no needs from Dr Toña Saravia at this call. NN will continue to monitor x 30 days for any readmissions

## 2019-03-11 ENCOUNTER — PATIENT OUTREACH (OUTPATIENT)
Dept: FAMILY MEDICINE CLINIC | Age: 69
End: 2019-03-11

## 2019-03-13 RX ORDER — FOLIC ACID 1 MG/1
1 TABLET ORAL DAILY
COMMUNITY
Start: 2019-02-05 | End: 2020-08-17

## 2019-03-13 RX ORDER — IPRATROPIUM BROMIDE AND ALBUTEROL SULFATE 2.5; .5 MG/3ML; MG/3ML
3 SOLUTION RESPIRATORY (INHALATION) AS DIRECTED
COMMUNITY
Start: 2019-02-05

## 2019-03-13 RX ORDER — APIXABAN 5 MG/1
1 TABLET, FILM COATED ORAL DAILY
Status: ON HOLD | COMMUNITY
Start: 2019-02-18 | End: 2021-01-19 | Stop reason: SDUPTHER

## 2019-03-13 RX ORDER — ZINC SULFATE 50(220)MG
1 CAPSULE ORAL DAILY
COMMUNITY
Start: 2019-02-05 | End: 2020-08-17

## 2019-03-13 RX ORDER — COLESEVELAM 180 1/1
1 TABLET ORAL DAILY
Refills: 0 | COMMUNITY
Start: 2019-03-09 | End: 2020-08-17

## 2019-03-13 RX ORDER — LACTOBACILLUS ACIDOPHILUS
1 CAPSULE ORAL DAILY
COMMUNITY
Start: 2019-02-05 | End: 2020-08-17

## 2019-03-13 RX ORDER — ACETAMINOPHEN 160 MG/5ML
1 SUSPENSION, ORAL (FINAL DOSE FORM) ORAL DAILY
COMMUNITY
Start: 2019-02-05

## 2019-03-13 NOTE — PROGRESS NOTES
said all is going well. There are no needs from Dr Eliana Lawson at this call. NN will continue to monitor x 30 days for any readmissions. Medication reconciliation completed with him. .  Goals Addressed                 This Visit's Progress       Post Hospitalization     Knowledge and adherence of prescribed medication (ie. action, side effects, missed dose, etc.). On track     Prevent complications post hospitalization.    On track

## 2019-03-25 ENCOUNTER — TELEPHONE (OUTPATIENT)
Dept: FAMILY MEDICINE CLINIC | Age: 69
End: 2019-03-25

## 2019-03-25 NOTE — TELEPHONE ENCOUNTER
[address] nurse called for new orders to be signed for wound care. They want to change dressing types for her sacral decub. We are not managing her [facility] orders since pt not seen since 10/2018 due to the expense of stretcher transport. Pt will soon be seen by [facility]. Called today, they have not opened their April schedule yet. Dr. Nicki Rey phone  did the ostomy and should be the contact for wound care, home health at this time. Contact information given to nurse.

## 2019-03-27 ENCOUNTER — TELEPHONE (OUTPATIENT)
Dept: FAMILY MEDICINE CLINIC | Age: 69
End: 2019-03-27

## 2019-03-27 NOTE — TELEPHONE ENCOUNTER
Pt and spouse both called to say [facility] did call to get information to schedule them. I called back, leaving a message on spouse's cell that I received the message and will be happy to send over records that they need. Their office has not contacted us yet.

## 2019-05-16 ENCOUNTER — PATIENT OUTREACH (OUTPATIENT)
Dept: FAMILY MEDICINE CLINIC | Age: 69
End: 2019-05-16

## 2019-05-16 NOTE — PROGRESS NOTES
. 
Patient has graduated from the Transitions of Care Coordination  program on 5/16/2019 Patient will be following by [facility]. Patient's symptoms are stable at this time. Patient/family has the ability to self-manage. Care management goals have been completed at this time. No further nurse navigator follow up scheduled. Goals Addressed This Visit's Progress Post Hospitalization  COMPLETED: Knowledge and adherence of prescribed medication (ie. action, side effects, missed dose, etc.).  COMPLETED: Prevent complications post hospitalization. Pt has nurse navigator's contact information for any further questions, concerns, or needs. Patients upcoming visits:   
Future Appointments Date Time Provider Marcin June 6/27/2019  3:30 PM Tom Jain MD [address]

## 2020-01-27 NOTE — PROGRESS NOTES
Left msg on pt's home phone to call back to schedule appt for symptoms, labs were negative.
Pt was seen in the office for LEIF. Addressed.
Tell pt or  that c diff was neg. Needs appt for further eval diarrhea. Make sure she isn't taking any laxatives, like dulcolax.
ALTERATION IN BREATHING

## 2020-08-17 PROBLEM — Z22.322 MRSA (METHICILLIN RESISTANT STAPH AUREUS) CULTURE POSITIVE: Status: ACTIVE | Noted: 2020-05-20

## 2020-08-17 PROBLEM — T14.8XXA WOUND INFECTION: Status: ACTIVE | Noted: 2020-05-21

## 2020-08-17 PROBLEM — N17.9 AKI (ACUTE KIDNEY INJURY) (HCC): Status: ACTIVE | Noted: 2020-04-06

## 2020-08-17 PROBLEM — L08.9 WOUND INFECTION: Status: ACTIVE | Noted: 2020-05-21

## 2020-08-17 PROBLEM — D72.829 LEUKOCYTOSIS: Status: ACTIVE | Noted: 2020-04-06

## 2020-08-17 PROBLEM — R50.9 FEVER: Status: ACTIVE | Noted: 2019-05-02

## 2020-08-17 PROBLEM — N39.0 UTI (URINARY TRACT INFECTION): Status: ACTIVE | Noted: 2020-04-06

## 2020-08-17 PROBLEM — E87.5 HYPERKALEMIA: Status: ACTIVE | Noted: 2020-04-06

## 2020-08-17 PROBLEM — L98.421 SKIN ULCER OF SACRUM, LIMITED TO BREAKDOWN OF SKIN (HCC): Status: ACTIVE | Noted: 2019-05-02

## 2021-01-11 ENCOUNTER — ANESTHESIA EVENT (OUTPATIENT)
Dept: SURGERY | Age: 71
DRG: 660 | End: 2021-01-11
Payer: MEDICARE

## 2021-01-11 ENCOUNTER — APPOINTMENT (OUTPATIENT)
Dept: CT IMAGING | Age: 71
DRG: 660 | End: 2021-01-11
Attending: UROLOGY
Payer: MEDICARE

## 2021-01-11 ENCOUNTER — HOSPITAL ENCOUNTER (INPATIENT)
Age: 71
LOS: 4 days | Discharge: HOME | DRG: 660 | End: 2021-01-15
Attending: UROLOGY | Admitting: UROLOGY
Payer: MEDICARE

## 2021-01-11 PROBLEM — A49.9 URINARY TRACT BACTERIAL INFECTIONS: Status: ACTIVE | Noted: 2021-01-11

## 2021-01-11 PROBLEM — N39.0 URINARY TRACT BACTERIAL INFECTIONS: Status: ACTIVE | Noted: 2021-01-11

## 2021-01-11 LAB
ANION GAP SERPL CALC-SCNC: 3 MMOL/L (ref 3–18)
BUN SERPL-MCNC: 15 MG/DL (ref 7–18)
BUN/CREAT SERPL: 28 (ref 12–20)
CALCIUM SERPL-MCNC: 9.5 MG/DL (ref 8.5–10.1)
CHLORIDE SERPL-SCNC: 106 MMOL/L (ref 100–111)
CO2 SERPL-SCNC: 31 MMOL/L (ref 21–32)
CREAT SERPL-MCNC: 0.54 MG/DL (ref 0.6–1.3)
ERYTHROCYTE [DISTWIDTH] IN BLOOD BY AUTOMATED COUNT: 15.5 % (ref 11.6–14.5)
GLUCOSE SERPL-MCNC: 117 MG/DL (ref 74–99)
HCT VFR BLD AUTO: 38.1 % (ref 35–45)
HGB BLD-MCNC: 12.2 G/DL (ref 12–16)
MCH RBC QN AUTO: 30.1 PG (ref 24–34)
MCHC RBC AUTO-ENTMCNC: 32 G/DL (ref 31–37)
MCV RBC AUTO: 94.1 FL (ref 74–97)
PLATELET # BLD AUTO: 148 K/UL (ref 135–420)
PMV BLD AUTO: 9.4 FL (ref 9.2–11.8)
POTASSIUM SERPL-SCNC: 3.9 MMOL/L (ref 3.5–5.5)
RBC # BLD AUTO: 4.05 M/UL (ref 4.2–5.3)
SODIUM SERPL-SCNC: 140 MMOL/L (ref 136–145)
WBC # BLD AUTO: 6.5 K/UL (ref 4.6–13.2)

## 2021-01-11 PROCEDURE — 65270000029 HC RM PRIVATE

## 2021-01-11 PROCEDURE — 87086 URINE CULTURE/COLONY COUNT: CPT

## 2021-01-11 PROCEDURE — 36415 COLL VENOUS BLD VENIPUNCTURE: CPT

## 2021-01-11 PROCEDURE — 85027 COMPLETE CBC AUTOMATED: CPT

## 2021-01-11 PROCEDURE — 74011000250 HC RX REV CODE- 250: Performed by: INTERNAL MEDICINE

## 2021-01-11 PROCEDURE — 80048 BASIC METABOLIC PNL TOTAL CA: CPT

## 2021-01-11 PROCEDURE — 74176 CT ABD & PELVIS W/O CONTRAST: CPT

## 2021-01-11 PROCEDURE — 74011250636 HC RX REV CODE- 250/636: Performed by: INTERNAL MEDICINE

## 2021-01-11 PROCEDURE — 87077 CULTURE AEROBIC IDENTIFY: CPT

## 2021-01-11 PROCEDURE — 87186 SC STD MICRODIL/AGAR DIL: CPT

## 2021-01-11 PROCEDURE — 74011250637 HC RX REV CODE- 250/637: Performed by: UROLOGY

## 2021-01-11 PROCEDURE — 87635 SARS-COV-2 COVID-19 AMP PRB: CPT

## 2021-01-11 RX ORDER — HYDROCODONE BITARTRATE AND ACETAMINOPHEN 5; 325 MG/1; MG/1
1 TABLET ORAL
Status: DISCONTINUED | OUTPATIENT
Start: 2021-01-11 | End: 2021-01-12 | Stop reason: SDUPTHER

## 2021-01-11 RX ORDER — PREGABALIN 50 MG/1
100 CAPSULE ORAL
Status: DISCONTINUED | OUTPATIENT
Start: 2021-01-11 | End: 2021-01-12

## 2021-01-11 RX ORDER — GRANULES FOR ORAL 3 G/1
3 POWDER ORAL ONCE
Status: DISPENSED | OUTPATIENT
Start: 2021-01-11 | End: 2021-01-12

## 2021-01-11 RX ORDER — AMITRIPTYLINE HYDROCHLORIDE 10 MG/1
10 TABLET, FILM COATED ORAL
Status: DISCONTINUED | OUTPATIENT
Start: 2021-01-11 | End: 2021-01-15 | Stop reason: HOSPADM

## 2021-01-11 RX ADMIN — PREGABALIN 100 MG: 50 CAPSULE ORAL at 22:38

## 2021-01-11 RX ADMIN — AMITRIPTYLINE HYDROCHLORIDE 10 MG: 10 TABLET, FILM COATED ORAL at 22:38

## 2021-01-11 RX ADMIN — CEFTRIAXONE SODIUM 2 G: 2 INJECTION, POWDER, FOR SOLUTION INTRAMUSCULAR; INTRAVENOUS at 20:55

## 2021-01-11 NOTE — H&P
Urology H&P    Patient: Oliverio Savage MRN: [id number]  SSN: [id number]    YOB: 1950  Age: 72 y.o. Sex: female          Date of Encounter:  January 11, 2021    Assessment/Plan:     Assessment    1. Pre-Admit for Right PCNL on 1/12/21 for Staghorn Kidney Stones   Urine Culture last week ([facility]) - Klebsiella - sensitive to ceftriaxone    Urine Culture 11/8/20 > 100K Klebsiella pneumoniae, 50K Enterococcus faecium +VRE   CT A/P 4/6/2020 - numerous stones occupying R renal pelvis and UPJ location, partial staghorn stone of R mid/upper calyx   1/11/21 CT A/P and labs pending                               2. Neurogenic bladder 2/2 spinal stenosis & stroke (May 2018)              Managed with chronic indwelling Butler catheter, changed by home health Q4 weeks       3. Hx of recurrent UTI's              4. 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 Managed by ID     Plan:  Stat IV placement and labs  Please change butler now. Send urine culture off new catheter  NPO after midnight for Right PCNL. LR after midnight for hydration  BMP and CBC 1/13/21  Consult ID for antibiotics due to multiple resistance  Consider PICC line for IV antibiotics if prolonged abx needed  See Daily    I have seen and examined this patient independently, I reviewed pertinent labs and imaging, and I agree with the assessing provider's assessment and plan as outlined above, with ammendments as follows:        Carrie Jones MD  [facility]  Pager [phone]      LES Posey  [facility]: [phone]  Pager: [phone]    I am available M-F 8:00am- 5:00pm. After 5pm on weekdays and weekends, please call our on call provider at [phone]. Thank you. History of Present Illness:  Patient is a 72 y.o. female.  She presents as a pre-admit for IV antibiotics prior to planned right PCNL in the am. Patient has multiple kidney stones on the right and will have a PCNL 21. Past Medical History:   Diagnosis Date    Bacteriuria     Chronic pain     Fatty liver     Hypertension     Neurogenic bladder     Paraplegia (HCC)     Prediabetes     Urinary incontinence     Vitamin D deficiency        Past Surgical History:   Procedure Laterality Date    HX CATARACT REMOVAL      HX  SECTION      HX COLOSTOMY      HX DILATION AND CURETTAGE      BTL    HX TUBAL LIGATION         Social History     Tobacco Use    Smoking status: Never Smoker    Smokeless tobacco: Never Used   Substance Use Topics    Alcohol use: Yes     Comment: Socially    Drug use: No       Allergies   Allergen Reactions    Peanut Anaphylaxis    Zyvox [Linezolid] Swelling     Leg & lip swelling, numbness in tongue and abdomen.  Allopurinol Nausea and Vomiting    Norvasc [Amlodipine] Itching and Cough    Chicken Derived Rash    Neurontin [Gabapentin] Other (comments)     Shaking, headache    Prednisone Other (comments)     Headache    [place]Poultry] Rash       Family History   Problem Relation Age of Onset   [employer] Arthritis-rheumatoid Mother     Stroke Mother     Heart Disease Father     Diabetes Father     Hypertension Father     Diabetes Brother     Hypertension Brother     Kidney Disease Brother        Current Facility-Administered Medications   Medication Dose Route Frequency Provider Last Rate Last Admin    . Please Enter Patient's Height & Weight   1 Each Other DAILY Deuce Dozier MD        cefTRIAXone (ROCEPHIN) 2 g in sterile water (preservative free) 20 mL IV syringe  2 g IntraVENous Q24H Vivi Smalls MD        fosfomycin (MONUROL) oral packet 3 g  3 g Oral ONCE Dionne Montgomery MD        [START ON 2021] DAPTOmycin (CUBICIN) 500 mg in 0.9% sodium chloride 10 mL IV Syringe  500 mg IntraVENous Q24H Vivi Smalls MD             Review of Systems:  ROS is:    Negative for: Ophthalmologic issues, ENT issues, Cardiovascular issues, respiratory issues, GI issues, neurologic issues, hematoogic issues, skin lesions, musculoskeletal issues, psychiatric issues  Exceptions: N/A    Positive for:    None          PHYSICAL EXAMINATION:     Visit Vitals  /78   Temp 97.9 °F (36.6 °C)   Resp 12   Ht 5' 2\" (1.575 m)   Wt 74.8 kg (165 lb)   SpO2 98%   Breastfeeding No   BMI 30.18 kg/m²     Constitutional: Well developed, well-nourished female in no acute distress. HEENT: Normocephalic, Atraumatic   CV:   no edema   Respiratory: No respiratory distress or difficulties breathing   Abdomen:  Soft and nontender.  Female:  CVA tenderness: none. King in place with dark yellow urine. Skin:  Normal color. No evidence of jaundice. Neuro/Psych:  Patient with appropriate affect. Alert and oriented. REVIEW OF LABS AND IMAGING:         Labs: Results:   Chemistry  No results for input(s): GLU, NA, K, CL, CO2, BUN, CREA, CA, AGAP, BUCR, TBIL, AP, TP, ALB, GLOB, AGRAT in the last 72 hours. No lab exists for component: GPT   CBC w/Diff No results for input(s): WBC, RBC, HGB, HCT, PLT, GRANS, LYMPH, EOS, HGBEXT, HCTEXT, PLTEXT in the last 72 hours. Cultures No results for input(s): CULT in the last 72 hours.   All Micro Results     Procedure Component Value Units Date/Time    CULTURE, URINE [[id number]]     Order Status: Sent Specimen: Cath Urine             Urinalysis Color   Date Value Ref Range Status   07/16/2018 YELLOW/STRAW   Final     Comment:     Color Reference Range: Straw, Yellow or Dark Yellow     Appearance   Date Value Ref Range Status   07/16/2018 CLOUDY (A) CLEAR   Final     Specific gravity   Date Value Ref Range Status   07/16/2018 1.011 1.003 - 1.030   Final     pH (UA)   Date Value Ref Range Status   07/16/2018 7.5 5.0 - 8.0   Final     Protein   Date Value Ref Range Status   07/16/2018 NEGATIVE  NEG mg/dL Final     Ketone   Date Value Ref Range Status   07/16/2018 NEGATIVE  NEG mg/dL Final     Bilirubin Date Value Ref Range Status   07/16/2018 NEGATIVE  NEG   Final     Blood   Date Value Ref Range Status   07/16/2018 NEGATIVE  NEG   Final     Urobilinogen   Date Value Ref Range Status   07/16/2018 0.2 0.2 - 1.0 EU/dL Final     Nitrites   Date Value Ref Range Status   07/16/2018 NEGATIVE  NEG   Final     Leukocyte Esterase   Date Value Ref Range Status   07/16/2018 SMALL (A) NEG   Final     Potassium   Date Value Ref Range Status   07/16/2018 4.4 3.5 - 5.1 mmol/L Final     Creatinine   Date Value Ref Range Status   07/16/2018 0.61 0.55 - 1.02 MG/DL Final     BUN   Date Value Ref Range Status   07/16/2018 12 6 - 20 MG/DL Final      Coagulation No results found for: PTP, INR, APTT, INREXT

## 2021-01-12 ENCOUNTER — ANESTHESIA (OUTPATIENT)
Dept: SURGERY | Age: 71
DRG: 660 | End: 2021-01-12
Payer: MEDICARE

## 2021-01-12 ENCOUNTER — APPOINTMENT (OUTPATIENT)
Dept: GENERAL RADIOLOGY | Age: 71
DRG: 660 | End: 2021-01-12
Attending: UROLOGY
Payer: MEDICARE

## 2021-01-12 PROBLEM — N20.0 KIDNEY STONE: Status: ACTIVE | Noted: 2021-01-12

## 2021-01-12 LAB
ABO + RH BLD: NORMAL
BLOOD GROUP ANTIBODIES SERPL: NORMAL
COVID-19 RAPID TEST, COVR: NOT DETECTED
SOURCE, COVRS: NORMAL
SPECIMEN EXP DATE BLD: NORMAL
SPECIMEN TYPE, XMCV1T: NORMAL

## 2021-01-12 PROCEDURE — C2617 STENT, NON-COR, TEM W/O DEL: HCPCS | Performed by: UROLOGY

## 2021-01-12 PROCEDURE — 74011250637 HC RX REV CODE- 250/637: Performed by: UROLOGY

## 2021-01-12 PROCEDURE — 74011000250 HC RX REV CODE- 250: Performed by: ANESTHESIOLOGY

## 2021-01-12 PROCEDURE — 65270000029 HC RM PRIVATE

## 2021-01-12 PROCEDURE — 74011000636 HC RX REV CODE- 636: Performed by: UROLOGY

## 2021-01-12 PROCEDURE — 86850 RBC ANTIBODY SCREEN: CPT

## 2021-01-12 PROCEDURE — 77030019927 HC TBNG IRR CYSTO BAXT -A: Performed by: UROLOGY

## 2021-01-12 PROCEDURE — 74011000258 HC RX REV CODE- 258: Performed by: ANESTHESIOLOGY

## 2021-01-12 PROCEDURE — 76060000032 HC ANESTHESIA 0.5 TO 1 HR: Performed by: UROLOGY

## 2021-01-12 PROCEDURE — 74011250636 HC RX REV CODE- 250/636: Performed by: UROLOGY

## 2021-01-12 PROCEDURE — 74011250636 HC RX REV CODE- 250/636: Performed by: INTERNAL MEDICINE

## 2021-01-12 PROCEDURE — 87086 URINE CULTURE/COLONY COUNT: CPT

## 2021-01-12 PROCEDURE — 2709999900 HC NON-CHARGEABLE SUPPLY

## 2021-01-12 PROCEDURE — 76010000160 HC OR TIME 0.5 TO 1 HR INTENSV-TIER 1: Performed by: UROLOGY

## 2021-01-12 PROCEDURE — 74011250636 HC RX REV CODE- 250/636: Performed by: ANESTHESIOLOGY

## 2021-01-12 PROCEDURE — 0T768DZ DILATION OF RIGHT URETER WITH INTRALUMINAL DEVICE, VIA NATURAL OR ARTIFICIAL OPENING ENDOSCOPIC: ICD-10-PCS | Performed by: UROLOGY

## 2021-01-12 PROCEDURE — 00910 ANES TRANSURETHRAL PX NOS: CPT | Performed by: ANESTHESIOLOGY

## 2021-01-12 PROCEDURE — 74420 UROGRAPHY RTRGR +-KUB: CPT

## 2021-01-12 PROCEDURE — 74011000250 HC RX REV CODE- 250: Performed by: INTERNAL MEDICINE

## 2021-01-12 PROCEDURE — C1769 GUIDE WIRE: HCPCS | Performed by: UROLOGY

## 2021-01-12 PROCEDURE — 74011250637 HC RX REV CODE- 250/637: Performed by: NURSE ANESTHETIST, CERTIFIED REGISTERED

## 2021-01-12 PROCEDURE — 2709999900 HC NON-CHARGEABLE SUPPLY: Performed by: UROLOGY

## 2021-01-12 PROCEDURE — 74011250636 HC RX REV CODE- 250/636: Performed by: NURSE ANESTHETIST, CERTIFIED REGISTERED

## 2021-01-12 PROCEDURE — BT1D1ZZ FLUOROSCOPY OF RIGHT KIDNEY, URETER AND BLADDER USING LOW OSMOLAR CONTRAST: ICD-10-PCS | Performed by: UROLOGY

## 2021-01-12 PROCEDURE — 76210000063 HC OR PH I REC FIRST 0.5 HR: Performed by: UROLOGY

## 2021-01-12 DEVICE — URETERAL STENT SET
Type: IMPLANTABLE DEVICE | Site: URETER | Status: FUNCTIONAL
Brand: PERCUFLEX™ PLUS

## 2021-01-12 RX ORDER — SODIUM CHLORIDE 0.9 % (FLUSH) 0.9 %
5-40 SYRINGE (ML) INJECTION EVERY 8 HOURS
Status: DISCONTINUED | OUTPATIENT
Start: 2021-01-12 | End: 2021-01-15 | Stop reason: HOSPADM

## 2021-01-12 RX ORDER — ONDANSETRON 2 MG/ML
4 INJECTION INTRAMUSCULAR; INTRAVENOUS
Status: DISCONTINUED | OUTPATIENT
Start: 2021-01-12 | End: 2021-01-15 | Stop reason: HOSPADM

## 2021-01-12 RX ORDER — ONDANSETRON 2 MG/ML
4 INJECTION INTRAMUSCULAR; INTRAVENOUS ONCE
Status: ACTIVE | OUTPATIENT
Start: 2021-01-12 | End: 2021-01-12

## 2021-01-12 RX ORDER — SODIUM CHLORIDE 0.9 % (FLUSH) 0.9 %
5-40 SYRINGE (ML) INJECTION AS NEEDED
Status: DISCONTINUED | OUTPATIENT
Start: 2021-01-12 | End: 2021-01-15 | Stop reason: HOSPADM

## 2021-01-12 RX ORDER — SODIUM CHLORIDE 0.9 % (FLUSH) 0.9 %
5-40 SYRINGE (ML) INJECTION AS NEEDED
Status: DISCONTINUED | OUTPATIENT
Start: 2021-01-12 | End: 2021-01-12 | Stop reason: HOSPADM

## 2021-01-12 RX ORDER — ONDANSETRON 2 MG/ML
INJECTION INTRAMUSCULAR; INTRAVENOUS AS NEEDED
Status: DISCONTINUED | OUTPATIENT
Start: 2021-01-12 | End: 2021-01-12 | Stop reason: HOSPADM

## 2021-01-12 RX ORDER — SODIUM CHLORIDE, SODIUM LACTATE, POTASSIUM CHLORIDE, CALCIUM CHLORIDE 600; 310; 30; 20 MG/100ML; MG/100ML; MG/100ML; MG/100ML
25 INJECTION, SOLUTION INTRAVENOUS CONTINUOUS
Status: DISCONTINUED | OUTPATIENT
Start: 2021-01-12 | End: 2021-01-12 | Stop reason: HOSPADM

## 2021-01-12 RX ORDER — PREGABALIN 50 MG/1
100 CAPSULE ORAL 3 TIMES DAILY
Status: DISCONTINUED | OUTPATIENT
Start: 2021-01-12 | End: 2021-01-15 | Stop reason: HOSPADM

## 2021-01-12 RX ORDER — DEXAMETHASONE SODIUM PHOSPHATE 4 MG/ML
INJECTION, SOLUTION INTRA-ARTICULAR; INTRALESIONAL; INTRAMUSCULAR; INTRAVENOUS; SOFT TISSUE AS NEEDED
Status: DISCONTINUED | OUTPATIENT
Start: 2021-01-12 | End: 2021-01-12 | Stop reason: HOSPADM

## 2021-01-12 RX ORDER — NALOXONE HYDROCHLORIDE 0.4 MG/ML
0.4 INJECTION, SOLUTION INTRAMUSCULAR; INTRAVENOUS; SUBCUTANEOUS AS NEEDED
Status: DISCONTINUED | OUTPATIENT
Start: 2021-01-12 | End: 2021-01-15 | Stop reason: HOSPADM

## 2021-01-12 RX ORDER — MIDAZOLAM HYDROCHLORIDE 1 MG/ML
INJECTION, SOLUTION INTRAMUSCULAR; INTRAVENOUS AS NEEDED
Status: DISCONTINUED | OUTPATIENT
Start: 2021-01-12 | End: 2021-01-12 | Stop reason: HOSPADM

## 2021-01-12 RX ORDER — HYDROMORPHONE HYDROCHLORIDE 1 MG/ML
1 INJECTION, SOLUTION INTRAMUSCULAR; INTRAVENOUS; SUBCUTANEOUS
Status: DISCONTINUED | OUTPATIENT
Start: 2021-01-12 | End: 2021-01-15 | Stop reason: HOSPADM

## 2021-01-12 RX ORDER — PROPOFOL 10 MG/ML
INJECTION, EMULSION INTRAVENOUS AS NEEDED
Status: DISCONTINUED | OUTPATIENT
Start: 2021-01-12 | End: 2021-01-12 | Stop reason: HOSPADM

## 2021-01-12 RX ORDER — LIDOCAINE HYDROCHLORIDE 10 MG/ML
0.1 INJECTION, SOLUTION EPIDURAL; INFILTRATION; INTRACAUDAL; PERINEURAL AS NEEDED
Status: DISCONTINUED | OUTPATIENT
Start: 2021-01-12 | End: 2021-01-12 | Stop reason: HOSPADM

## 2021-01-12 RX ORDER — GENTAMICIN SULFATE 80 MG/100ML
INJECTION, SOLUTION INTRAVENOUS AS NEEDED
Status: DISCONTINUED | OUTPATIENT
Start: 2021-01-12 | End: 2021-01-12 | Stop reason: HOSPADM

## 2021-01-12 RX ORDER — SODIUM CHLORIDE 9 MG/ML
125 INJECTION, SOLUTION INTRAVENOUS CONTINUOUS
Status: DISCONTINUED | OUTPATIENT
Start: 2021-01-12 | End: 2021-01-15 | Stop reason: HOSPADM

## 2021-01-12 RX ORDER — DOCUSATE SODIUM 100 MG/1
100 CAPSULE, LIQUID FILLED ORAL 2 TIMES DAILY
Status: DISCONTINUED | OUTPATIENT
Start: 2021-01-12 | End: 2021-01-15 | Stop reason: HOSPADM

## 2021-01-12 RX ORDER — FENTANYL CITRATE 50 UG/ML
INJECTION, SOLUTION INTRAMUSCULAR; INTRAVENOUS AS NEEDED
Status: DISCONTINUED | OUTPATIENT
Start: 2021-01-12 | End: 2021-01-12 | Stop reason: HOSPADM

## 2021-01-12 RX ORDER — LIDOCAINE HYDROCHLORIDE 20 MG/ML
INJECTION, SOLUTION EPIDURAL; INFILTRATION; INTRACAUDAL; PERINEURAL AS NEEDED
Status: DISCONTINUED | OUTPATIENT
Start: 2021-01-12 | End: 2021-01-12 | Stop reason: HOSPADM

## 2021-01-12 RX ORDER — SODIUM CHLORIDE, SODIUM LACTATE, POTASSIUM CHLORIDE, CALCIUM CHLORIDE 600; 310; 30; 20 MG/100ML; MG/100ML; MG/100ML; MG/100ML
INJECTION, SOLUTION INTRAVENOUS
Status: DISCONTINUED | OUTPATIENT
Start: 2021-01-12 | End: 2021-01-12 | Stop reason: HOSPADM

## 2021-01-12 RX ORDER — OXYCODONE HYDROCHLORIDE 5 MG/1
5 TABLET ORAL
Status: DISCONTINUED | OUTPATIENT
Start: 2021-01-12 | End: 2021-01-15 | Stop reason: HOSPADM

## 2021-01-12 RX ORDER — FAMOTIDINE 20 MG/1
20 TABLET, FILM COATED ORAL ONCE
Status: COMPLETED | OUTPATIENT
Start: 2021-01-12 | End: 2021-01-12

## 2021-01-12 RX ORDER — ACETAMINOPHEN 500 MG
1000 TABLET ORAL EVERY 6 HOURS
Status: DISCONTINUED | OUTPATIENT
Start: 2021-01-12 | End: 2021-01-15 | Stop reason: HOSPADM

## 2021-01-12 RX ORDER — SODIUM CHLORIDE 0.9 % (FLUSH) 0.9 %
5-40 SYRINGE (ML) INJECTION EVERY 8 HOURS
Status: DISCONTINUED | OUTPATIENT
Start: 2021-01-12 | End: 2021-01-12 | Stop reason: HOSPADM

## 2021-01-12 RX ORDER — DIPHENHYDRAMINE HYDROCHLORIDE 50 MG/ML
12.5 INJECTION, SOLUTION INTRAMUSCULAR; INTRAVENOUS
Status: DISCONTINUED | OUTPATIENT
Start: 2021-01-12 | End: 2021-01-15 | Stop reason: HOSPADM

## 2021-01-12 RX ORDER — HYDROMORPHONE HYDROCHLORIDE 2 MG/ML
0.5 INJECTION, SOLUTION INTRAMUSCULAR; INTRAVENOUS; SUBCUTANEOUS ONCE
Status: DISCONTINUED | OUTPATIENT
Start: 2021-01-12 | End: 2021-01-12 | Stop reason: HOSPADM

## 2021-01-12 RX ADMIN — GENTAMICIN SULFATE 80 MG: 80 INJECTION, SOLUTION INTRAVENOUS at 07:36

## 2021-01-12 RX ADMIN — ACETAMINOPHEN 1000 MG: 325 TABLET ORAL at 18:24

## 2021-01-12 RX ADMIN — OXYCODONE HYDROCHLORIDE 5 MG: 5 TABLET ORAL at 10:38

## 2021-01-12 RX ADMIN — FENTANYL CITRATE 50 MCG: 50 INJECTION, SOLUTION INTRAMUSCULAR; INTRAVENOUS at 07:59

## 2021-01-12 RX ADMIN — SODIUM CHLORIDE 125 ML/HR: 900 INJECTION, SOLUTION INTRAVENOUS at 10:37

## 2021-01-12 RX ADMIN — PREGABALIN 100 MG: 50 CAPSULE ORAL at 10:38

## 2021-01-12 RX ADMIN — CEFTRIAXONE SODIUM 2 G: 2 INJECTION, POWDER, FOR SOLUTION INTRAMUSCULAR; INTRAVENOUS at 14:29

## 2021-01-12 RX ADMIN — SODIUM CHLORIDE, POTASSIUM CHLORIDE, SODIUM LACTATE AND CALCIUM CHLORIDE 25 ML/HR: 600; 310; 30; 20 INJECTION, SOLUTION INTRAVENOUS at 07:00

## 2021-01-12 RX ADMIN — AMITRIPTYLINE HYDROCHLORIDE 10 MG: 10 TABLET, FILM COATED ORAL at 21:00

## 2021-01-12 RX ADMIN — ONDANSETRON 4 MG: 2 INJECTION INTRAMUSCULAR; INTRAVENOUS at 07:36

## 2021-01-12 RX ADMIN — FAMOTIDINE 20 MG: 20 TABLET, FILM COATED ORAL at 06:59

## 2021-01-12 RX ADMIN — DOCUSATE SODIUM 100 MG: 100 CAPSULE, LIQUID FILLED ORAL at 10:38

## 2021-01-12 RX ADMIN — PREGABALIN 100 MG: 50 CAPSULE ORAL at 21:00

## 2021-01-12 RX ADMIN — PROPOFOL 150 MG: 10 INJECTION, EMULSION INTRAVENOUS at 07:36

## 2021-01-12 RX ADMIN — SODIUM CHLORIDE 500 MG: 9 INJECTION, SOLUTION INTRAMUSCULAR; INTRAVENOUS; SUBCUTANEOUS at 04:47

## 2021-01-12 RX ADMIN — SODIUM CHLORIDE 125 ML/HR: 900 INJECTION, SOLUTION INTRAVENOUS at 20:55

## 2021-01-12 RX ADMIN — MIDAZOLAM HYDROCHLORIDE 2 MG: 2 INJECTION, SOLUTION INTRAMUSCULAR; INTRAVENOUS at 07:36

## 2021-01-12 RX ADMIN — SODIUM CHLORIDE, SODIUM LACTATE, POTASSIUM CHLORIDE, AND CALCIUM CHLORIDE: 600; 310; 30; 20 INJECTION, SOLUTION INTRAVENOUS at 07:21

## 2021-01-12 RX ADMIN — LIDOCAINE HYDROCHLORIDE 60 MG: 20 INJECTION, SOLUTION EPIDURAL; INFILTRATION; INTRACAUDAL; PERINEURAL at 07:36

## 2021-01-12 RX ADMIN — DEXAMETHASONE SODIUM PHOSPHATE 4 MG: 4 INJECTION, SOLUTION INTRAMUSCULAR; INTRAVENOUS at 08:07

## 2021-01-12 RX ADMIN — Medication 5 ML: at 21:00

## 2021-01-12 RX ADMIN — DOCUSATE SODIUM 100 MG: 100 CAPSULE, LIQUID FILLED ORAL at 18:23

## 2021-01-12 RX ADMIN — OXYCODONE HYDROCHLORIDE 5 MG: 5 TABLET ORAL at 18:23

## 2021-01-12 RX ADMIN — ACETAMINOPHEN 1000 MG: 325 TABLET ORAL at 12:36

## 2021-01-12 RX ADMIN — PREGABALIN 100 MG: 50 CAPSULE ORAL at 16:24

## 2021-01-12 RX ADMIN — AMPICILLIN 2 G: 2 INJECTION, POWDER, FOR SOLUTION INTRAVENOUS at 07:36

## 2021-01-12 RX ADMIN — FENTANYL CITRATE 50 MCG: 50 INJECTION, SOLUTION INTRAMUSCULAR; INTRAVENOUS at 07:36

## 2021-01-12 RX ADMIN — Medication 10 ML: at 14:43

## 2021-01-12 NOTE — PROGRESS NOTES
Progress Note      Patient: Babita Babcock MRN: [id number]  SSN: [id number]    YOB: 1950  Age: 72 y.o. Sex: female      Admit Date: 1/11/2021    LOS: 1 day     Assessment:   #1 Obstructing proximal right ureteral stones and additional right infectious nephrolithiasis   #2 Neurogenic bladder with chronic butler     New CT with obstructing ureteral stones, hydro and likely UTI based on last culture partially treated  Unsafe for PCNL  Proceed to cysto/stent placement  Will wait for all cultures to finalize and plan for PCNL next week Tuesday 1/19 first case AM     Signed By: Danni Rodriguez MD     January 12, 2021      PAGER:  [phone]  OFFICE:  [address] [place] [address]   [phone]    Subjective:     No events. Feels well. Objective:     Vitals:    01/11/21 1355 01/12/21 0009 01/12/21 0410 01/12/21 0620   BP:  127/83 (!) 135/96 (!) 150/91   Pulse:  98 91 88   Resp:  19 16 16   Temp:  97.4 °F (36.3 °C) 97.9 °F (36.6 °C) 98.1 °F (36.7 °C)   SpO2:  96% 99% 98%   Weight: 165 lb (74.8 kg)      Height: 5' 2\" (1.575 m)           Intake and Output:  Current Shift: No intake/output data recorded.   Last three shifts: 01/10 1901 - 01/12 0700  In: 240 [P.O.:240]  Out: 950 [Urine:950]    Current Facility-Administered Medications   Medication Dose Route Frequency    lactated Ringers infusion  25 mL/hr IntraVENous CONTINUOUS    sodium chloride (NS) flush 5-40 mL  5-40 mL IntraVENous Q8H    sodium chloride (NS) flush 5-40 mL  5-40 mL IntraVENous PRN    lidocaine (PF) (XYLOCAINE) 10 mg/mL (1 %) injection 0.1 mL  0.1 mL SubCUTAneous PRN    ampicillin (OMNIPEN) 2 g in 0.9% sodium chloride (MBP/ADV) 100 mL MBP  2 g IntraVENous ONCE    gentamicin (GARAMYCIN) 300 mg in 0.9% sodium chloride 100 mL IVPB  5 mg/kg (Adjusted) IntraVENous ONCE    cefTRIAXone (ROCEPHIN) 2 g in sterile water (preservative free) 20 mL IV syringe  2 g IntraVENous Q24H    DAPTOmycin (CUBICIN) 500 mg in 0.9% sodium chloride 10 mL IV Syringe  500 mg IntraVENous Q24H    amitriptyline (ELAVIL) tablet 10 mg  10 mg Oral QHS    HYDROcodone-acetaminophen (NORCO) 5-325 mg per tablet 1 Tab  1 Tab Oral Q6H PRN    pregabalin (LYRICA) capsule 100 mg  100 mg Oral QHS         Physical Exam:   GENERAL: alert, cooperative, no distress, appears stated age  LUNG: unlabored breathing  ABDOMEN: soft, non-tender. No masses,  no organomegaly  EXTREMITIES:  extremities normal, atraumatic, no cyanosis or edema  SKIN: no jaundice  : no CVA tenderness. King draining cloudy urine. Lab/Data Review:  BMP:   Lab Results   Component Value Date/Time     01/11/2021 05:20 PM    K 3.9 01/11/2021 05:20 PM     01/11/2021 05:20 PM    CO2 31 01/11/2021 05:20 PM    AGAP 3 01/11/2021 05:20 PM     (H) 01/11/2021 05:20 PM    BUN 15 01/11/2021 05:20 PM    CREA 0.54 (L) 01/11/2021 05:20 PM    GFRAA >60 01/11/2021 05:20 PM    GFRNA >60 01/11/2021 05:20 PM     CBC:   Lab Results   Component Value Date/Time    WBC 6.5 01/11/2021 05:20 PM    HGB 12.2 01/11/2021 05:20 PM    HCT 38.1 01/11/2021 05:20 PM     01/11/2021 05:20 PM     COAGS: No results found for: APTT, PTP, INR, INREXT, INREXT       CT Results:  Results from Hospital Encounter encounter on 01/11/21   CT ABD PELV WO CONT    Narrative EXAM:  CT Abdomen-Pelvis without Contrast.    CLINICAL INDICATION:  Stone disease. Renal calculi. COMPARISON:  None. TECHNIQUE:      - Helical volumetric CT imaging of the abdomen and pelvis is performed without  IV contrast administration.   Coronal and sagittal multiplanar reconstruction  images are generated for improved anatomic delineation.  - All CT scans at this facility are performed using dose optimization technique  as appropriate to the performed exam, to include automated exposure control,  adjustment of the mA and/or kV according to patient's size (Including  appropriate matching for site-specific examinations), or use of iterative  reconstruction technique. FINDINGS:    Lung Bases:  No airspace opacities. Liver:  Large hepatic cystic lesion, 19.0 x 15.0 x 19.0 cm. Favor large hepatic  cyst.  Right lobe of the liver with 1.4 x 0.9 cm (axial #39). Gallbladder:  Multiple gallstones. Pancreas, Spleen, Adrenal Glands:  Unremarkable. Kidneys-Ureters-Bladder:  Multiple renal stones in the right kidney extending to  the right renal pelvis and proximal ureter, measuring up to about 1.5 cm. No  convincing evidence of distal ureteral stones. No evidence of stone or  postobstructive changes in the left kidney and ureter. Status post King  catheter placement into the bladder. Stones seen adjacent to the King catheter  measuring up to about 1.0 cm. Gastrointestinal Tract:      - Unremarkable stomach. - No acute findings along the small bowel. No small bowel obstruction.    - Normal appendix. - S/P colostomy in the left lower quadrant.   - No acute diverticulitis or colitis. Nodes:  No mesenteric or retroperitoneal adenopathy. Vascular:  Non-aneurysmal aorta. Uterus:  Unremarkable. Rectum:  Unremarkable. Pelvic Sidewall:  No adenopathy. No free fluid. Bones:  No acute osseous findings. Focal skin thickening at the posterior  midline (axial #77). Miscellaneous:  6.2 x 6.5 cm poorly circumscribed hyperdense collection (axial  #108). Impression IMPRESSION:    1. Large hepatic cyst.  2.  Cholelithiasis. 3.  Right renal pelvic stones extending into the proximal ureter. 4.  Hepatic cyst.  5.  Right proximal thigh intramuscular/ intermuscular hematoma. 6.  Small decubitus ulceration at the midline intergluteal region. US Results:  Results from [place] encounter on 06/07/18   US RETROPERITONEUM COMP    Narrative INDICATION: Neurogenic bladder. Exam: Bilateral renal ultrasound. FINDINGS: The right kidney measures 10.2 cm in sagittal length.  The left kidney  measures 10.3 cm in sagittal length. There is no hydronephrosis. No renal cyst,  calculus or mass is visualized. The abdominal aorta, aortic bifurcation and IVC  are not well visualized due to patient body habitus. The urinary bladder is  partially filled and grossly normal.    Incidental note is made of a 21.3 cm x 19.0 cm x 16.2 cm hepatic cyst. There is  a possible additional hepatic cysts, incompletely visualized and measuring 6.9  cm x 2.9 cm x 2.2 cm. Impression IMPRESSION:   1. No hydronephrosis. 2. Incompletely visualized hepatic cysts. CT can be performed for further  evaluation, if indicated.                                    Active Problems:    Urinary tract bacterial infections (1/11/2021)      Kidney stone (1/12/2021)

## 2021-01-12 NOTE — OP NOTES
Operative Note  Patient: Daron Childress               Sex: female             MRN: [id number]  YOB: 1950      Age:  72 y.o. Preoperative Diagnosis: Kidney stone [N20.0]  Postoperative Diagnosis:  Kidney stone [N20.0]  Surgeon: Myranda Chase     Indication: This is a 73 y/o woman, spinal cord injury, neurogenic bladder with multiple initially thought to be intrarenal stones on CT in 4/2020 here today for PCNL, found to have on repeat CT preop proximal ureteral steinstrasse approximately 4cm long with ?possible untreated UTI. ? partially treated. Plan was changed to proceed with cystoscopy and stent placement. Procedure:    1) Cystoscopy  2) Retrograde pyelogram with interpretation  3) Ureteral stent placement     Findings:    1). Mild urethral erosion from butler catheter. Bladder was erythematous consistent with UTI. Bladder culture sent. 2). Retrograde pyelogram revealed 5cm proximal ureteral steinstrasse and proximal hydronephrosis, Culture sent from above the stone   3). 7x26 JJ Ureteral stent placed without complication     Narrative of Events: The patient was brought to the operative suite. Anesthesia was induced and preoperative antibiotics were administered. They were then placed in the dorsal lithotomy position and their external genitalia was prepped and draped in the usual fashion. A surgical timeout was performed confirming the patient's name, date of birth, laterality, and antibiotics. All were in agreement. A 22 Micron[place] cystourethroscope was then inserted into the patients bladder. Diffuse erythema was noted. Culture was sent from the bladder. Thorough cystoscopy was performed with both the 30 degree and 70 degree lens which revealed no other findings. The affected side was identified and ureteral orifice was cannulated with a  0.035 angled glide wire. The wire was negotiated past the stones and steinstrasse and confirmed in the renal pelvis.   A ureteral catheter was advanced over the wire and retrograde pyelogram was performed confirming appropriate location. Culture was sent from above the stone. A ureteral stent was then advanced over the wire and deployed in the standard fashion with an excellent curl in the bladder and renal pelvis. A new butler catheter was placed. Estimated Blood Loss: <5CC     Anesthesia:  General                  Implants:   Implant Name Type Inv. Item Serial No.  Lot No. LRB No. Used Action   SET STENT URTRL 7FR MARC 26CML PRCFLX PLS DBLE PGTL STRGHT TI - [id number]  SET STENT URTRL 7FR MARC 26CML PRCFLX PLS DBLE PGTL STRGHT TI  [employer]WD [id number] Right 1 Implanted       Specimens:   ID Type Source Tests Collected by Time Destination   1 : bladder urine culture Urine Bladder CULTURE, URINE Can Vuong MD 1/12/2021  7:58 AM Microbiology   2 : kidney urine culture Urine Kidney CULTURE, URINE Can Vuong MD 1/12/2021  8:01 AM Microbiology        Drains: 0E88 JJ stent, 16 silicone catheter            Complications:  None           Plan:  1. Continue Ceftriaxone IV, wait for cultures   2.  Finalize cultures and plan for PCNL next Tuesday 1/19, first case AM    Emanuel Montiel MD  1/12/2021

## 2021-01-12 NOTE — ROUTINE PROCESS
1/12/2021  12:24 AM    TRANSFER - OUT REPORT:    Verbal report given to ARACELI Lozano(name) on Minerva Coreas  being transferred to [address] [place] [address](unit) for routine progression of care       Report consisted of patients Situation, Background, Assessment and   Recommendations(SBAR). Information from the following report(s) SBAR, Kardex, Procedure Summary, Intake/Output, MAR, Accordion, Recent Results and Med Rec Status was reviewed with the receiving nurse. Lines:   Peripheral IV 01/11/21 Left Forearm (Active)   Site Assessment Clean, dry, & intact 01/11/21 2035   Phlebitis Assessment 0 01/11/21 2035   Infiltration Assessment 0 01/11/21 2035   Dressing Status Clean, dry, & intact 01/11/21 2035   Dressing Type Tape;Transparent 01/11/21 2035   Hub Color/Line Status Blue;Capped 01/11/21 2035        Opportunity for questions and clarification was provided.       Patient transported with:   Registered Nurse

## 2021-01-12 NOTE — CONSULTS
Infectious Disease Consultation Note        Reason: Complicated UTI, cystitis-history of vancomycin-resistant Enterococcus UTI, Klebsiella UTI    Current abx Prior abx   Ceftriaxone since   daptomycin since  Fosfomycin on   Meropenem on      Lines:       Assessment :      73 y/o woman, spinal cord injury, neurogenic bladder with multiple initially thought to be intrarenal stones on CT in 2020 admitted to [address] [place] [address] on 2021 for PCNL. Urine culture 2020-Aerococcus, yeast  Urine culture 2020-Klebsiella pneumoniae, gram-positive cocci  Urine culture 2020-Klebsiella, VRE  Urine culture 2021-50,000 colonies of Klebsiella, mixed culture  Urine culture 2020-greater than 100,000 colonies of gram-negative nabor    Now in need for urological intervention    Clinical presentation consistent with complicated cystitis secondary to Klebsiella pneumoniae, ? Partially treated vancomycin-resistant Enterococcus    Status post cystoscopy, stent placement on 2021. Significant bladder inflammation noted intraoperatively consistent with cystitis    Plans for further urological intervention noted. Will need to give effective antibiotics to eradicate bacteriuria prior to urological procedure. Antibiotic management complicated due to history of linezolid allergy. Patient states that she developed numbness of her whole body when she took linezolid in 2020. She refuses to take trial of linezolid since she felt that she almost  when she took it. Recommendations:    1. Recommend ceftriaxone, daptomycin  2. Follow-up preop, Intra-Op urine cultures and modify antibiotics as needed  3. Further antibiotic recommendations based on the culture results    Thank you for consultation request. Above plan was discussed in details with patient,  and dr Sidra Ruelas. Please call me if any further questions or concerns. Will continue to participate in the care of this patient.   HPI:    72 y/o woman, spinal cord injury, neurogenic bladder with multiple initially thought to be intrarenal stones on CT in 2020 admitted to [facility] on 2021 for PCNL. Patient was admitted yesterday for PCNL. found to have on repeat CT preop revealed right renal pelvic stone extending into the proximal ureter with suspicion for partially treated urinary tract infection. Patient is on multiple urine cultures positive for Klebsiella. Also has had urine cultures in the past positive for VRE. Patient has history of allergy to linezolid. I was consulted for further recommendations. Patient underwent cystoscopy with ureteral stent placement on 2021. Intraoperatively noted to have mild urethral erosion from King catheter. Bladder was erythematous consistent with urinary tract infection. I was consulted for perioperative antibiotics. I recommended fosfomycin on . Patient's antibiotics were switched to ceftriaxone. I ordered daptomycin today a.m. preop. I am seeing the patient for further recommendations. Patient denies any fever or chills throughout this time. She took [address] for 7 days recently which completed on 1/10/2021. Past Medical History:   Diagnosis Date    Bacteriuria     Chronic pain     Fatty liver     Hypertension     Neurogenic bladder     Paraplegia (HCC)     Prediabetes     Urinary incontinence     Vitamin D deficiency        Past Surgical History:   Procedure Laterality Date    HX CATARACT REMOVAL      HX  SECTION      HX COLOSTOMY      HX DILATION AND CURETTAGE      BTL    HX TUBAL LIGATION         home Medication List    Details   HYDROcodone-acetaminophen (NORCO) 5-325 mg per tablet Take  by mouth.      pregabalin (LYRICA) 100 mg capsule Take 1 Cap by mouth nightly. Max Daily Amount: 100 mg.   Qty: 30 Cap, Refills: 2    Associated Diagnoses: Radicular pain of right lower back      acetaminophen (TYLENOL) 650 mg/20.3 mL solution Take  by mouth every six (6) hours as needed for Fever.      ondansetron (ZOFRAN ODT) 4 mg disintegrating tablet Take 1 Tab by mouth every eight (8) hours as needed for Nausea.  Qty: 90 Tab, Refills: 0      amitriptyline (ELAVIL) 10 mg tablet Take 1 Tab by mouth nightly.  Qty: 90 Tab, Refills: 3      carboxymethylcellulose sodium (REFRESH TEARS) 0.5 % drop ophthalmic solution Administer 1 Drop to both eyes two (2) times a day.  Qty: 30 mL, Refills: 11      loratadine (CLARITIN) 10 mg tablet Take 1 Tab by mouth daily as needed for Allergies.  Qty: 90 Tab, Refills: 3      metoprolol tartrate (LOPRESSOR) 25 mg tablet Take 0.5 Tabs by mouth two (2) times a day.  Qty: 90 Tab, Refills: 1    Associated Diagnoses: Essential hypertension      sennosides (Senna) 8.6 mg cap Take  by mouth.      albuterol-ipratropium (DUO-NEB) 2.5 mg-0.5 mg/3 ml nebu 3 mL by Nebulization route as directed.      VITAMIN C 500 mg tablet Take 1 Tab by mouth daily.      ELIQUIS 5 mg tablet Take 1 Tab by mouth daily.      albuterol (PROVENTIL HFA, VENTOLIN HFA, PROAIR HFA) 90 mcg/actuation inhaler Take  by inhalation.      hydrocortisone (PREPARATION H HYDROCORTISONE) 1 % topical cream Apply  to affected area two (2) times a day. use thin layer  Qty: 30 g, Refills: 6      cholecalciferol (VITAMIN D3) 1,000 unit tablet Take 1 Tab by mouth daily.  Qty: 90 Tab, Refills: 3          Current Facility-Administered Medications   Medication Dose Route Frequency   • 0.9% sodium chloride infusion  125 mL/hr IntraVENous CONTINUOUS   • sodium chloride (NS) flush 5-40 mL  5-40 mL IntraVENous Q8H   • sodium chloride (NS) flush 5-40 mL  5-40 mL IntraVENous PRN   • acetaminophen (TYLENOL) tablet 1,000 mg  1,000 mg Oral Q6H   • oxyCODONE IR (ROXICODONE) tablet 5 mg  5 mg Oral Q4H PRN   • HYDROmorphone (DILAUDID) injection 1 mg  1 mg IntraVENous Q4H PRN   • naloxone (NARCAN) injection 0.4 mg  0.4 mg IntraVENous PRN   • ondansetron (ZOFRAN) injection 4 mg  4 mg IntraVENous Q4H PRN   •  diphenhydrAMINE (BENADRYL) injection 12.5 mg  12.5 mg IntraVENous Q4H PRN    docusate sodium (COLACE) capsule 100 mg  100 mg Oral BID    ondansetron (ZOFRAN) injection 4 mg  4 mg IntraVENous ONCE    pregabalin (LYRICA) capsule 100 mg  100 mg Oral TID    cefTRIAXone (ROCEPHIN) 2 g in sterile water (preservative free) 20 mL IV syringe  2 g IntraVENous Q24H    DAPTOmycin (CUBICIN) 500 mg in 0.9% sodium chloride 10 mL IV Syringe  500 mg IntraVENous Q24H    amitriptyline (ELAVIL) tablet 10 mg  10 mg Oral QHS       Allergies: Peanut, Zyvox [linezolid], Allopurinol, Norvasc [amlodipine], Chicken derived, Neurontin [gabapentin], Prednisone, and [place] [poultry]    Family History   Problem Relation Age of Onset    Arthritis-rheumatoid Mother     Stroke Mother     Heart Disease Father     Diabetes Father     Hypertension Father     Diabetes Brother     Hypertension Brother     Kidney Disease Brother      Social History     Socioeconomic History    Marital status:      Spouse name: Not on file    Number of children: Not on file    Years of education: Not on file    Highest education level: Not on file   Occupational History    Not on file   Social Needs    Financial resource strain: Not on file    Food insecurity     Worry: Not on file     Inability: Not on file    Transportation needs     Medical: Not on file     Non-medical: Not on file   Tobacco Use    Smoking status: Never Smoker    Smokeless tobacco: Never Used   Substance and Sexual Activity    Alcohol use: Yes     Comment: Socially    Drug use: No    Sexual activity: Never   Lifestyle    Physical activity     Days per week: Not on file     Minutes per session: Not on file    Stress: Not on file   Relationships    Social connections     Talks on phone: Not on file     Gets together: Not on file     Attends Sikh service: Not on file     Active member of club or organization: Not on file     Attends meetings of clubs or organizations: Not on file     Relationship status: Not on file    Intimate partner violence     Fear of current or ex partner: Not on file     Emotionally abused: Not on file     Physically abused: Not on file     Forced sexual activity: Not on file   Other Topics Concern    Not on file   Social History Narrative    Not on file     Social History     Tobacco Use   Smoking Status Never Smoker   Smokeless Tobacco Never Used        Temp (24hrs), Av.6 °F (36.4 °C), Min:96.2 °F (35.7 °C), Max:98.1 °F (36.7 °C)    Visit Vitals  /80 (BP 1 Location: Right arm, BP Patient Position: At rest)   Pulse 78   Temp (!) 96.2 °F (35.7 °C)   Resp 16   Ht 5' 2\" (1.575 m)   Wt 74.8 kg (165 lb)   SpO2 100%   Breastfeeding No   BMI 30.18 kg/m²       ROS: 12 point ROS obtained in details. Pertinent positives as mentioned in HPI,   otherwise negative    Physical Exam:    General:  female laying on the bed  bed AAOx3 in no acute distress. General:   awake alert and oriented   HEENT:  Normocephalic, atraumatic, PERRL, EOMI, no scleral icterus or pallor; no conjunctival hemmohage;  nasal and oral mucous are moist and without evidence of lesions. No thrush. Neck supple, no bruits. Lymph Nodes:   no cervical, axillary or inguinal adenopathy   Lungs:   non-labored, bilaterally clear to auscultation- no crackles wheezes rales or rhonchi   Heart:  RRR, s1 and s2; no  rubs or gallops, no edema, + pedal pulses   Abdomen:  soft, non-distended, active bowel sounds, no hepatomegaly, no splenomegaly. Non-tender   Genitourinary: King in place   Extremities:   no clubbing, cyanosis; no joint effusions or swelling; Full ROM of all large joints to the upper and lower extremities; muscle mass appropriate for age   Neurologic:  No gross focal sensory abnormalities; 5/5 muscle strength to upper and lower extremities. Speech appropriate.  Cranial nerves intact                        Skin:   Healed scar left flank   Back:  no spinal or paraspinal muscle tenderness or rigidity, no CVA tenderness     Psychiatric:  No suicidal or homicidal ideations, appropriate mood and affect         Labs: Results:   Chemistry Recent Labs     01/11/21  1720   *      K 3.9      CO2 31   BUN 15   CREA 0.54*   CA 9.5   AGAP 3   BUCR 28*      CBC w/Diff Recent Labs     01/11/21  1720   WBC 6.5   RBC 4.05*   HGB 12.2   HCT 38.1         Microbiology No results for input(s): CULT in the last 72 hours.        RADIOLOGY:    All available imaging studies/reports in [facility] for this admission were reviewed    Dr. Josh Costello, Infectious Disease Specialist  [phone]  January 12, 2021  10:52 AM

## 2021-01-12 NOTE — PROGRESS NOTES
Patient evaluated at bedside. She's in NAD. She is S/P right JJ stent placement today. She was unable to have PCNL due to current infection. She is currently being treated for an infected stone and UTI.  VSS. She's eating and drinking without difficulty. Anticipate right PCNL next week. Urine culture from 1/11/21 grew >100k GNR. Currently on Rocephin and Daptomycin. Will need to continue. Plan:  ID following for antibiotic therapy. Consider continued inpatient status due to the need of abx for MDR  Plan for PCNL on Tuesday- First case  Repeat Urine culture pending  NPO after midnight 1/19/21 @ 0001  Will see daily      LES Muhammad  [facility]: [phone]  Pager: [phone]    I am available M-F 8:00am- 5:00pm. After 5pm on weekdays and weekends, please call our on call provider at [phone]. Thank you.

## 2021-01-12 NOTE — ANESTHESIA PREPROCEDURE EVALUATION
Relevant Problems   No relevant active problems       Anesthetic History   No history of anesthetic complications            Review of Systems / Medical History  Patient summary reviewed and pertinent labs reviewed    Pulmonary  Within defined limits                 Neuro/Psych   Within defined limits           Cardiovascular    Hypertension              Exercise tolerance: >4 METS  Comments: Medical History  Chronic pain  Hypertension  Prediabetes  Fatty liver  Paraplegia (HCC)  Vitamin D deficiency  Neurogenic bladder  Bacteriuria  Urinary incontinence     GI/Hepatic/Renal           Liver disease     Endo/Other    Diabetes    Obesity     Other Findings              Physical Exam    Airway  Mallampati: II  TM Distance: 4 - 6 cm  Neck ROM: normal range of motion   Mouth opening: Normal     Cardiovascular  Regular rate and rhythm,  S1 and S2 normal,  no murmur, click, rub, or gallop  Rhythm: regular  Rate: normal         Dental  No notable dental hx       Pulmonary  Breath sounds clear to auscultation               Abdominal  GI exam deferred       Other Findings            Anesthetic Plan    ASA: 4  Anesthesia type: general          Induction: Intravenous  Anesthetic plan and risks discussed with: Patient

## 2021-01-12 NOTE — PROGRESS NOTES
2130  Spoke with on-call provider to clarify patient's multiple held orders and other medications   Received verbal order for the following:    Amitriptyline 10 mg PO every bedtime  Norco 5-325 mg per tablet, PO qh PRN  Lyrica 100 mg capsule, PO every bedtime  NPO after midnight  Swab Rapid Test for Covid

## 2021-01-12 NOTE — PERIOP NOTES
TRANSFER - IN REPORT:    Verbal report received from 2 Mans[place]Patricia Newberry RN(name) on [facility]  being received from [facility](unit) for routine progression of care      Report consisted of patients Situation, Background, Assessment and   Recommendations(SBAR). Information from the following report(s) SBAR was reviewed with the receiving nurse. Opportunity for questions and clarification was provided. Assessment completed upon patients arrival to unit and care assumed.

## 2021-01-12 NOTE — PROGRESS NOTES
Comprehensive Nutrition Assessment    Type and Reason for Visit: Initial, Wound    Nutrition Recommendations/Plan:  - Add supplements: Hugo Drink, BID.  - Add well cooked vegetables to diet order (pt preference). - IVF per MD.    Nutrition Assessment:  Admitted with urinary tract bacterial infections and kidney stone. NPO this morning and diet resumed s/p right JJ stent placement. Pt with excellent intake of lunch meal today. Tolerating diet. Paraplegia with pressure injury noted. Malnutrition Assessment:  Malnutrition Status:  No malnutrition      Nutrition History and Allergies: PMHx- fatty liver, HTN, spinal cord injury, paraplegia, prediabetes, colostomy and vitamin D deficiency. Pt denies recent changes in wt, appetite or meal intake. Allergy to chicken, turkey & peanut. Estimated Daily Nutrient Needs:  Energy (kcal): 0223-2955; Weight Used for Energy Requirements: Current(75 kg)  Protein (g): ; Weight Used for Protein Requirements: Current(1.2-1.5)  Fluid (ml/day): 2856-3025; Method Used for Fluid Requirements: 1 ml/kcal    Nutrition Related Findings:  Colostomy with pt reported last BM 1/9 (multiple). NS at 125 mL/hr & Colace BID. Difficutly tolerating tough vegetables. Wounds:    Pressure injury, Stage II, Skin tears       Current Nutrition Therapies:  DIET REGULAR    Anthropometric Measures:  · Height:  5' 2\" (157.5 cm)  · Current Body Wt:  74.8 kg (164 lb 14.5 oz)   · Admission Body Wt:  165 lb    · Usual Body Wt:  74.8 kg (165 lb)     · Ideal Body Wt:  110 lbs:  149.9 %   · Adjusted Body Weight:  177.3;  Weight Adjustment for: Paraplegia   · Adjusted BMI:  32.5    · BMI Category:  Obese class 1 (BMI 30.0-34.9)(adjusted BMI)       Nutrition Diagnosis:   · Increased nutrient needs related to (increased demand for wound healing) as evidenced by wounds    Nutrition Interventions:   Food and/or Nutrient Delivery: Continue current diet, Start oral nutrition supplement, IV fluid delivery  Nutrition Education and Counseling: Education not indicated  Coordination of Nutrition Care: Continue to monitor while inpatient    Goals:  PO nutrition intake will meet >75% of patient estimated nutritional needs within the next 7 days.        Nutrition Monitoring and Evaluation:   Behavioral-Environmental Outcomes: None identified  Food/Nutrient Intake Outcomes: Diet advancement/tolerance, Food and nutrient intake, Supplement intake, IVF intake  Physical Signs/Symptoms Outcomes: Biochemical data, Chewing or swallowing, Nutrition focused physical findings, Skin    Discharge Planning:    No discharge needs at this time     Electronically signed by Jeramy Reynoso RD on 1/12/2021 at 2:34 PM    Contact: [phone]

## 2021-01-12 NOTE — ANESTHESIA POSTPROCEDURE EVALUATION
Procedure(s):  CYSTO, RETROGRADE/DOUBLE J STENT/C-ARM.     general    Anesthesia Post Evaluation      Multimodal analgesia: multimodal analgesia used between 6 hours prior to anesthesia start to PACU discharge  Patient location during evaluation: bedside  Patient participation: complete - patient cannot participate  Level of consciousness: awake  Pain score: 1  Pain management: adequate  Airway patency: patent  Anesthetic complications: no  Cardiovascular status: acceptable  Respiratory status: acceptable  Hydration status: acceptable  Post anesthesia nausea and vomiting:  none      INITIAL Post-op Vital signs:   Vitals Value Taken Time   BP 91/55 01/12/21 0833   Temp 36 °C (96.8 °F) 01/12/21 0833   Pulse 88 01/12/21 0833   Resp 12 01/12/21 0833   SpO2 99 % 01/12/21 0809

## 2021-01-12 NOTE — PERIOP NOTES
TRANSFER - OUT REPORT:    Verbal report given to 2002 Peter Alonzo RN(name) on Teresa Cole  being transferred to 5 S(unit) for routine post - op       Report consisted of patients Situation, Background, Assessment and   Recommendations(SBAR). Information from the following report(s) SBAR and Procedure Summary was reviewed with the receiving nurse. Lines:   Peripheral IV 01/11/21 Left Forearm (Active)   Site Assessment Clean, dry, & intact 01/12/21 0832   Phlebitis Assessment 0 01/12/21 0832   Infiltration Assessment 0 01/12/21 0832   Dressing Status Clean, dry, & intact 01/12/21 0832   Dressing Type Transparent;Tape 01/12/21 0832   Hub Color/Line Status Blue;Capped 01/12/21 1177        Opportunity for questions and clarification was provided.       Patient transported with:   O2 @ 3 liters  Tech

## 2021-01-12 NOTE — PROGRESS NOTES
Problem: Falls - Risk of  Goal: *Absence of Falls  Description: Document Starleen Freeze Fall Risk and appropriate interventions in the flowsheet. Outcome: Progressing Towards Goal  Note: Fall Risk Interventions:            Medication Interventions: Patient to call before getting OOB, Teach patient to arise slowly    Elimination Interventions: Call light in reach              Problem: Patient Education: Go to Patient Education Activity  Goal: Patient/Family Education  Outcome: Progressing Towards Goal     Problem: Pressure Injury - Risk of  Goal: *Prevention of pressure injury  Description: Document Orlando Scale and appropriate interventions in the flowsheet.   Outcome: Progressing Towards Goal  Note: Pressure Injury Interventions:  Sensory Interventions: Assess need for specialty bed, Keep linens dry and wrinkle-free, Minimize linen layers    Moisture Interventions: Absorbent underpads, Minimize layers    Activity Interventions: PT/OT evaluation, Pressure redistribution bed/mattress(bed type)    Mobility Interventions: PT/OT evaluation

## 2021-01-13 LAB
ANION GAP SERPL CALC-SCNC: 6 MMOL/L (ref 3–18)
BACTERIA SPEC CULT: NORMAL
BACTERIA SPEC CULT: NORMAL
BUN SERPL-MCNC: 15 MG/DL (ref 7–18)
BUN/CREAT SERPL: 25 (ref 12–20)
CALCIUM SERPL-MCNC: 9.1 MG/DL (ref 8.5–10.1)
CHLORIDE SERPL-SCNC: 108 MMOL/L (ref 100–111)
CO2 SERPL-SCNC: 27 MMOL/L (ref 21–32)
CREAT SERPL-MCNC: 0.6 MG/DL (ref 0.6–1.3)
ERYTHROCYTE [DISTWIDTH] IN BLOOD BY AUTOMATED COUNT: 15.7 % (ref 11.6–14.5)
GLUCOSE SERPL-MCNC: 103 MG/DL (ref 74–99)
HCT VFR BLD AUTO: 36.7 % (ref 35–45)
HGB BLD-MCNC: 11.5 G/DL (ref 12–16)
MCH RBC QN AUTO: 29.5 PG (ref 24–34)
MCHC RBC AUTO-ENTMCNC: 31.3 G/DL (ref 31–37)
MCV RBC AUTO: 94.1 FL (ref 74–97)
PLATELET # BLD AUTO: 144 K/UL (ref 135–420)
PMV BLD AUTO: 10 FL (ref 9.2–11.8)
POTASSIUM SERPL-SCNC: 4.1 MMOL/L (ref 3.5–5.5)
RBC # BLD AUTO: 3.9 M/UL (ref 4.2–5.3)
SERVICE CMNT-IMP: NORMAL
SERVICE CMNT-IMP: NORMAL
SODIUM SERPL-SCNC: 141 MMOL/L (ref 136–145)
WBC # BLD AUTO: 5.7 K/UL (ref 4.6–13.2)

## 2021-01-13 PROCEDURE — 85027 COMPLETE CBC AUTOMATED: CPT

## 2021-01-13 PROCEDURE — 74011250637 HC RX REV CODE- 250/637: Performed by: UROLOGY

## 2021-01-13 PROCEDURE — 80048 BASIC METABOLIC PNL TOTAL CA: CPT

## 2021-01-13 PROCEDURE — 74011000250 HC RX REV CODE- 250: Performed by: INTERNAL MEDICINE

## 2021-01-13 PROCEDURE — 36415 COLL VENOUS BLD VENIPUNCTURE: CPT

## 2021-01-13 PROCEDURE — 65270000029 HC RM PRIVATE

## 2021-01-13 PROCEDURE — 74011250636 HC RX REV CODE- 250/636: Performed by: INTERNAL MEDICINE

## 2021-01-13 PROCEDURE — 74011250636 HC RX REV CODE- 250/636: Performed by: UROLOGY

## 2021-01-13 PROCEDURE — 2709999900 HC NON-CHARGEABLE SUPPLY

## 2021-01-13 RX ADMIN — ACETAMINOPHEN 1000 MG: 325 TABLET ORAL at 10:03

## 2021-01-13 RX ADMIN — ACETAMINOPHEN 1000 MG: 325 TABLET ORAL at 23:39

## 2021-01-13 RX ADMIN — ACETAMINOPHEN 1000 MG: 325 TABLET ORAL at 18:06

## 2021-01-13 RX ADMIN — SODIUM CHLORIDE 125 ML/HR: 900 INJECTION, SOLUTION INTRAVENOUS at 05:00

## 2021-01-13 RX ADMIN — PREGABALIN 100 MG: 50 CAPSULE ORAL at 18:06

## 2021-01-13 RX ADMIN — AMITRIPTYLINE HYDROCHLORIDE 10 MG: 10 TABLET, FILM COATED ORAL at 21:20

## 2021-01-13 RX ADMIN — Medication 10 ML: at 21:24

## 2021-01-13 RX ADMIN — PREGABALIN 100 MG: 50 CAPSULE ORAL at 10:03

## 2021-01-13 RX ADMIN — ACETAMINOPHEN 1000 MG: 325 TABLET ORAL at 05:00

## 2021-01-13 RX ADMIN — Medication 10 ML: at 05:01

## 2021-01-13 RX ADMIN — SODIUM CHLORIDE 500 MG: 9 INJECTION, SOLUTION INTRAMUSCULAR; INTRAVENOUS; SUBCUTANEOUS at 05:01

## 2021-01-13 RX ADMIN — DOCUSATE SODIUM 100 MG: 100 CAPSULE, LIQUID FILLED ORAL at 18:06

## 2021-01-13 RX ADMIN — SODIUM CHLORIDE 125 ML/HR: 900 INJECTION, SOLUTION INTRAVENOUS at 21:21

## 2021-01-13 RX ADMIN — PREGABALIN 100 MG: 50 CAPSULE ORAL at 23:39

## 2021-01-13 RX ADMIN — CEFTRIAXONE SODIUM 2 G: 2 INJECTION, POWDER, FOR SOLUTION INTRAMUSCULAR; INTRAVENOUS at 14:54

## 2021-01-13 RX ADMIN — Medication 10 ML: at 14:54

## 2021-01-13 NOTE — PROGRESS NOTES
Problem: Falls - Risk of  Goal: *Absence of Falls  Description: Document Jadon Baldwin Fall Risk and appropriate interventions in the flowsheet. Outcome: Progressing Towards Goal  Note: Fall Risk Interventions:            Medication Interventions: Assess postural VS orthostatic hypotension, Evaluate medications/consider consulting pharmacy    Elimination Interventions: Bed/chair exit alarm, Call light in reach, Patient to call for help with toileting needs, Toilet paper/wipes in reach, Toileting schedule/hourly rounds              Problem: Patient Education: Go to Patient Education Activity  Goal: Patient/Family Education  Outcome: Progressing Towards Goal     Problem: Pressure Injury - Risk of  Goal: *Prevention of pressure injury  Description: Document Orlando Scale and appropriate interventions in the flowsheet. Outcome: Progressing Towards Goal  Note: Pressure Injury Interventions:  Sensory Interventions: Assess changes in LOC, Assess need for specialty bed, Float heels, Keep linens dry and wrinkle-free, Maintain/enhance activity level, Minimize linen layers, Monitor skin under medical devices, Pad between skin to skin, Pressure redistribution bed/mattress (bed type), Turn and reposition approx. every two hours (pillows and wedges if needed)    Moisture Interventions: Absorbent underpads, Assess need for specialty bed, Internal/External urinary devices, Limit adult briefs, Maintain skin hydration (lotion/cream), Minimize layers, Offer toileting Q_hr    Activity Interventions: Assess need for specialty bed, Pressure redistribution bed/mattress(bed type)    Mobility Interventions: Assess need for specialty bed, Float heels, HOB 30 degrees or less, Pressure redistribution bed/mattress (bed type), Turn and reposition approx.  every two hours(pillow and wedges)    Nutrition Interventions: Document food/fluid/supplement intake    Friction and Shear Interventions: Apply protective barrier, creams and emollients, Foam dressings/transparent film/skin sealants, HOB 30 degrees or less, Lift sheet, Lift team/patient mobility team, Minimize layers, Transferring/repositioning devices                Problem: Patient Education: Go to Patient Education Activity  Goal: Patient/Family Education  Outcome: Progressing Towards Goal     Problem: Nutrition Deficit  Goal: *Optimize nutritional status  Outcome: Progressing Towards Goal     Problem: Kidney [address] (Adult)  Goal: *Elimination of kidney stone(s)  Outcome: Progressing Towards Goal  Goal: *Control of acute pain  Outcome: Progressing Towards Goal     Problem: Patient Education: Go to Patient Education Activity  Goal: Patient/Family Education  Outcome: Progressing Towards Goal     Problem: Urinary Tract Infection - Adult  Goal: *Absence of infection signs and symptoms  Outcome: Progressing Towards Goal     Problem: Patient Education: Go to Patient Education Activity  Goal: Patient/Family Education  Outcome: Progressing Towards Goal     Problem: Ostomy Care  Goal: *Patient pouching appliance will fit properly and maintain integrity at least three to five days  Description: Infection control procedures (eg, clean dressings, clean gloves, hand washing, precautions to isolate wound from contamination, sterile instruments used for wound debridement) should be implemented.   Outcome: Progressing Towards Goal  Goal: *Acceptance of change in body image  Outcome: Progressing Towards Goal     Problem: Patient Education: Go to Patient Education Activity  Goal: Patient/Family Education  Outcome: Progressing Towards Goal     Problem: Patient Education: Go to Patient Education Activity  Goal: Patient/Family Education  Outcome: Progressing Towards Goal     Problem: Impaired Skin Integrity/Pressure Injury Treatment  Goal: *Improvement of Existing Pressure Injury  Outcome: Progressing Towards Goal  Goal: *Prevention of pressure injury  Description: Document Orlando Scale and appropriate interventions in the flowsheet.   Outcome: Progressing Towards Goal     Problem: Patient Education: Go to Patient Education Activity  Goal: Patient/Family Education  Outcome: Progressing Towards Goal

## 2021-01-13 NOTE — PROGRESS NOTES
Bedside shift change report given to [facility] (oncoming nurse) by Chaparro Van (offgoing nurse). Report included the following information SBAR, Kardex, Procedure Summary, Intake/Output and MAR.

## 2021-01-13 NOTE — PROGRESS NOTES
Infectious Disease progress Note        Reason: Complicated UTI, cystitis-history of vancomycin-resistant Enterococcus UTI, Klebsiella UTI    Current abx Prior abx   Ceftriaxone since   daptomycin since  Fosfomycin on   Meropenem on      Lines:       Assessment :      71 y/o woman, spinal cord injury, neurogenic bladder with multiple initially thought to be intrarenal stones on CT in 2020 admitted to [facility] on 2021 for PCNL. Urine culture 2020-Aerococcus, yeast  Urine culture 2020-Klebsiella pneumoniae, gram-positive cocci  Urine culture 2020-Klebsiella, VRE  Urine culture 2021-50,000 colonies of Klebsiella, mixed culture  Urine culture 2020-greater than 100,000 colonies of gram-negative nabor    Now in need for urological intervention    Clinical presentation consistent with complicated cystitis secondary to Klebsiella pneumoniae    Status post cystoscopy, stent placement on 2021. Significant bladder inflammation noted intraoperatively consistent with cystitis    Urine culture  greater than 100,000 colonies of gram-negative rods    Plans for further urological intervention noted. Will need to give effective antibiotics to eradicate bacteriuria prior to urological procedure. Antibiotic management complicated due to history of linezolid allergy. Patient states that she developed numbness of her whole body when she took linezolid in 2020. She refuses to take trial of linezolid since she felt that she almost  when she took it. Recommendations:    1. Recommend ceftriaxone, discontinue daptomycin since no resistant gram-positive isolated in recent urine culture  2. Follow-up preop, Intra-Op urine cultures and modify antibiotics as needed  3. Further antibiotic recommendations based on the culture results     . Above plan was discussed in details with patient. Please call me if any further questions or concerns.  Will continue to participate in the care of this patient. HPI:    Feels better. No new complaint      home Medication List    Details   HYDROcodone-acetaminophen (NORCO) 5-325 mg per tablet Take  by mouth.      pregabalin (LYRICA) 100 mg capsule Take 1 Cap by mouth nightly. Max Daily Amount: 100 mg. Qty: 30 Cap, Refills: 2    Associated Diagnoses: Radicular pain of right lower back      acetaminophen (TYLENOL) 650 mg/20.3 mL solution Take  by mouth every six (6) hours as needed for Fever. ondansetron (ZOFRAN ODT) 4 mg disintegrating tablet Take 1 Tab by mouth every eight (8) hours as needed for Nausea. Qty: 90 Tab, Refills: 0      amitriptyline (ELAVIL) 10 mg tablet Take 1 Tab by mouth nightly. Qty: 90 Tab, Refills: 3      carboxymethylcellulose sodium (REFRESH TEARS) 0.5 % drop ophthalmic solution Administer 1 Drop to both eyes two (2) times a day. Qty: 30 mL, Refills: 11      loratadine (CLARITIN) 10 mg tablet Take 1 Tab by mouth daily as needed for Allergies. Qty: 90 Tab, Refills: 3      metoprolol tartrate (LOPRESSOR) 25 mg tablet Take 0.5 Tabs by mouth two (2) times a day. Qty: 90 Tab, Refills: 1    Associated Diagnoses: Essential hypertension      sennosides (Senna) 8.6 mg cap Take  by mouth. albuterol-ipratropium (DUO-NEB) 2.5 mg-0.5 mg/3 ml nebu 3 mL by Nebulization route as directed. VITAMIN C 500 mg tablet Take 1 Tab by mouth daily. ELIQUIS 5 mg tablet Take 1 Tab by mouth daily. albuterol (PROVENTIL HFA, VENTOLIN HFA, PROAIR HFA) 90 mcg/actuation inhaler Take  by inhalation. hydrocortisone (PREPARATION H HYDROCORTISONE) 1 % topical cream Apply  to affected area two (2) times a day. use thin layer  Qty: 30 g, Refills: 6      cholecalciferol (VITAMIN D3) 1,000 unit tablet Take 1 Tab by mouth daily.   Qty: 90 Tab, Refills: 3          Current Facility-Administered Medications   Medication Dose Route Frequency    0.9% sodium chloride infusion  125 mL/hr IntraVENous CONTINUOUS    sodium chloride (NS) flush 5-40 mL  5-40 mL IntraVENous Q8H    sodium chloride (NS) flush 5-40 mL  5-40 mL IntraVENous PRN    acetaminophen (TYLENOL) tablet 1,000 mg  1,000 mg Oral Q6H    oxyCODONE IR (ROXICODONE) tablet 5 mg  5 mg Oral Q4H PRN    HYDROmorphone (DILAUDID) injection 1 mg  1 mg IntraVENous Q4H PRN    naloxone (NARCAN) injection 0.4 mg  0.4 mg IntraVENous PRN    ondansetron (ZOFRAN) injection 4 mg  4 mg IntraVENous Q4H PRN    diphenhydrAMINE (BENADRYL) injection 12.5 mg  12.5 mg IntraVENous Q4H PRN    docusate sodium (COLACE) capsule 100 mg  100 mg Oral BID    pregabalin (LYRICA) capsule 100 mg  100 mg Oral TID    cefTRIAXone (ROCEPHIN) 2 g in sterile water (preservative free) 20 mL IV syringe  2 g IntraVENous Q24H    DAPTOmycin (CUBICIN) 500 mg in 0.9% sodium chloride 10 mL IV Syringe  500 mg IntraVENous Q24H    amitriptyline (ELAVIL) tablet 10 mg  10 mg Oral QHS       Allergies: Peanut, Zyvox [linezolid], Allopurinol, Norvasc [amlodipine], Chicken derived, Neurontin [gabapentin], Prednisone, and [place] [poultry]    Temp (24hrs), Av.8 °F (36.6 °C), Min:97.3 °F (36.3 °C), Max:98.2 °F (36.8 °C)    Visit Vitals  /73   Pulse (!) 103   Temp 98 °F (36.7 °C)   Resp 16   Ht 5' 2\" (1.575 m)   Wt 74.8 kg (165 lb)   SpO2 95%   Breastfeeding No   BMI 30.18 kg/m²       ROS: 12 point ROS obtained in details. Pertinent positives as mentioned in HPI,   otherwise negative    Physical Exam:    General:  female laying on the bed  bed AAOx3 in no acute distress. General:   awake alert and oriented   HEENT:  Normocephalic, atraumatic, PERRL, EOMI, no scleral icterus or pallor; no conjunctival hemmohage;  nasal and oral mucous are moist and without evidence of lesions. No thrush. Neck supple, no bruits.    Lymph Nodes:   no cervical, axillary or inguinal adenopathy   Lungs:   non-labored, bilaterally clear to auscultation- no crackles wheezes rales or rhonchi   Heart:  RRR, s1 and s2; no  rubs or gallops, no edema, + pedal pulses   Abdomen:  soft, non-distended, active bowel sounds, no hepatomegaly, no splenomegaly. Non-tender   Genitourinary: King in place   Extremities:   no clubbing, cyanosis; no joint effusions or swelling; Full ROM of all large joints to the upper and lower extremities; muscle mass appropriate for age   Neurologic:  No gross focal sensory abnormalities; 5/5 muscle strength to upper and lower extremities. Speech appropriate.  Cranial nerves intact                        Skin:   Healed scar left flank   Back:  no spinal or paraspinal muscle tenderness or rigidity, no CVA tenderness     Psychiatric:  No suicidal or homicidal ideations, appropriate mood and affect         Labs: Results:   Chemistry Recent Labs     01/13/21  0508 01/11/21  1720   * 117*    140   K 4.1 3.9    106   CO2 27 31   BUN 15 15   CREA 0.60 0.54*   CA 9.1 9.5   AGAP 6 3   BUCR 25* 28*      CBC w/Diff Recent Labs     01/13/21  0508 01/11/21  1720   WBC 5.7 6.5   RBC 3.90* 4.05*   HGB 11.5* 12.2   HCT 36.7 38.1    148      Microbiology Recent Labs     01/11/21  1640   CULT GRAM NEGATIVE RODS*          RADIOLOGY:    All available imaging studies/reports in [facility] for this admission were reviewed    Dr. Eleonora Renteria, Infectious Disease Specialist  [phone]  January 13, 2021  10:52 AM

## 2021-01-13 NOTE — PROGRESS NOTES
Urology Progress Note        Assessment/Plan:     Patient Active Problem List   Diagnosis Code    Essential hypertension I10    Elevated blood sugar R73.9    Transaminitis R74.01    Vitamin D deficiency E55.9    Calculus of gallbladder without cholecystitis without obstruction K80.20    Hepatic cyst K76.89    Lumbar radicular pain M54.16    Thoracic spinal cord injury ([address] S24.109A    Paraplegia ([address] G82.20    Pressure injury of sacral region, stage 4 ([address] L89.154    Full incontinence of feces R15.9    Decubitus ulcer of coccygeal region, stage 3 ([address] L89.153    History of MRSA infection Z86.14    Urinary retention R33.9    Subacute osteomyelitis, other site ([address] M86.28    History of Clostridium difficile colitis Z86.19    Other pulmonary embolism without acute cor pulmonale (HCC) I26.99    Acute cystitis without hematuria N30.00    Acute hyponatremia E87.1    Anemia in other chronic diseases classified elsewhere D63.8    Colostomy present ([address] Z93.3    MARQUITA (acute kidney injury) ([address] N17.9    Fever R50.9    Hyperkalemia E87.5    Leukocytosis D72.829    MRSA (methicillin resistant staph aureus) culture positive Z22.322    Skin ulcer of sacrum, limited to breakdown of skin ([address] L98.421    UTI (urinary tract infection) N39.0    Wound infection T14. 8XXA, L08.9    Urinary tract bacterial infections N39.0, A49.9    Kidney stone N20.0   Assessment:    #1 Obstructing proximal right ureteral stones and additional right infectious nephrolithiasis    S/p cysto, right RPG, right ureteral stent placement on 1/12 by Dr. Sammy Chavez   Pre-op culture 1/11: 100k cfu/mL GNR   UCx 1/12/21 pending   WBC 5.7 < 6.5 on admission   Cr 0.60    #2 Neurogenic bladder with chronic butler      Plan:  Appreciate ID input  Abx per ID- on Ceftriaxone and Daptomycin  Follow cultures  Plan for PCNL next week Tuesday 1/19 first case AM, Dr. Sammy Chavez.    Following closely     Sebastian Rossi PA-C       Subjective:     Daily Progress Note: 2021 8:26 AM    Murtaza Taylor is doing good. She reports pain is absent. She has no new complaints. She is tolerating a solid diet. Tolerating stent and butler well. Butler draining light yellow urine. Objective:     Visit Vitals  /82 (BP 1 Location: Left arm, BP Patient Position: At rest)   Pulse 88   Temp 98 °F (36.7 °C)   Resp 16   Ht 5' 2\" (1.575 m)   Wt 165 lb (74.8 kg)   SpO2 92%   Breastfeeding No   BMI 30.18 kg/m²        Temp (24hrs), Av.5 °F (36.4 °C), Min:96.2 °F (35.7 °C), Max:98.2 °F (36.8 °C)      Intake and Output:  1901 -  0700  In: 1693 [P.O.:220; I.V.:2500]  Out: 3600 [Urine:3600]   07 -  190  In: -   Out: 1000 [Urine:1000]    PHYSICAL EXAMINATION:   Visit Vitals  /82 (BP 1 Location: Left arm, BP Patient Position: At rest)   Pulse 88   Temp 98 °F (36.7 °C)   Resp 16   Ht 5' 2\" (1.575 m)   Wt 165 lb (74.8 kg)   SpO2 92%   Breastfeeding No   BMI 30.18 kg/m²     Constitutional: Well developed, well-nourished female in no acute distress. HEENT: Normocephalic, Atraumatic   CV:   no edema  Respiratory: No respiratory distress or difficulties breathing   Abdomen:  Soft and nontender.  Female: Butler draining light yellow urine   Skin:  Normal color. No evidence of jaundice. Neuro/Psych:  Patient with appropriate affect. Alert and oriented. Lab/Data Review: All lab results for the last 24 hours reviewed.     Labs:     Labs: Results:   Chemistry    Recent Labs     21  0508 21  1720   * 117*    140   K 4.1 3.9    106   CO2 27 31   BUN 15 15   CREA 0.60 0.54*   CA 9.1 9.5   AGAP 6 3   BUCR 25* 28*      CBC w/Diff Recent Labs     21  0508 21  1720   WBC 5.7 6.5   RBC 3.90* 4.05*   HGB 11.5* 12.2   HCT 36.7 38.1    148      Cultures Recent Labs     21  1640   CULT GRAM NEGATIVE RODS*     All Micro Results     Procedure Component Value Units Date/Time    CULTURE, URINE [[id number]] Collected: 01/12/21 0758    Order Status: Completed Updated: 01/13/21 0007    CULTURE, URINE [[id number]] Collected: 01/12/21 0801    Order Status: Completed Updated: 01/13/21 0007    CULTURE, URINE [[id number]] Collected: 01/12/21 1048    Order Status: Completed Specimen: Urine from Kidney Updated: 01/12/21 2108    CULTURE, URINE [[id number]]  (Abnormal) Collected: 01/11/21 1640    Order Status: Completed Specimen: Cath Urine Updated: 01/12/21 1233     Special Requests: NO SPECIAL REQUESTS        [place] Count --        >100,000  COLONIES/mL       Culture result: GRAM NEGATIVE RODS       CULTURE, URINE [[id number]] Collected: 01/12/21 0800    Order Status: Canceled Specimen: Urine from Bladder             Urinalysis Color   Date Value Ref Range Status   07/16/2018 YELLOW/STRAW   Final     Comment:     Color Reference Range: Straw, Yellow or Dark Yellow     Appearance   Date Value Ref Range Status   07/16/2018 CLOUDY (A) CLEAR   Final     Specific gravity   Date Value Ref Range Status   07/16/2018 1.011 1.003 - 1.030   Final     pH (UA)   Date Value Ref Range Status   07/16/2018 7.5 5.0 - 8.0   Final     Protein   Date Value Ref Range Status   07/16/2018 NEGATIVE  NEG mg/dL Final     Ketone   Date Value Ref Range Status   07/16/2018 NEGATIVE  NEG mg/dL Final     Bilirubin   Date Value Ref Range Status   07/16/2018 NEGATIVE  NEG   Final     Blood   Date Value Ref Range Status   07/16/2018 NEGATIVE  NEG   Final     Urobilinogen   Date Value Ref Range Status   07/16/2018 0.2 0.2 - 1.0 EU/dL Final     Nitrites   Date Value Ref Range Status   07/16/2018 NEGATIVE  NEG   Final     Leukocyte Esterase   Date Value Ref Range Status   07/16/2018 SMALL (A) NEG   Final     Potassium   Date Value Ref Range Status   01/13/2021 4.1 3.5 - 5.5 mmol/L Final     Creatinine   Date Value Ref Range Status   01/13/2021 0.60 0.6 - 1.3 MG/DL Final     BUN   Date Value Ref Range Status   01/13/2021 15 7.0 - 18 MG/DL Final      PSA No results for input(s): PSA in the last 72 hours.    Coagulation No results found for: PTP, INR, APTT, INREXT

## 2021-01-13 NOTE — H&P (VIEW-ONLY)
Urology Progress Note Assessment/Plan:  
 
Patient Active Problem List  
Diagnosis Code  Essential hypertension I10  Elevated blood sugar R73.9  Transaminitis R74.01  
 Vitamin D deficiency E55.9  Calculus of gallbladder without cholecystitis without obstruction K80.20  Hepatic cyst K76.89  
 Lumbar radicular pain M54.16  Thoracic spinal cord injury ([facility] S24.109A  Paraplegia ([facility]) G82.20  Pressure injury of sacral region, stage 4 ([facility]) L89.154  
 Full incontinence of feces R15.9  Decubitus ulcer of coccygeal region, stage 3 ([facility]) L89.153  
 History of MRSA infection Z86.14  
 Urinary retention R33.9  Subacute osteomyelitis, other site ([facility] M86.28  
 History of Clostridium difficile colitis Z86.19  
 Other pulmonary embolism without acute cor pulmonale ([facility]) I26.99  
 Acute cystitis without hematuria N30.00  Acute hyponatremia E87.1  Anemia in other chronic diseases classified elsewhere D63.8  Colostomy present ([facility] Z93.3  MARQUITA (acute kidney injury) ([facility] N17.9  Fever R50.9  Hyperkalemia E87.5  Leukocytosis D72.829  
 MRSA (methicillin resistant staph aureus) culture positive Z22.322  
 Skin ulcer of sacrum, limited to breakdown of skin ([facility] L98.421  
 UTI (urinary tract infection) N39.0  Wound infection T14. 8XXA, L08.9  Urinary tract bacterial infections N39.0, A49.9  Kidney stone N20.0 Assessment: 
 
#1 Obstructing proximal right ureteral stones and additional right infectious nephrolithiasis S/p cysto, right RPG, right ureteral stent placement on 1/12 by Dr. Christelle Owens Pre-op culture 1/11: 100k cfu/mL GNR 
 UCx 1/12/21 pending WBC 5.7 < 6.5 on admission Cr 0.60 #2 Neurogenic bladder with chronic butler  
  
Plan: 
Appreciate ID input Abx per ID- on Ceftriaxone and Daptomycin Follow cultures Plan for PCNL next week Tuesday 1/19 first case AM, Dr. Christelle Owens. Following closely Bertrand Gill PA-C Subjective: Daily Progress Note: 2021 8:26 AM 
 
Tortatiana Ok is doing good. She reports pain is absent. She has no new complaints. She is tolerating a solid diet. Tolerating stent and butler well. Butler draining light yellow urine. Objective:  
 
Visit Vitals /82 (BP 1 Location: Left arm, BP Patient Position: At rest) Pulse 88 Temp 98 °F (36.7 °C) Resp 16 Ht 5' 2\" (1.575 m) Wt 165 lb (74.8 kg) SpO2 92% Breastfeeding No  
BMI 30.18 kg/m² Temp (24hrs), Av.5 °F (36.4 °C), Min:96.2 °F (35.7 °C), Max:98.2 °F (36.8 °C) Intake and Output: 
1901 -  0700 In: 7323 [P.O.:220; I.V.:2500] Out: 3600 [KLWDM:9596] 701 - 1900 In: -  
Out: 1000 [Urine:1000] PHYSICAL EXAMINATION:  
Visit Vitals /82 (BP 1 Location: Left arm, BP Patient Position: At rest) Pulse 88 Temp 98 °F (36.7 °C) Resp 16 Ht 5' 2\" (1.575 m) Wt 165 lb (74.8 kg) SpO2 92% Breastfeeding No  
BMI 30.18 kg/m² Constitutional: Well developed, well-nourished female in no acute distress. HEENT: Normocephalic, Atraumatic CV:   no edema Respiratory: No respiratory distress or difficulties breathing Abdomen:  Soft and nontender.  Female: Butler draining light yellow urine Skin:  Normal color. No evidence of jaundice. Neuro/Psych:  Patient with appropriate affect. Alert and oriented. Lab/Data Review: All lab results for the last 24 hours reviewed. Labs:  
 
Labs: Results:  
Chemistry Recent Labs  
  21 
0508 21 
1720 * 117*  140  
K 4.1 3.9  106 CO2 27 31 BUN 15 15 CREA 0.60 0.54* CA 9.1 9.5 AGAP 6 3 BUCR 25* 28* CBC w/Diff Recent Labs  
  21 
0508 21 
1720 WBC 5.7 6.5  
RBC 3.90* 4.05* HGB 11.5* 12.2 HCT 36.7 38.1  148 Cultures Recent Labs  
  21 
1640 CULT GRAM NEGATIVE RODS* All Micro Results Procedure Component Value Units Date/Time Rickey Hsu [[id number]] Collected: 01/12/21 0758 Order Status: Completed Updated: 01/13/21 0007 CULTURE, URINE [[id number]] Collected: 01/12/21 0801 Order Status: Completed Updated: 01/13/21 0007 CULTURE, URINE [[id number]] Collected: 01/12/21 1048 Order Status: Completed Specimen: Urine from Kidney Updated: 01/12/21 2108 CULTURE, URINE [[id number]]  (Abnormal) Collected: 01/11/21 1640 Order Status: Completed Specimen: Cath Urine Updated: 01/12/21 1233 Special Requests: NO SPECIAL REQUESTS [place] Count --     
  >100,000 COLONIES/mL Culture result: GRAM NEGATIVE RODS     
 CULTURE, URINE [[id number]] Collected: 01/12/21 0800 Order Status: Canceled Specimen: Urine from Bladder Urinalysis Color Date Value Ref Range Status 07/16/2018 YELLOW/STRAW   Final  
  Comment:  
  Color Reference Range: Straw, Yellow or Dark Yellow Appearance Date Value Ref Range Status 07/16/2018 CLOUDY (A) CLEAR   Final  
 
Specific gravity Date Value Ref Range Status 07/16/2018 1.011 1.003 - 1.030   Final  
 
pH (UA) Date Value Ref Range Status 07/16/2018 7.5 5.0 - 8.0   Final  
 
Protein Date Value Ref Range Status 07/16/2018 NEGATIVE  NEG mg/dL Final  
 
Ketone Date Value Ref Range Status 07/16/2018 NEGATIVE  NEG mg/dL Final  
 
Bilirubin Date Value Ref Range Status 07/16/2018 NEGATIVE  NEG   Final  
 
Blood Date Value Ref Range Status 07/16/2018 NEGATIVE  NEG   Final  
 
Urobilinogen Date Value Ref Range Status 07/16/2018 0.2 0.2 - 1.0 EU/dL Final  
 
Nitrites Date Value Ref Range Status 07/16/2018 NEGATIVE  NEG   Final  
 
Leukocyte Esterase Date Value Ref Range Status 07/16/2018 SMALL (A) NEG   Final  
 
Potassium Date Value Ref Range Status 01/13/2021 4.1 3.5 - 5.5 mmol/L Final  
 
Creatinine Date Value Ref Range Status 01/13/2021 0.60 0.6 - 1.3 MG/DL Final  
 
BUN Date Value Ref Range Status 01/13/2021 15 7.0 - 18 MG/DL Final  
  
PSA No results for input(s): PSA in the last 72 hours.   
Coagulation No results found for: PTP, INR, APTT, INREXT

## 2021-01-13 NOTE — ROUTINE PROCESS
Received report from [facility]. Pt AAOx3, NAD, breathing non labored, on room air, HOB up. IV site clean, dry and intact. Bed at the lowest level on lock position, call bell w/i reach. Bedside and Verbal shift change report given to ARACELI Mccullough (oncoming nurse) by me (offgoing nurse). Report included the following information SBAR, Kardex, Procedure Summary, Intake/Output, MAR and Recent Results.

## 2021-01-14 LAB
ANION GAP SERPL CALC-SCNC: 3 MMOL/L (ref 3–18)
BACTERIA SPEC CULT: ABNORMAL
BACTERIA SPEC CULT: NORMAL
BUN SERPL-MCNC: 7 MG/DL (ref 7–18)
BUN/CREAT SERPL: 12 (ref 12–20)
CALCIUM SERPL-MCNC: 9.1 MG/DL (ref 8.5–10.1)
CC UR VC: ABNORMAL
CHLORIDE SERPL-SCNC: 108 MMOL/L (ref 100–111)
CO2 SERPL-SCNC: 31 MMOL/L (ref 21–32)
CREAT SERPL-MCNC: 0.6 MG/DL (ref 0.6–1.3)
ERYTHROCYTE [DISTWIDTH] IN BLOOD BY AUTOMATED COUNT: 15.4 % (ref 11.6–14.5)
GLUCOSE SERPL-MCNC: 175 MG/DL (ref 74–99)
HCT VFR BLD AUTO: 34.8 % (ref 35–45)
HGB BLD-MCNC: 11.3 G/DL (ref 12–16)
MCH RBC QN AUTO: 30.3 PG (ref 24–34)
MCHC RBC AUTO-ENTMCNC: 32.5 G/DL (ref 31–37)
MCV RBC AUTO: 93.3 FL (ref 74–97)
PLATELET # BLD AUTO: 136 K/UL (ref 135–420)
PMV BLD AUTO: 9.8 FL (ref 9.2–11.8)
POTASSIUM SERPL-SCNC: 4.1 MMOL/L (ref 3.5–5.5)
RBC # BLD AUTO: 3.73 M/UL (ref 4.2–5.3)
SERVICE CMNT-IMP: ABNORMAL
SERVICE CMNT-IMP: NORMAL
SODIUM SERPL-SCNC: 142 MMOL/L (ref 136–145)
WBC # BLD AUTO: 4.9 K/UL (ref 4.6–13.2)

## 2021-01-14 PROCEDURE — 74011000250 HC RX REV CODE- 250: Performed by: INTERNAL MEDICINE

## 2021-01-14 PROCEDURE — 80048 BASIC METABOLIC PNL TOTAL CA: CPT

## 2021-01-14 PROCEDURE — 36415 COLL VENOUS BLD VENIPUNCTURE: CPT

## 2021-01-14 PROCEDURE — 74011250636 HC RX REV CODE- 250/636: Performed by: INTERNAL MEDICINE

## 2021-01-14 PROCEDURE — 74011250637 HC RX REV CODE- 250/637: Performed by: UROLOGY

## 2021-01-14 PROCEDURE — 65270000029 HC RM PRIVATE

## 2021-01-14 PROCEDURE — 85027 COMPLETE CBC AUTOMATED: CPT

## 2021-01-14 PROCEDURE — 2709999900 HC NON-CHARGEABLE SUPPLY

## 2021-01-14 PROCEDURE — 74011250636 HC RX REV CODE- 250/636: Performed by: UROLOGY

## 2021-01-14 RX ORDER — CIPROFLOXACIN 500 MG/1
500 TABLET ORAL 2 TIMES DAILY
Qty: 28 TAB | Refills: 0 | Status: SHIPPED | OUTPATIENT
Start: 2021-01-14 | End: 2021-01-28

## 2021-01-14 RX ADMIN — ACETAMINOPHEN 1000 MG: 325 TABLET ORAL at 05:55

## 2021-01-14 RX ADMIN — DOCUSATE SODIUM 100 MG: 100 CAPSULE, LIQUID FILLED ORAL at 10:43

## 2021-01-14 RX ADMIN — PREGABALIN 100 MG: 50 CAPSULE ORAL at 10:43

## 2021-01-14 RX ADMIN — AMITRIPTYLINE HYDROCHLORIDE 10 MG: 10 TABLET, FILM COATED ORAL at 21:46

## 2021-01-14 RX ADMIN — Medication 10 ML: at 15:02

## 2021-01-14 RX ADMIN — Medication 10 ML: at 21:48

## 2021-01-14 RX ADMIN — SODIUM CHLORIDE 125 ML/HR: 900 INJECTION, SOLUTION INTRAVENOUS at 05:51

## 2021-01-14 RX ADMIN — OXYCODONE HYDROCHLORIDE 5 MG: 5 TABLET ORAL at 15:09

## 2021-01-14 RX ADMIN — PREGABALIN 100 MG: 50 CAPSULE ORAL at 15:09

## 2021-01-14 RX ADMIN — Medication 10 ML: at 07:02

## 2021-01-14 RX ADMIN — CEFTRIAXONE SODIUM 2 G: 2 INJECTION, POWDER, FOR SOLUTION INTRAMUSCULAR; INTRAVENOUS at 14:57

## 2021-01-14 RX ADMIN — PREGABALIN 100 MG: 50 CAPSULE ORAL at 21:46

## 2021-01-14 RX ADMIN — ACETAMINOPHEN 1000 MG: 325 TABLET ORAL at 12:33

## 2021-01-14 NOTE — ROUTINE PROCESS
Received report from ARACELI Mccullough. Pt AAOx3, NAD, breathing non labored, on room air, HOB up. IV site clean, dry and intact. IVF going per order. butler cath in. Colostomy bag w/ small formed stool. Bed at the lowest level on lock position, call bell w/i reach.    Bedside and Verbal shift change report given to ARACELI Mena (oncoming nurse) by me (offgoing nurse). Report included the following information SBAR, Kardex, Procedure Summary, Intake/Output, MAR and Recent Results.

## 2021-01-14 NOTE — PROGRESS NOTES
Problem: Falls - Risk of  Goal: *Absence of Falls  Description: Document Courtney Stephenson Fall Risk and appropriate interventions in the flowsheet.   Outcome: Progressing Towards Goal  Note: Fall Risk Interventions:            Medication Interventions: Assess postural VS orthostatic hypotension, Bed/chair exit alarm, Evaluate medications/consider consulting pharmacy    Elimination Interventions: Bed/chair exit alarm, Call light in reach, Toileting schedule/hourly rounds              Problem: Patient Education: Go to Patient Education Activity  Goal: Patient/Family Education  Outcome: Progressing Towards Goal

## 2021-01-14 NOTE — DISCHARGE SUMMARY
Discharge Summary     Patient ID:  Yunior Hearn  [id number]  15 y.o.  1950    Admit Date: 1/11/2021    Discharge Date: 1/14/2021    * Admission Diagnoses: Urinary tract bacterial infections [N39.0, A49.9]; Kidney stone [N20.0]    * Discharge Diagnoses:    Hospital Problems as of 1/14/2021 Date Reviewed: 11/11/2020          Codes Class Noted - Resolved POA    Kidney stone ICD-10-CM: N20.0  ICD-9-CM: 592.0  1/12/2021 - Present Unknown        Urinary tract bacterial infections ICD-10-CM: N39.0, A49.9  ICD-9-CM: 599.0, 041.9  1/11/2021 - Present Unknown               Admission Condition: Good    * Discharge Condition: good    * Procedures: Procedure(s):  CYSTO, RETROGRADE/DOUBLE J STENT/C-ARM    * Hospital Course:   Normal hospital course for this procedure. Initially planned for PCNL, but found to have obstructing steinstrrasse and UTI upon arrival and repeat imaging and testing therefore underwent stent placement. Found to have Klebsiella sensitive to Cipro. Switched to oral cipro and discharged on Friday with plan to return Monday night for Surgery on Tuesday PCNL. Repeat culture negative.      Consults: Infectious disease     Significant Diagnostic Studies:      Labs: Results:   Chemistry    Recent Labs     01/14/21  0950 01/13/21  0508 01/11/21  1720   * 103* 117*    141 140   K 4.1 4.1 3.9    108 106   CO2 31 27 31   BUN 7 15 15   CREA 0.60 0.60 0.54*   CA 9.1 9.1 9.5   AGAP 3 6 3   BUCR 12 25* 28*      CBC w/Diff Recent Labs     01/14/21  0950 01/13/21  0508 01/11/21  1720   WBC 4.9 5.7 6.5   RBC 3.73* 3.90* 4.05*   HGB 11.3* 11.5* 12.2   HCT 34.8* 36.7 38.1    144 148      Cultures Recent Labs     01/12/21  1048 01/12/21  0801 01/12/21  0758   CULT No growth (<1,000 CFU/ML) No growth (<1,000 CFU/ML) No growth (<1,000 CFU/ML)     All Micro Results     Procedure Component Value Units Date/Time    CULTURE, URINE [[id number]] Collected: 01/12/21 1048    Order Status: Completed Specimen: Urine from Kidney Updated: 01/14/21 1409     Special Requests: NO SPECIAL REQUESTS        Culture result: No growth (<1,000 CFU/ML)       CULTURE, URINE [[id number]]  (Abnormal)  (Susceptibility) Collected: 01/11/21 1640    Order Status: Completed Specimen: Cath Urine Updated: 01/14/21 1039     Special Requests: NO SPECIAL REQUESTS        [place] Count --        >100,000  COLONIES/mL       Culture result: KLEBSIELLA PNEUMONIAE       CULTURE, URINE [[id number]] Collected: 01/12/21 0758    Order Status: Completed Specimen: Urine Updated: 01/13/21 2055     Special Requests: NO SPECIAL REQUESTS        Culture result: No growth (<1,000 CFU/ML)       CULTURE, URINE [[id number]] Collected: 01/12/21 0801    Order Status: Completed Specimen: Urine Updated: 01/13/21 1918     Special Requests: NO SPECIAL REQUESTS        Culture result: No growth (<1,000 CFU/ML)       CULTURE, URINE [[id number]] Collected: 01/12/21 0800    Order Status: Canceled Specimen: Urine from Bladder             Urinalysis Color   Date Value Ref Range Status   07/16/2018 YELLOW/STRAW   Final     Comment:     Color Reference Range: Straw, Yellow or Dark Yellow     Appearance   Date Value Ref Range Status   07/16/2018 CLOUDY (A) CLEAR   Final     Specific gravity   Date Value Ref Range Status   07/16/2018 1.011 1.003 - 1.030   Final     pH (UA)   Date Value Ref Range Status   07/16/2018 7.5 5.0 - 8.0   Final     Protein   Date Value Ref Range Status   07/16/2018 NEGATIVE  NEG mg/dL Final     Ketone   Date Value Ref Range Status   07/16/2018 NEGATIVE  NEG mg/dL Final     Bilirubin   Date Value Ref Range Status   07/16/2018 NEGATIVE  NEG   Final     Blood   Date Value Ref Range Status   07/16/2018 NEGATIVE  NEG   Final     Urobilinogen   Date Value Ref Range Status   07/16/2018 0.2 0.2 - 1.0 EU/dL Final     Nitrites   Date Value Ref Range Status   07/16/2018 NEGATIVE  NEG   Final     Leukocyte Esterase   Date Value Ref Range Status 07/16/2018 SMALL (A) NEG   Final     Potassium   Date Value Ref Range Status   01/14/2021 4.1 3.5 - 5.5 mmol/L Final     Creatinine   Date Value Ref Range Status   01/14/2021 0.60 0.6 - 1.3 MG/DL Final     BUN   Date Value Ref Range Status   01/14/2021 7 7.0 - 18 MG/DL Final      PSA No results for input(s): PSA in the last 72 hours. Coagulation No results found for: PTP, INR, APTT, INREXT, INREXT        * Disposition: Home    Discharge Medications:   Current Discharge Medication List      START taking these medications    Details   ciprofloxacin HCl (CIPRO) 500 mg tablet Take 1 Tab by mouth two (2) times a day for 14 days. Qty: 28 Tab, Refills: 0         CONTINUE these medications which have NOT CHANGED    Details   HYDROcodone-acetaminophen (NORCO) 5-325 mg per tablet Take  by mouth.      pregabalin (LYRICA) 100 mg capsule Take 1 Cap by mouth nightly. Max Daily Amount: 100 mg. Qty: 30 Cap, Refills: 2    Associated Diagnoses: Radicular pain of right lower back      acetaminophen (TYLENOL) 650 mg/20.3 mL solution Take  by mouth every six (6) hours as needed for Fever. ondansetron (ZOFRAN ODT) 4 mg disintegrating tablet Take 1 Tab by mouth every eight (8) hours as needed for Nausea. Qty: 90 Tab, Refills: 0      amitriptyline (ELAVIL) 10 mg tablet Take 1 Tab by mouth nightly. Qty: 90 Tab, Refills: 3      carboxymethylcellulose sodium (REFRESH TEARS) 0.5 % drop ophthalmic solution Administer 1 Drop to both eyes two (2) times a day. Qty: 30 mL, Refills: 11      loratadine (CLARITIN) 10 mg tablet Take 1 Tab by mouth daily as needed for Allergies. Qty: 90 Tab, Refills: 3      metoprolol tartrate (LOPRESSOR) 25 mg tablet Take 0.5 Tabs by mouth two (2) times a day. Qty: 90 Tab, Refills: 1    Associated Diagnoses: Essential hypertension      sennosides (Senna) 8.6 mg cap Take  by mouth. albuterol-ipratropium (DUO-NEB) 2.5 mg-0.5 mg/3 ml nebu 3 mL by Nebulization route as directed.       VITAMIN C 500 mg tablet Take 1 Tab by mouth daily. ELIQUIS 5 mg tablet Take 1 Tab by mouth daily. albuterol (PROVENTIL HFA, VENTOLIN HFA, PROAIR HFA) 90 mcg/actuation inhaler Take  by inhalation. hydrocortisone (PREPARATION H HYDROCORTISONE) 1 % topical cream Apply  to affected area two (2) times a day. use thin layer  Qty: 30 g, Refills: 6      cholecalciferol (VITAMIN D3) 1,000 unit tablet Take 1 Tab by mouth daily. Qty: 90 Tab, Refills: 3             * Follow-up Care/Patient Instructions: Activity: Activity as tolerated  Diet: Regular Diet  Wound Care: None needed  Wound care discussed with patient. Follow-up Information     Follow up With Specialties Details Why [address], [facility], [address]   [address] [place] [address]  [phone]            PCP:  Alireza Pacheco MD  Referring Provider: No ref. provider found    Follow up with: [facility] ON Tuesday 1/19.  CONTINUE CIPRO    Signed:  Evelyn Prasad MD    [phone]    January 14, 2021 5:10 PM

## 2021-01-14 NOTE — PROGRESS NOTES
1610- Per pt, she was told she was leaving today and coming back Monday afternoon for her procedure Tuesday morning. Spoke with Dr Hieu Castillo who states she spoke with Dr Jagdeep Willard about discharging today to reduce patients risk of covid exposure. Sent Dr Jagdeep Willard a text message in [facility]. Called office and spoke with Lucie Ghotra (sp?) who states she will contact the doctor and ask him to call me.    [phone]- have not heard from Dr Jagdeep Willard. Will assume at this point that discharge will be deferred until tomorrow as pt will need stretcher transport and will be challenging to set up at this late time. Spoke with Dr Jagdeep Willard. Plan is for discharge in the morning to ensure patient has scripts for her antibiotics. Pt and MD aware. Pt will contact her  to update him.       Arnulfo Pastor RN - Outcomes Manager  [phone]

## 2021-01-14 NOTE — PROGRESS NOTES
Reason for Admission:  Urinary tract bacterial infections [N39.0, A49.9]  Kidney stone [N20.0]                 RUR Score:    11%            Plan for utilizing home health:    no                      Likelihood of Readmission:   LOW                         Transition of Care Plan:              Initial assessment completed with patient. Cognitive status of patient: oriented to time, place, person and situation. Face sheet information confirmed:  yes. The patient designates  to participate in her discharge plan and to receive any needed information. This patient lives in a single family home with spouse. Patient is not able to navigate steps as needed. Prior to hospitalization, patient was considered to be independent with ADLs/IADLS : no . If not independent,  patient needs assist with : dressing, bathing, food preparation, cooking and toileting    Patient has a current ACP document on file: no but states her  has POA. The patient will need stretcher transport home upon discharge. The patient already has W/C, Electric W/C, [employer], and shower chair available in the home. Patient is not currently active with home health but does get personal care aides for 8hrs/day mon-sat and 4hrs on Sunday through [address] [place] [address]. Patient has not stayed in a skilled nursing facility or rehab. This patient is on dialysis :no    Currently, the discharge plan is Home. The patient states that she can obtain her medications from the pharmacy, and take her medications as directed. Patient's current insurance is Medicare,  and [employer]. Care Management Interventions  PCP Verified by CM:  Yes  Mode of Transport at Discharge: BLS  Transition of Care Consult (CM Consult): Discharge Planning  Current Support Network: Lives with Spouse  Confirm Follow Up Transport: Family  Discharge Location  Discharge Placement: Home        Gilberto Montgomery RN - Outcomes Manager [phone]

## 2021-01-14 NOTE — PROGRESS NOTES
Problem: Falls - Risk of  Goal: *Absence of Falls  Description: Document Starleen Freeze Fall Risk and appropriate interventions in the flowsheet. Outcome: Progressing Towards Goal  Note: Fall Risk Interventions:            Medication Interventions: Assess postural VS orthostatic hypotension, Bed/chair exit alarm, Evaluate medications/consider consulting pharmacy    Elimination Interventions: Bed/chair exit alarm, Call light in reach, Toileting schedule/hourly rounds              Problem: Patient Education: Go to Patient Education Activity  Goal: Patient/Family Education  Outcome: Progressing Towards Goal     Problem: Pressure Injury - Risk of  Goal: *Prevention of pressure injury  Description: Document Orlando Scale and appropriate interventions in the flowsheet. Outcome: Progressing Towards Goal  Note: Pressure Injury Interventions:  Sensory Interventions: Assess changes in LOC, Float heels, Keep linens dry and wrinkle-free, Maintain/enhance activity level, Minimize linen layers, Monitor skin under medical devices, Pad between skin to skin, Pressure redistribution bed/mattress (bed type), Turn and reposition approx. every two hours (pillows and wedges if needed)    Moisture Interventions: Absorbent underpads, Assess need for specialty bed, Check for incontinence Q2 hours and as needed, Internal/External urinary devices, Limit adult briefs, Maintain skin hydration (lotion/cream), Minimize layers, Offer toileting Q_hr    Activity Interventions: Pressure redistribution bed/mattress(bed type)    Mobility Interventions: Assess need for specialty bed, Float heels, HOB 30 degrees or less, Pressure redistribution bed/mattress (bed type), Turn and reposition approx.  every two hours(pillow and wedges)    Nutrition Interventions: Document food/fluid/supplement intake    Friction and Shear Interventions: Apply protective barrier, creams and emollients, Foam dressings/transparent film/skin sealants, HOB 30 degrees or less, Lift sheet, Lift team/patient mobility team, Minimize layers, Transferring/repositioning devices                Problem: Patient Education: Go to Patient Education Activity  Goal: Patient/Family Education  Outcome: Progressing Towards Goal     Problem: Nutrition Deficit  Goal: *Optimize nutritional status  Outcome: Progressing Towards Goal     Problem: Kidney [address] (Adult)  Goal: *Elimination of kidney stone(s)  Outcome: Progressing Towards Goal  Goal: *Control of acute pain  Outcome: Progressing Towards Goal     Problem: Patient Education: Go to Patient Education Activity  Goal: Patient/Family Education  Outcome: Progressing Towards Goal     Problem: Urinary Tract Infection - Adult  Goal: *Absence of infection signs and symptoms  Outcome: Progressing Towards Goal     Problem: Patient Education: Go to Patient Education Activity  Goal: Patient/Family Education  Outcome: Progressing Towards Goal     Problem: Ostomy Care  Goal: *Patient pouching appliance will fit properly and maintain integrity at least three to five days  Description: Infection control procedures (eg, clean dressings, clean gloves, hand washing, precautions to isolate wound from contamination, sterile instruments used for wound debridement) should be implemented. Outcome: Progressing Towards Goal  Goal: *Acceptance of change in body image  Outcome: Progressing Towards Goal     Problem: Patient Education: Go to Patient Education Activity  Goal: Patient/Family Education  Outcome: Progressing Towards Goal     Problem: Patient Education: Go to Patient Education Activity  Goal: Patient/Family Education  Outcome: Progressing Towards Goal     Problem: Impaired Skin Integrity/Pressure Injury Treatment  Goal: *Improvement of Existing Pressure Injury  Outcome: Progressing Towards Goal  Goal: *Prevention of pressure injury  Description: Document Orlando Scale and appropriate interventions in the flowsheet.   Outcome: Progressing Towards Goal     Problem: Patient Education: Go to Patient Education Activity  Goal: Patient/Family Education  Outcome: Progressing Towards Goal

## 2021-01-14 NOTE — PROGRESS NOTES
Infectious Disease progress Note        Reason: Complicated UTI, cystitis-history of vancomycin-resistant Enterococcus UTI, Klebsiella UTI    Current abx Prior abx   Ceftriaxone since   daptomycin since  Fosfomycin on   Meropenem on      Lines:       Assessment :      73 y/o woman, spinal cord injury, neurogenic bladder with multiple initially thought to be intrarenal stones on CT in 2020 admitted to [facility] on 2021 for PCNL. Urine culture 2020-Aerococcus, yeast  Urine culture 2020-Klebsiella pneumoniae, gram-positive cocci  Urine culture 2020-Klebsiella, VRE  Urine culture 2021-50,000 colonies of Klebsiella, mixed culture  Urine culture 2020-greater than 100,000 colonies of gram-negative nabor    Now in need for urological intervention    Clinical presentation consistent with complicated cystitis secondary to Klebsiella pneumoniae    Status post cystoscopy, stent placement on 2021. Significant bladder inflammation noted intraoperatively consistent with cystitis    Urine culture  greater than 100,000 colonies of gram-negative rods    Plans for further urological intervention noted. Will need to give effective antibiotics to eradicate bacteriuria prior to urological procedure. Antibiotic management complicated due to history of linezolid allergy. Patient states that she developed numbness of her whole body when she took linezolid in 2020. She refuses to take trial of linezolid since she felt that she almost  when she took it. Recommendations:    1. D/c ceftriaxone, switch patient to p.o. ciprofloxacin x 5 days  2. Timing of PCNL per urologist  3. Discharge planning per urology    Will sign off.f/u prn. thanks    . Above plan was discussed in details with patient, dr. Alla Dee, . Please call me if any further questions or concerns. Lee Ann Mike HPI:    Feels better.   No new complaint      home Medication List    Details HYDROcodone-acetaminophen (NORCO) 5-325 mg per tablet Take  by mouth.      pregabalin (LYRICA) 100 mg capsule Take 1 Cap by mouth nightly. Max Daily Amount: 100 mg. Qty: 30 Cap, Refills: 2    Associated Diagnoses: Radicular pain of right lower back      acetaminophen (TYLENOL) 650 mg/20.3 mL solution Take  by mouth every six (6) hours as needed for Fever. ondansetron (ZOFRAN ODT) 4 mg disintegrating tablet Take 1 Tab by mouth every eight (8) hours as needed for Nausea. Qty: 90 Tab, Refills: 0      amitriptyline (ELAVIL) 10 mg tablet Take 1 Tab by mouth nightly. Qty: 90 Tab, Refills: 3      carboxymethylcellulose sodium (REFRESH TEARS) 0.5 % drop ophthalmic solution Administer 1 Drop to both eyes two (2) times a day. Qty: 30 mL, Refills: 11      loratadine (CLARITIN) 10 mg tablet Take 1 Tab by mouth daily as needed for Allergies. Qty: 90 Tab, Refills: 3      metoprolol tartrate (LOPRESSOR) 25 mg tablet Take 0.5 Tabs by mouth two (2) times a day. Qty: 90 Tab, Refills: 1    Associated Diagnoses: Essential hypertension      sennosides (Senna) 8.6 mg cap Take  by mouth. albuterol-ipratropium (DUO-NEB) 2.5 mg-0.5 mg/3 ml nebu 3 mL by Nebulization route as directed. VITAMIN C 500 mg tablet Take 1 Tab by mouth daily. ELIQUIS 5 mg tablet Take 1 Tab by mouth daily. albuterol (PROVENTIL HFA, VENTOLIN HFA, PROAIR HFA) 90 mcg/actuation inhaler Take  by inhalation. hydrocortisone (PREPARATION H HYDROCORTISONE) 1 % topical cream Apply  to affected area two (2) times a day. use thin layer  Qty: 30 g, Refills: 6      cholecalciferol (VITAMIN D3) 1,000 unit tablet Take 1 Tab by mouth daily.   Qty: 90 Tab, Refills: 3          Current Facility-Administered Medications   Medication Dose Route Frequency    0.9% sodium chloride infusion  125 mL/hr IntraVENous CONTINUOUS    sodium chloride (NS) flush 5-40 mL  5-40 mL IntraVENous Q8H    sodium chloride (NS) flush 5-40 mL  5-40 mL IntraVENous PRN    acetaminophen (TYLENOL) tablet 1,000 mg  1,000 mg Oral Q6H    oxyCODONE IR (ROXICODONE) tablet 5 mg  5 mg Oral Q4H PRN    HYDROmorphone (DILAUDID) injection 1 mg  1 mg IntraVENous Q4H PRN    naloxone (NARCAN) injection 0.4 mg  0.4 mg IntraVENous PRN    ondansetron (ZOFRAN) injection 4 mg  4 mg IntraVENous Q4H PRN    diphenhydrAMINE (BENADRYL) injection 12.5 mg  12.5 mg IntraVENous Q4H PRN    docusate sodium (COLACE) capsule 100 mg  100 mg Oral BID    pregabalin (LYRICA) capsule 100 mg  100 mg Oral TID    cefTRIAXone (ROCEPHIN) 2 g in sterile water (preservative free) 20 mL IV syringe  2 g IntraVENous Q24H    amitriptyline (ELAVIL) tablet 10 mg  10 mg Oral QHS       Allergies: Peanut, Zyvox [linezolid], Allopurinol, Norvasc [amlodipine], Chicken derived, Neurontin [gabapentin], Prednisone, and [place] [poultry]    Temp (24hrs), Av.3 °F (36.8 °C), Min:98.1 °F (36.7 °C), Max:98.4 °F (36.9 °C)    Visit Vitals  BP (!) 169/84   Pulse 93   Temp 98.4 °F (36.9 °C)   Resp 16   Ht 5' 2\" (1.575 m)   Wt 74.8 kg (165 lb)   SpO2 93%   Breastfeeding No   BMI 30.18 kg/m²       ROS: 12 point ROS obtained in details. Pertinent positives as mentioned in HPI,   otherwise negative    Physical Exam:    General:  female laying on the bed  bed AAOx3 in no acute distress. General:   awake alert and oriented   HEENT:  Normocephalic, atraumatic, PERRL, EOMI, no scleral icterus or pallor; no conjunctival hemmohage;  nasal and oral mucous are moist and without evidence of lesions. No thrush. Neck supple, no bruits. Lymph Nodes:   no cervical, axillary or inguinal adenopathy   Lungs:   non-labored, bilaterally clear to auscultation- no crackles wheezes rales or rhonchi   Heart:  RRR, s1 and s2; no  rubs or gallops, no edema, + pedal pulses   Abdomen:  soft, non-distended, active bowel sounds, no hepatomegaly, no splenomegaly. Non-tender   Genitourinary:   King in place   Extremities:   no clubbing, cyanosis; no joint effusions or swelling; Full ROM of all large joints to the upper and lower extremities; muscle mass appropriate for age   Neurologic:  No gross focal sensory abnormalities; 5/5 muscle strength to upper and lower extremities. Speech appropriate.  Cranial nerves intact                        Skin:   Healed scar left flank   Back:  no spinal or paraspinal muscle tenderness or rigidity, no CVA tenderness     Psychiatric:  No suicidal or homicidal ideations, appropriate mood and affect         Labs: Results:   Chemistry Recent Labs     01/14/21  0950 01/13/21  0508 01/11/21  1720   * 103* 117*    141 140   K 4.1 4.1 3.9    108 106   CO2 31 27 31   BUN 7 15 15   CREA 0.60 0.60 0.54*   CA 9.1 9.1 9.5   AGAP 3 6 3   BUCR 12 25* 28*      CBC w/Diff Recent Labs     01/14/21  0950 01/13/21  0508 01/11/21  1720   WBC 4.9 5.7 6.5   RBC 3.73* 3.90* 4.05*   HGB 11.3* 11.5* 12.2   HCT 34.8* 36.7 38.1    144 148      Microbiology Recent Labs     01/12/21  0801 01/12/21  0758 01/11/21  1640   CULT No growth (<1,000 CFU/ML) No growth (<1,000 CFU/ML) KLEBSIELLA PNEUMONIAE*          RADIOLOGY:    All available imaging studies/reports in [facility] for this admission were reviewed    Dr. Dieter Trevino, Infectious Disease Specialist  [phone]  January 14, 2021  10:52 AM

## 2021-01-14 NOTE — PROGRESS NOTES
conducted an initial consultation and Spiritual Assessment for Kizzy Adkins, who is a 72 y.o.,female. Patient's Primary Language is: [place]. According to the patient's EMR Caodaism Affiliation is: Other. The reason the Patient came to the hospital is:   Patient Active Problem List    Diagnosis Date Noted    Kidney stone 01/12/2021    Urinary tract bacterial infections 01/11/2021    Wound infection 05/21/2020    MRSA (methicillin resistant staph aureus) culture positive 05/20/2020    MARQUITA (acute kidney injury) ([address] 04/06/2020    Hyperkalemia 04/06/2020    Leukocytosis 04/06/2020    UTI (urinary tract infection) 04/06/2020    Fever 05/02/2019    Skin ulcer of sacrum, limited to breakdown of skin ([address] 05/02/2019    Colostomy present ([address] 02/27/2019    Other pulmonary embolism without acute cor pulmonale ([address] 02/13/2019    Subacute osteomyelitis, other site ([address] 12/19/2018    History of Clostridium difficile colitis 12/19/2018    Acute cystitis without hematuria 12/06/2018    Acute hyponatremia 11/19/2018    Anemia in other chronic diseases classified elsewhere 11/19/2018    History of MRSA infection 10/19/2018    Urinary retention 10/19/2018    Decubitus ulcer of coccygeal region, stage 3 ([address] 10/18/2018    Pressure injury of sacral region, stage 4 ([address] 09/13/2018    Full incontinence of feces 09/13/2018    Paraplegia ([address] 07/31/2018    Thoracic spinal cord injury ([address] 05/29/2018    Lumbar radicular pain 03/06/2018    Calculus of gallbladder without cholecystitis without obstruction 02/27/2018    Hepatic cyst 02/27/2018    Vitamin D deficiency 12/04/2017    Essential hypertension 12/01/2017    Elevated blood sugar 12/01/2017    Transaminitis 12/01/2017        The  provided the following Interventions:  Initiated a relationship of care and support. Explored issues of nuris, belief. Listened empathetically.  Patient asim through her nuris in God and her family. Provided chaplaincy education. Offered prayer and assurance of continued prayers on patient's behalf. Chart reviewed. The following outcomes where achieved:  Patient shared limited information about both their medical narrative and spiritual beliefs. Patient processed feeling about current hospitalization. Patient expressed gratitude for 's visit. Assessment:  Patient does not have any known Uatsdin/cultural needs that will affect patient's preferences in health care. There are no known spiritual or Uatsdin issues which require intervention at this time. Plan:  Chaplains will continue to follow and will provide pastoral care as needed and as requested.  recommends bedside caregivers page the  on duty if patient shows signs of spiritual or emotional distress. [address]  [address]   [phone]

## 2021-01-14 NOTE — PROGRESS NOTES
Discussed with case management, ID and nursing    Oral Abx is good option  Barrier is transportation  Patient safe for D/C tomorrow Am and plan for return to OR on Tuesday AM for PCNL  Given need for stretched transport, consider admitting Monday NIGHT    Scripts sent  Summary signed   Orders placed

## 2021-01-14 NOTE — PROGRESS NOTES
Urology Progress Note        Assessment/Plan:     Patient Active Problem List   Diagnosis Code    Essential hypertension I10    Elevated blood sugar R73.9    Transaminitis R74.01    Vitamin D deficiency E55.9    Calculus of gallbladder without cholecystitis without obstruction K80.20    Hepatic cyst K76.89    Lumbar radicular pain M54.16    Thoracic spinal cord injury ([address] S24.109A    Paraplegia ([address] G82.20    Pressure injury of sacral region, stage 4 ([address] L89.154    Full incontinence of feces R15.9    Decubitus ulcer of coccygeal region, stage 3 ([address] L89.153    History of MRSA infection Z86.14    Urinary retention R33.9    Subacute osteomyelitis, other site ([address] M86.28    History of Clostridium difficile colitis Z86.19    Other pulmonary embolism without acute cor pulmonale (HCC) I26.99    Acute cystitis without hematuria N30.00    Acute hyponatremia E87.1    Anemia in other chronic diseases classified elsewhere D63.8    Colostomy present ([address] Z93.3    MARQUITA (acute kidney injury) ([address] N17.9    Fever R50.9    Hyperkalemia E87.5    Leukocytosis D72.829    MRSA (methicillin resistant staph aureus) culture positive Z22.322    Skin ulcer of sacrum, limited to breakdown of skin ([address] L98.421    UTI (urinary tract infection) N39.0    Wound infection T14. 8XXA, L08.9    Urinary tract bacterial infections N39.0, A49.9    Kidney stone N20.0   Assessment:    #1 Obstructing proximal right ureteral stones and additional right infectious nephrolithiasis    S/p cysto, right RPG, right ureteral stent placement on 1/12 by Dr. Silvia Ramirez   Pre-op culture 1/11: 100k cfu/mL GNR   UCx 1/12/21 no growth   WBC 5.7 < 6.5 on admission   Cr 0.60    #2 Neurogenic bladder with chronic butler      Plan:  Appreciate ID input  Abx per ID- on Ceftriaxone   Obtain repeat labs today, orders placed. Will plan to keep her inpatient until PCNL on Tuesday 1/19 first case AM, Dr. Silvia Ramirez.    Following closely     Bhavin Finney DESHAUN Bill       Subjective:     Daily Progress Note: 2021 8:26 AM    Job Arvind is doing well. Afebrile, VSS. She reports pain is absent. She has no new complaints. She is tolerating a solid diet. Tolerating stent and butler well. Butler draining light yellow urine. Had BM earlier this morning. Objective:     Visit Vitals  /80 (BP 1 Location: Left arm, BP Patient Position: At rest)   Pulse 87   Temp 98.1 °F (36.7 °C)   Resp 16   Ht 5' 2\" (1.575 m)   Wt 165 lb (74.8 kg)   SpO2 98%   Breastfeeding No   BMI 30.18 kg/m²        Temp (24hrs), Av.2 °F (36.8 °C), Min:98 °F (36.7 °C), Max:98.3 °F (36.8 °C)      Intake and Output:   1901 -  0700  In: 0870 [P.O.:440; I.V.:3000]  Out: 7850 [Urine:7850]  No intake/output data recorded. PHYSICAL EXAMINATION:   Visit Vitals  /80 (BP 1 Location: Left arm, BP Patient Position: At rest)   Pulse 87   Temp 98.1 °F (36.7 °C)   Resp 16   Ht 5' 2\" (1.575 m)   Wt 165 lb (74.8 kg)   SpO2 98%   Breastfeeding No   BMI 30.18 kg/m²     Constitutional: Well developed, well-nourished female in no acute distress. HEENT: Normocephalic, Atraumatic   CV:   no edema  Respiratory: No respiratory distress or difficulties breathing   Abdomen:  Soft and nontender.  Female: Butler draining light yellow urine   Skin:  Normal color. No evidence of jaundice. Neuro/Psych:  Patient with appropriate affect. Alert and oriented. Lab/Data Review: All lab results for the last 24 hours reviewed.     Labs:     Labs: Results:   Chemistry    Recent Labs     21  0508 21  1720   * 117*    140   K 4.1 3.9    106   CO2 27 31   BUN 15 15   CREA 0.60 0.54*   CA 9.1 9.5   AGAP 6 3   BUCR 25* 28*      CBC w/Diff Recent Labs     21  0508 21  1720   WBC 5.7 6.5   RBC 3.90* 4.05*   HGB 11.5* 12.2   HCT 36.7 38.1    148      Cultures Recent Labs     21  0801 21  0758 21  1640   CULT No growth (<1,000 CFU/ML) No growth (<1,000 CFU/ML) GRAM NEGATIVE RODS*     All Micro Results     Procedure Component Value Units Date/Time    CULTURE, URINE [[id number]] Collected: 01/12/21 0758    Order Status: Completed Specimen: Urine Updated: 01/13/21 2055     Special Requests: NO SPECIAL REQUESTS        Culture result: No growth (<1,000 CFU/ML)       CULTURE, URINE [[id number]] Collected: 01/12/21 0801    Order Status: Completed Specimen: Urine Updated: 01/13/21 1918     Special Requests: NO SPECIAL REQUESTS        Culture result: No growth (<1,000 CFU/ML)       CULTURE, URINE [[id number]] Collected: 01/12/21 1048    Order Status: Completed Specimen: Urine from Kidney Updated: 01/13/21 1340    CULTURE, URINE [[id number]]  (Abnormal) Collected: 01/11/21 1640    Order Status: Completed Specimen: Cath Urine Updated: 01/12/21 1233     Special Requests: NO SPECIAL REQUESTS        [place] Count --        >100,000  COLONIES/mL       Culture result: GRAM NEGATIVE RODS       CULTURE, URINE [[id number]] Collected: 01/12/21 0800    Order Status: Canceled Specimen: Urine from Bladder             Urinalysis Color   Date Value Ref Range Status   07/16/2018 YELLOW/STRAW   Final     Comment:     Color Reference Range: Straw, Yellow or Dark Yellow     Appearance   Date Value Ref Range Status   07/16/2018 CLOUDY (A) CLEAR   Final     Specific gravity   Date Value Ref Range Status   07/16/2018 1.011 1.003 - 1.030   Final     pH (UA)   Date Value Ref Range Status   07/16/2018 7.5 5.0 - 8.0   Final     Protein   Date Value Ref Range Status   07/16/2018 NEGATIVE  NEG mg/dL Final     Ketone   Date Value Ref Range Status   07/16/2018 NEGATIVE  NEG mg/dL Final     Bilirubin   Date Value Ref Range Status   07/16/2018 NEGATIVE  NEG   Final     Blood   Date Value Ref Range Status   07/16/2018 NEGATIVE  NEG   Final     Urobilinogen   Date Value Ref Range Status   07/16/2018 0.2 0.2 - 1.0 EU/dL Final     Nitrites   Date Value Ref Range Status   07/16/2018 NEGATIVE  NEG   Final     Leukocyte Esterase   Date Value Ref Range Status   07/16/2018 SMALL (A) NEG   Final     Potassium   Date Value Ref Range Status   01/13/2021 4.1 3.5 - 5.5 mmol/L Final     Creatinine   Date Value Ref Range Status   01/13/2021 0.60 0.6 - 1.3 MG/DL Final     BUN   Date Value Ref Range Status   01/13/2021 15 7.0 - 18 MG/DL Final      PSA No results for input(s): PSA in the last 72 hours.    Coagulation No results found for: PTP, INR, APTT, INREXT, INREXT

## 2021-01-15 VITALS
HEIGHT: 62 IN | OXYGEN SATURATION: 94 % | HEART RATE: 92 BPM | WEIGHT: 165 LBS | SYSTOLIC BLOOD PRESSURE: 130 MMHG | DIASTOLIC BLOOD PRESSURE: 77 MMHG | BODY MASS INDEX: 30.36 KG/M2 | RESPIRATION RATE: 18 BRPM | TEMPERATURE: 98.5 F

## 2021-01-15 LAB
ANION GAP SERPL CALC-SCNC: 5 MMOL/L (ref 3–18)
BUN SERPL-MCNC: 10 MG/DL (ref 7–18)
BUN/CREAT SERPL: 19 (ref 12–20)
CALCIUM SERPL-MCNC: 9.4 MG/DL (ref 8.5–10.1)
CHLORIDE SERPL-SCNC: 104 MMOL/L (ref 100–111)
CO2 SERPL-SCNC: 29 MMOL/L (ref 21–32)
CREAT SERPL-MCNC: 0.53 MG/DL (ref 0.6–1.3)
ERYTHROCYTE [DISTWIDTH] IN BLOOD BY AUTOMATED COUNT: 15.3 % (ref 11.6–14.5)
GLUCOSE SERPL-MCNC: 114 MG/DL (ref 74–99)
HCT VFR BLD AUTO: 36.6 % (ref 35–45)
HGB BLD-MCNC: 11.6 G/DL (ref 12–16)
MCH RBC QN AUTO: 29.5 PG (ref 24–34)
MCHC RBC AUTO-ENTMCNC: 31.7 G/DL (ref 31–37)
MCV RBC AUTO: 93.1 FL (ref 74–97)
PLATELET # BLD AUTO: 157 K/UL (ref 135–420)
PMV BLD AUTO: 9.5 FL (ref 9.2–11.8)
POTASSIUM SERPL-SCNC: 3.9 MMOL/L (ref 3.5–5.5)
RBC # BLD AUTO: 3.93 M/UL (ref 4.2–5.3)
SODIUM SERPL-SCNC: 138 MMOL/L (ref 136–145)
WBC # BLD AUTO: 6.8 K/UL (ref 4.6–13.2)

## 2021-01-15 PROCEDURE — 85027 COMPLETE CBC AUTOMATED: CPT

## 2021-01-15 PROCEDURE — 2709999900 HC NON-CHARGEABLE SUPPLY

## 2021-01-15 PROCEDURE — 74011250637 HC RX REV CODE- 250/637: Performed by: UROLOGY

## 2021-01-15 PROCEDURE — 80048 BASIC METABOLIC PNL TOTAL CA: CPT

## 2021-01-15 PROCEDURE — 77030013076 HC PCH OST BAG COLO -A

## 2021-01-15 PROCEDURE — 36415 COLL VENOUS BLD VENIPUNCTURE: CPT

## 2021-01-15 RX ADMIN — ACETAMINOPHEN 1000 MG: 325 TABLET ORAL at 12:03

## 2021-01-15 RX ADMIN — Medication 10 ML: at 05:52

## 2021-01-15 RX ADMIN — PREGABALIN 100 MG: 50 CAPSULE ORAL at 08:54

## 2021-01-15 RX ADMIN — OXYCODONE HYDROCHLORIDE 5 MG: 5 TABLET ORAL at 09:00

## 2021-01-15 RX ADMIN — ACETAMINOPHEN 1000 MG: 325 TABLET ORAL at 01:11

## 2021-01-15 RX ADMIN — ACETAMINOPHEN 1000 MG: 325 TABLET ORAL at 06:09

## 2021-01-15 NOTE — ROUTINE PROCESS
I have reviewed discharge instructions with the patient. The patient verbalized understanding. Discharge medications reviewed with patient and appropriate educational materials and side effects teaching were provided. Current Discharge Medication List      START taking these medications    Details   ciprofloxacin HCl (CIPRO) 500 mg tablet Take 1 Tab by mouth two (2) times a day for 14 days. Qty: 28 Tab, Refills: 0         CONTINUE these medications which have NOT CHANGED    Details   HYDROcodone-acetaminophen (NORCO) 5-325 mg per tablet Take  by mouth.      pregabalin (LYRICA) 100 mg capsule Take 1 Cap by mouth nightly. Max Daily Amount: 100 mg. Qty: 30 Cap, Refills: 2    Associated Diagnoses: Radicular pain of right lower back      acetaminophen (TYLENOL) 650 mg/20.3 mL solution Take  by mouth every six (6) hours as needed for Fever. ondansetron (ZOFRAN ODT) 4 mg disintegrating tablet Take 1 Tab by mouth every eight (8) hours as needed for Nausea. Qty: 90 Tab, Refills: 0      amitriptyline (ELAVIL) 10 mg tablet Take 1 Tab by mouth nightly. Qty: 90 Tab, Refills: 3      carboxymethylcellulose sodium (REFRESH TEARS) 0.5 % drop ophthalmic solution Administer 1 Drop to both eyes two (2) times a day. Qty: 30 mL, Refills: 11      loratadine (CLARITIN) 10 mg tablet Take 1 Tab by mouth daily as needed for Allergies. Qty: 90 Tab, Refills: 3      metoprolol tartrate (LOPRESSOR) 25 mg tablet Take 0.5 Tabs by mouth two (2) times a day. Qty: 90 Tab, Refills: 1    Associated Diagnoses: Essential hypertension      sennosides (Senna) 8.6 mg cap Take  by mouth. albuterol-ipratropium (DUO-NEB) 2.5 mg-0.5 mg/3 ml nebu 3 mL by Nebulization route as directed. VITAMIN C 500 mg tablet Take 1 Tab by mouth daily. ELIQUIS 5 mg tablet Take 1 Tab by mouth daily. albuterol (PROVENTIL HFA, VENTOLIN HFA, PROAIR HFA) 90 mcg/actuation inhaler Take  by inhalation.       hydrocortisone (PREPARATION H HYDROCORTISONE) 1 % topical cream Apply  to affected area two (2) times a day. use thin layer  Qty: 30 g, Refills: 6      cholecalciferol (VITAMIN D3) 1,000 unit tablet Take 1 Tab by mouth daily. Qty: 90 Tab, Refills: 3           Patient armband removed and shredded.

## 2021-01-15 NOTE — PROGRESS NOTES
Problem: Falls - Risk of  Goal: *Absence of Falls  Description: Document Dick Sun Fall Risk and appropriate interventions in the flowsheet. Outcome: Progressing Towards Goal  Note: Fall Risk Interventions:            Medication Interventions: Assess postural VS orthostatic hypotension, Bed/chair exit alarm, Evaluate medications/consider consulting pharmacy    Elimination Interventions: Bed/chair exit alarm, Call light in reach, Patient to call for help with toileting needs, Toilet paper/wipes in reach, Toileting schedule/hourly rounds              Problem: Patient Education: Go to Patient Education Activity  Goal: Patient/Family Education  Outcome: Progressing Towards Goal     Problem: Pressure Injury - Risk of  Goal: *Prevention of pressure injury  Description: Document [place] Scale and appropriate interventions in the flowsheet. Outcome: Progressing Towards Goal  Note: Pressure Injury Interventions:  Sensory Interventions: Assess changes in LOC, Assess need for specialty bed, Float heels, Keep linens dry and wrinkle-free, Maintain/enhance activity level, Minimize linen layers, Monitor skin under medical devices, Pad between skin to skin, Pressure redistribution bed/mattress (bed type), Turn and reposition approx.  every two hours (pillows and wedges if needed)    Moisture Interventions: Absorbent underpads, Assess need for specialty bed, Internal/External urinary devices, Limit adult briefs, Maintain skin hydration (lotion/cream), Minimize layers, Offer toileting Q_hr    Activity Interventions: Pressure redistribution bed/mattress(bed type)    Mobility Interventions: HOB 30 degrees or less, Assess need for specialty bed, Pressure redistribution bed/mattress (bed type)    Nutrition Interventions: Document food/fluid/supplement intake    Friction and Shear Interventions: Apply protective barrier, creams and emollients, Foam dressings/transparent film/skin sealants, Feet elevated on foot rest, HOB 30 degrees or less, Lift sheet, Lift team/patient mobility team, Minimize layers, Transferring/repositioning devices                Problem: Patient Education: Go to Patient Education Activity  Goal: Patient/Family Education  Outcome: Progressing Towards Goal     Problem: Nutrition Deficit  Goal: *Optimize nutritional status  Outcome: Progressing Towards Goal     Problem: Kidney [address] (Adult)  Goal: *Elimination of kidney stone(s)  Outcome: Progressing Towards Goal  Goal: *Control of acute pain  Outcome: Progressing Towards Goal     Problem: Patient Education: Go to Patient Education Activity  Goal: Patient/Family Education  Outcome: Progressing Towards Goal     Problem: Urinary Tract Infection - Adult  Goal: *Absence of infection signs and symptoms  Outcome: Progressing Towards Goal     Problem: Patient Education: Go to Patient Education Activity  Goal: Patient/Family Education  Outcome: Progressing Towards Goal     Problem: Ostomy Care  Goal: *Patient pouching appliance will fit properly and maintain integrity at least three to five days  Description: Infection control procedures (eg, clean dressings, clean gloves, hand washing, precautions to isolate wound from contamination, sterile instruments used for wound debridement) should be implemented. Outcome: Progressing Towards Goal  Goal: *Acceptance of change in body image  Outcome: Progressing Towards Goal     Problem: Patient Education: Go to Patient Education Activity  Goal: Patient/Family Education  Outcome: Progressing Towards Goal     Problem: Patient Education: Go to Patient Education Activity  Goal: Patient/Family Education  Outcome: Progressing Towards Goal     Problem: Impaired Skin Integrity/Pressure Injury Treatment  Goal: *Improvement of Existing Pressure Injury  Outcome: Progressing Towards Goal  Goal: *Prevention of pressure injury  Description: Document [place] Scale and appropriate interventions in the flowsheet.   Outcome: Progressing Towards Goal  Note: Pressure Injury Interventions:  Sensory Interventions: Assess changes in LOC, Assess need for specialty bed, Float heels, Keep linens dry and wrinkle-free, Maintain/enhance activity level, Minimize linen layers, Monitor skin under medical devices, Pad between skin to skin, Pressure redistribution bed/mattress (bed type), Turn and reposition approx.  every two hours (pillows and wedges if needed)    Moisture Interventions: Absorbent underpads, Assess need for specialty bed, Internal/External urinary devices, Limit adult briefs, Maintain skin hydration (lotion/cream), Minimize layers, Offer toileting Q_hr    Activity Interventions: Pressure redistribution bed/mattress(bed type)    Mobility Interventions: HOB 30 degrees or less, Assess need for specialty bed, Pressure redistribution bed/mattress (bed type)    Nutrition Interventions: Document food/fluid/supplement intake    Friction and Shear Interventions: Apply protective barrier, creams and emollients, Foam dressings/transparent film/skin sealants, Feet elevated on foot rest, HOB 30 degrees or less, Lift sheet, Lift team/patient mobility team, Minimize layers, Transferring/repositioning devices                Problem: Patient Education: Go to Patient Education Activity  Goal: Patient/Family Education  Outcome: Progressing Towards Goal

## 2021-01-15 NOTE — PROGRESS NOTES
Urology Progress Note        Assessment/Plan:     Patient Active Problem List   Diagnosis Code    Essential hypertension I10    Elevated blood sugar R73.9    Transaminitis R74.01    Vitamin D deficiency E55.9    Calculus of gallbladder without cholecystitis without obstruction K80.20    Hepatic cyst K76.89    Lumbar radicular pain M54.16    Thoracic spinal cord injury ([facility] [address] S24.109A    Paraplegia ([address] G82.20    Pressure injury of sacral region, stage 4 ([address] L89.154    Full incontinence of feces R15.9    Decubitus ulcer of coccygeal region, stage 3 ([address] L89.153    History of MRSA infection Z86.14    Urinary retention R33.9    Subacute osteomyelitis, other site ([address] M86.28    History of Clostridium difficile colitis Z86.19    Other pulmonary embolism without acute cor pulmonale (HCC) I26.99    Acute cystitis without hematuria N30.00    Acute hyponatremia E87.1    Anemia in other chronic diseases classified elsewhere D63.8    Colostomy present ([address] Z93.3    MARQUITA (acute kidney injury) ([address] N17.9    Fever R50.9    Hyperkalemia E87.5    Leukocytosis D72.829    MRSA (methicillin resistant staph aureus) culture positive Z22.322    Skin ulcer of sacrum, limited to breakdown of skin ([address] L98.421    UTI (urinary tract infection) N39.0    Wound infection T14. 8XXA, L08.9    Urinary tract bacterial infections N39.0, A49.9    Kidney stone N20.0   Assessment:    #1 Obstructing proximal right ureteral stones and additional right infectious nephrolithiasis    S/p cysto, right RPG, right ureteral stent placement on 1/12 by Dr. Husam Benjamin   Pre-op culture 1/11: 100k cfu/mL GNR   UCx 1/12/21 no growth   WBC 5.7 < 6.5 on admission   Cr 0.60    #2 Neurogenic bladder with chronic butler      Plan:  Patient to be discharged today, awaiting transport. Plan for return to OR on Tuesday AM for PCNL. Consider admitting on Monday night due to need for transport.   Rx for Cipro 500 mg BID x 14 days sent 1/14 by Dr. Phill Dowling PA-C       Subjective:     Daily Progress Note: 1/15/2021 8:26 AM    Yue Jauregui is doing well. She reports pain is absent. She has no new complaints. She is tolerating a solid diet. Tolerating stent and butler well. Butler draining light yellow urine. Objective:     Visit Vitals  /72   Pulse (!) 104   Temp 97.7 °F (36.5 °C)   Resp 16   Ht 5' 2\" (1.575 m)   Wt 165 lb (74.8 kg)   SpO2 93%   Breastfeeding No   BMI 30.18 kg/m²        Temp (24hrs), Av.1 °F (36.7 °C), Min:97.7 °F (36.5 °C), Max:98.6 °F (37 °C)      Intake and Output:   1901 - 01/15 0700  In: 2670 [P.O.:1170; I.V.:1500]  Out: 5800 [Urine:5800]  No intake/output data recorded. PHYSICAL EXAMINATION:   Visit Vitals  /72   Pulse (!) 104   Temp 97.7 °F (36.5 °C)   Resp 16   Ht 5' 2\" (1.575 m)   Wt 165 lb (74.8 kg)   SpO2 93%   Breastfeeding No   BMI 30.18 kg/m²     Constitutional: Well developed, well-nourished female in no acute distress. HEENT: Normocephalic, Atraumatic   CV:   no edema  Respiratory: No respiratory distress or difficulties breathing   Abdomen:  Soft and nontender.  Female: Butler draining light yellow urine   Skin:  Normal color. No evidence of jaundice. Neuro/Psych:  Patient with appropriate affect. Alert and oriented. Lab/Data Review: All lab results for the last 24 hours reviewed.     Labs:     Labs: Results:   Chemistry    Recent Labs     01/15/21  0250 21  0950 21  0508   * 175* 103*    142 141   K 3.9 4.1 4.1    108 108   CO2 29 31 27   BUN 10 7 15   CREA 0.53* 0.60 0.60   CA 9.4 9.1 9.1   AGAP 5 3 6   BUCR 19 12 25*      CBC w/Diff Recent Labs     01/15/21  0250 21  0950 21  0508   WBC 6.8 4.9 5.7   RBC 3.93* 3.73* 3.90*   HGB 11.6* 11.3* 11.5*   HCT 36.6 34.8* 36.7    136 144      Cultures Recent Labs     21  1048   CULT No growth (<1,000 CFU/ML)     All Micro Results     Procedure Component Value Units Date/Time    CULTURE, URINE [[id number]] Collected: 01/12/21 1048    Order Status: Completed Specimen: Urine from Kidney Updated: 01/14/21 1409     Special Requests: NO SPECIAL REQUESTS        Culture result: No growth (<1,000 CFU/ML)       CULTURE, URINE [[id number]]  (Abnormal)  (Susceptibility) Collected: 01/11/21 1640    Order Status: Completed Specimen: Cath Urine Updated: 01/14/21 1039     Special Requests: NO SPECIAL REQUESTS        [place] Count --        >100,000  COLONIES/mL       Culture result: KLEBSIELLA PNEUMONIAE       CULTURE, URINE [[id number]] Collected: 01/12/21 0758    Order Status: Completed Specimen: Urine Updated: 01/13/21 2055     Special Requests: NO SPECIAL REQUESTS        Culture result: No growth (<1,000 CFU/ML)       CULTURE, URINE [[id number]] Collected: 01/12/21 0801    Order Status: Completed Specimen: Urine Updated: 01/13/21 1918     Special Requests: NO SPECIAL REQUESTS        Culture result: No growth (<1,000 CFU/ML)       CULTURE, URINE [[id number]] Collected: 01/12/21 0800    Order Status: Canceled Specimen: Urine from Bladder             Urinalysis Color   Date Value Ref Range Status   07/16/2018 YELLOW/STRAW   Final     Comment:     Color Reference Range: Straw, Yellow or Dark Yellow     Appearance   Date Value Ref Range Status   07/16/2018 CLOUDY (A) CLEAR   Final     Specific gravity   Date Value Ref Range Status   07/16/2018 1.011 1.003 - 1.030   Final     pH (UA)   Date Value Ref Range Status   07/16/2018 7.5 5.0 - 8.0   Final     Protein   Date Value Ref Range Status   07/16/2018 NEGATIVE  NEG mg/dL Final     Ketone   Date Value Ref Range Status   07/16/2018 NEGATIVE  NEG mg/dL Final     Bilirubin   Date Value Ref Range Status   07/16/2018 NEGATIVE  NEG   Final     Blood   Date Value Ref Range Status   07/16/2018 NEGATIVE  NEG   Final     Urobilinogen   Date Value Ref Range Status   07/16/2018 0.2 0.2 - 1.0 EU/dL Final     Nitrites   Date Value Ref Range Status 07/16/2018 NEGATIVE  NEG   Final     Leukocyte Esterase   Date Value Ref Range Status   07/16/2018 SMALL (A) NEG   Final     Potassium   Date Value Ref Range Status   01/15/2021 3.9 3.5 - 5.5 mmol/L Final     Creatinine   Date Value Ref Range Status   01/15/2021 0.53 (L) 0.6 - 1.3 MG/DL Final     BUN   Date Value Ref Range Status   01/15/2021 10 7.0 - 18 MG/DL Final      PSA No results for input(s): PSA in the last 72 hours.    Coagulation No results found for: PTP, INR, APTT, INREXT, INREXT

## 2021-01-15 NOTE — ROUTINE PROCESS
Bedside shift change report given to Clowdy Sentons (oncoming nurse) by Damion Yusuf (offgoing nurse). Report included the following information SBAR, Kardex, Intake/Output, MAR and Recent Results.

## 2021-01-15 NOTE — ROUTINE PROCESS
Bedside shift change report given to Melvin Meeks RN (oncoming nurse) by Greg Hanson RN (offgoing nurse). Report included the following information SBAR, Kardex, Intake/Output, MAR, Recent Results and Med Rec Status.

## 2021-01-15 NOTE — PROGRESS NOTES
Problem: Falls - Risk of  Goal: *Absence of Falls  Description: Document Kwong Reason Fall Risk and appropriate interventions in the flowsheet. Outcome: Progressing Towards Goal  Note: Fall Risk Interventions:            Medication Interventions: Evaluate medications/consider consulting pharmacy, Patient to call before getting OOB, Teach patient to arise slowly    Elimination Interventions: Call light in reach, Patient to call for help with toileting needs, Toileting schedule/hourly rounds              Problem: Patient Education: Go to Patient Education Activity  Goal: Patient/Family Education  Outcome: Progressing Towards Goal     Problem: Pressure Injury - Risk of  Goal: *Prevention of pressure injury  Description: Document Orlando Scale and appropriate interventions in the flowsheet.   Outcome: Progressing Towards Goal  Note: Pressure Injury Interventions:  Sensory Interventions: Assess changes in LOC, Keep linens dry and wrinkle-free, Minimize linen layers    Moisture Interventions: Absorbent underpads, Internal/External urinary devices, Minimize layers    Activity Interventions: Assess need for specialty bed, Pressure redistribution bed/mattress(bed type), PT/OT evaluation    Mobility Interventions: Assess need for specialty bed, Pressure redistribution bed/mattress (bed type), PT/OT evaluation    Nutrition Interventions: Document food/fluid/supplement intake, Offer support with meals,snacks and hydration    Friction and Shear Interventions: Apply protective barrier, creams and emollients, Lift team/patient mobility team, Minimize layers                Problem: Patient Education: Go to Patient Education Activity  Goal: Patient/Family Education  Outcome: Progressing Towards Goal     Problem: Nutrition Deficit  Goal: *Optimize nutritional status  Outcome: Progressing Towards Goal     Problem: Kidney Stone Care Plan (Adult)  Goal: *Elimination of kidney stone(s)  Outcome: Progressing Towards Goal  Goal: *Control of acute pain  Outcome: Progressing Towards Goal     Problem: Patient Education: Go to Patient Education Activity  Goal: Patient/Family Education  Outcome: Progressing Towards Goal     Problem: Urinary Tract Infection - Adult  Goal: *Absence of infection signs and symptoms  Outcome: Progressing Towards Goal     Problem: Patient Education: Go to Patient Education Activity  Goal: Patient/Family Education  Outcome: Progressing Towards Goal     Problem: Ostomy Care  Goal: *Patient pouching appliance will fit properly and maintain integrity at least three to five days  Description: Infection control procedures (eg, clean dressings, clean gloves, hand washing, precautions to isolate wound from contamination, sterile instruments used for wound debridement) should be implemented. Outcome: Progressing Towards Goal  Goal: *Acceptance of change in body image  Outcome: Progressing Towards Goal     Problem: Patient Education: Go to Patient Education Activity  Goal: Patient/Family Education  Outcome: Progressing Towards Goal     Problem: Patient Education: Go to Patient Education Activity  Goal: Patient/Family Education  Outcome: Progressing Towards Goal     Problem: Impaired Skin Integrity/Pressure Injury Treatment  Goal: *Improvement of Existing Pressure Injury  Outcome: Progressing Towards Goal  Goal: *Prevention of pressure injury  Description: Document Orlando Scale and appropriate interventions in the flowsheet.   Outcome: Progressing Towards Goal     Problem: Patient Education: Go to Patient Education Activity  Goal: Patient/Family Education  Outcome: Progressing Towards Goal

## 2021-01-15 NOTE — PROGRESS NOTES
Discharge order noted for today. Orders reviewed. met with patient at bedside. Her personal aide is at the house now to meet patient when she arrives. Transportation set up through [employer] w/; [employer] to  patient at 1pm. No further needs identified at this time.  remains available if needed.   Tatiana Rosas RN - Outcomes Manager  [phone]

## 2021-01-15 NOTE — ROUTINE PROCESS
Received report from [facility]. Pt AAOx3, NAD, breathing non labored, on room air, HOB up. IV site clean, dry and intact. King cath in place. Ostomy noted. Bed at the lowest level on lock position, call bell w/i reach. Bedside and Verbal shift change report given to ARACELI Lemons (oncoming nurse) by me (offgoing nurse). Report included the following information SBAR, Kardex, Procedure Summary, Intake/Output, MAR and Recent Results.

## 2021-01-18 ENCOUNTER — ANESTHESIA EVENT (OUTPATIENT)
Dept: SURGERY | Age: 71
End: 2021-01-18
Payer: MEDICARE

## 2021-01-19 ENCOUNTER — ANESTHESIA (OUTPATIENT)
Dept: SURGERY | Age: 71
End: 2021-01-19
Payer: MEDICARE

## 2021-01-19 ENCOUNTER — APPOINTMENT (OUTPATIENT)
Dept: GENERAL RADIOLOGY | Age: 71
End: 2021-01-19
Attending: UROLOGY
Payer: MEDICARE

## 2021-01-19 ENCOUNTER — HOSPITAL ENCOUNTER (OUTPATIENT)
Age: 71
Setting detail: OBSERVATION
LOS: 1 days | Discharge: HOME | End: 2021-01-20
Attending: UROLOGY | Admitting: UROLOGY
Payer: MEDICARE

## 2021-01-19 DIAGNOSIS — N20.0 STONE IN KIDNEY: Primary | ICD-10-CM

## 2021-01-19 PROCEDURE — 99218 HC RM OBSERVATION: CPT

## 2021-01-19 PROCEDURE — 77030038846 HC SCPE URETSCP LITHOVUE DISP BSC -H: Performed by: UROLOGY

## 2021-01-19 PROCEDURE — 76010000162 HC OR TIME 1.5 TO 2 HR INTENSV-TIER 1: Performed by: UROLOGY

## 2021-01-19 PROCEDURE — 74011250636 HC RX REV CODE- 250/636: Performed by: NURSE ANESTHETIST, CERTIFIED REGISTERED

## 2021-01-19 PROCEDURE — 77030030430 HC EXTRCT STN COOK -C: Performed by: UROLOGY

## 2021-01-19 PROCEDURE — 77030006974 HC BSKT URET RTVR BSC -C: Performed by: UROLOGY

## 2021-01-19 PROCEDURE — C1769 GUIDE WIRE: HCPCS | Performed by: UROLOGY

## 2021-01-19 PROCEDURE — 77030020268 HC MISC GENERAL SUPPLY: Performed by: UROLOGY

## 2021-01-19 PROCEDURE — 77030008683 HC TU ET CUF COVD -A: Performed by: ANESTHESIOLOGY

## 2021-01-19 PROCEDURE — 87086 URINE CULTURE/COLONY COUNT: CPT

## 2021-01-19 PROCEDURE — 77030010507 HC ADH SKN DERMBND J&J -B: Performed by: UROLOGY

## 2021-01-19 PROCEDURE — 87635 SARS-COV-2 COVID-19 AMP PRB: CPT

## 2021-01-19 PROCEDURE — 74011000250 HC RX REV CODE- 250: Performed by: UROLOGY

## 2021-01-19 PROCEDURE — 87186 SC STD MICRODIL/AGAR DIL: CPT

## 2021-01-19 PROCEDURE — 00910 ANES TRANSURETHRAL PX NOS: CPT | Performed by: ANESTHESIOLOGY

## 2021-01-19 PROCEDURE — 74011250636 HC RX REV CODE- 250/636: Performed by: ANESTHESIOLOGY

## 2021-01-19 PROCEDURE — 77030002933 HC SUT MCRYL J&J -A: Performed by: UROLOGY

## 2021-01-19 PROCEDURE — 77030031139 HC SUT VCRL2 J&J -A: Performed by: UROLOGY

## 2021-01-19 PROCEDURE — 74011000250 HC RX REV CODE- 250: Performed by: ANESTHESIOLOGY

## 2021-01-19 PROCEDURE — 74011000636 HC RX REV CODE- 636: Performed by: UROLOGY

## 2021-01-19 PROCEDURE — 77030012961 HC IRR KT CYSTO/TUR ICUM -A: Performed by: UROLOGY

## 2021-01-19 PROCEDURE — 77030040831 HC BAG URINE DRNG MDII -A: Performed by: UROLOGY

## 2021-01-19 PROCEDURE — 77030005518 HC CATH URETH FOL 2W BARD -B: Performed by: UROLOGY

## 2021-01-19 PROCEDURE — C1894 INTRO/SHEATH, NON-LASER: HCPCS | Performed by: UROLOGY

## 2021-01-19 PROCEDURE — 82360 CALCULUS ASSAY QUANT: CPT

## 2021-01-19 PROCEDURE — 76210000016 HC OR PH I REC 1 TO 1.5 HR: Performed by: UROLOGY

## 2021-01-19 PROCEDURE — 76060000034 HC ANESTHESIA 1.5 TO 2 HR: Performed by: UROLOGY

## 2021-01-19 PROCEDURE — C1758 CATHETER, URETERAL: HCPCS | Performed by: UROLOGY

## 2021-01-19 PROCEDURE — 2709999900 HC NON-CHARGEABLE SUPPLY

## 2021-01-19 PROCEDURE — 87077 CULTURE AEROBIC IDENTIFY: CPT

## 2021-01-19 PROCEDURE — 74011250637 HC RX REV CODE- 250/637: Performed by: UROLOGY

## 2021-01-19 PROCEDURE — 74425 UROGRAPHY ANTEGRADE RS&I: CPT

## 2021-01-19 PROCEDURE — 77030026438 HC STYL ET INTUB CARD -A: Performed by: ANESTHESIOLOGY

## 2021-01-19 PROCEDURE — 2709999900 HC NON-CHARGEABLE SUPPLY: Performed by: UROLOGY

## 2021-01-19 PROCEDURE — 74011250636 HC RX REV CODE- 250/636: Performed by: UROLOGY

## 2021-01-19 PROCEDURE — 74011250637 HC RX REV CODE- 250/637: Performed by: NURSE ANESTHETIST, CERTIFIED REGISTERED

## 2021-01-19 PROCEDURE — 74011000258 HC RX REV CODE- 258: Performed by: ANESTHESIOLOGY

## 2021-01-19 RX ORDER — ONDANSETRON 2 MG/ML
4 INJECTION INTRAMUSCULAR; INTRAVENOUS
Status: DISCONTINUED | OUTPATIENT
Start: 2021-01-19 | End: 2021-01-20 | Stop reason: HOSPADM

## 2021-01-19 RX ORDER — FENTANYL CITRATE 50 UG/ML
INJECTION, SOLUTION INTRAMUSCULAR; INTRAVENOUS AS NEEDED
Status: DISCONTINUED | OUTPATIENT
Start: 2021-01-19 | End: 2021-01-19 | Stop reason: HOSPADM

## 2021-01-19 RX ORDER — PREGABALIN 75 MG/1
75 CAPSULE ORAL 3 TIMES DAILY
Status: DISCONTINUED | OUTPATIENT
Start: 2021-01-19 | End: 2021-01-20 | Stop reason: HOSPADM

## 2021-01-19 RX ORDER — OXYCODONE HYDROCHLORIDE 5 MG/1
5 TABLET ORAL
Status: DISCONTINUED | OUTPATIENT
Start: 2021-01-19 | End: 2021-01-20 | Stop reason: HOSPADM

## 2021-01-19 RX ORDER — GLYCOPYRROLATE 0.2 MG/ML
INJECTION INTRAMUSCULAR; INTRAVENOUS AS NEEDED
Status: DISCONTINUED | OUTPATIENT
Start: 2021-01-19 | End: 2021-01-19 | Stop reason: HOSPADM

## 2021-01-19 RX ORDER — CIPROFLOXACIN 500 MG/1
500 TABLET ORAL 2 TIMES DAILY
Status: DISCONTINUED | OUTPATIENT
Start: 2021-01-19 | End: 2021-01-20 | Stop reason: HOSPADM

## 2021-01-19 RX ORDER — SODIUM CHLORIDE 0.9 % (FLUSH) 0.9 %
5-40 SYRINGE (ML) INJECTION EVERY 8 HOURS
Status: DISCONTINUED | OUTPATIENT
Start: 2021-01-19 | End: 2021-01-20 | Stop reason: HOSPADM

## 2021-01-19 RX ORDER — OXYCODONE AND ACETAMINOPHEN 5; 325 MG/1; MG/1
1 TABLET ORAL
Qty: 10 TAB | Refills: 0 | Status: SHIPPED | OUTPATIENT
Start: 2021-01-19 | End: 2021-01-22

## 2021-01-19 RX ORDER — SODIUM CHLORIDE 0.9 % (FLUSH) 0.9 %
5-40 SYRINGE (ML) INJECTION AS NEEDED
Status: DISCONTINUED | OUTPATIENT
Start: 2021-01-19 | End: 2021-01-19 | Stop reason: HOSPADM

## 2021-01-19 RX ORDER — LIDOCAINE HYDROCHLORIDE 20 MG/ML
INJECTION, SOLUTION EPIDURAL; INFILTRATION; INTRACAUDAL; PERINEURAL AS NEEDED
Status: DISCONTINUED | OUTPATIENT
Start: 2021-01-19 | End: 2021-01-19 | Stop reason: HOSPADM

## 2021-01-19 RX ORDER — DOCUSATE SODIUM 100 MG/1
100 CAPSULE, LIQUID FILLED ORAL 2 TIMES DAILY
Status: DISCONTINUED | OUTPATIENT
Start: 2021-01-19 | End: 2021-01-20 | Stop reason: HOSPADM

## 2021-01-19 RX ORDER — SODIUM CHLORIDE 0.9 % (FLUSH) 0.9 %
5-40 SYRINGE (ML) INJECTION EVERY 8 HOURS
Status: DISCONTINUED | OUTPATIENT
Start: 2021-01-19 | End: 2021-01-19 | Stop reason: HOSPADM

## 2021-01-19 RX ORDER — PROPOFOL 10 MG/ML
INJECTION, EMULSION INTRAVENOUS AS NEEDED
Status: DISCONTINUED | OUTPATIENT
Start: 2021-01-19 | End: 2021-01-19

## 2021-01-19 RX ORDER — BUPIVACAINE HYDROCHLORIDE 5 MG/ML
INJECTION, SOLUTION EPIDURAL; INTRACAUDAL AS NEEDED
Status: DISCONTINUED | OUTPATIENT
Start: 2021-01-19 | End: 2021-01-19 | Stop reason: HOSPADM

## 2021-01-19 RX ORDER — ACETAMINOPHEN 325 MG/1
500 TABLET ORAL
COMMUNITY

## 2021-01-19 RX ORDER — SODIUM CHLORIDE, SODIUM LACTATE, POTASSIUM CHLORIDE, CALCIUM CHLORIDE 600; 310; 30; 20 MG/100ML; MG/100ML; MG/100ML; MG/100ML
50 INJECTION, SOLUTION INTRAVENOUS CONTINUOUS
Status: DISCONTINUED | OUTPATIENT
Start: 2021-01-19 | End: 2021-01-19 | Stop reason: HOSPADM

## 2021-01-19 RX ORDER — SODIUM CHLORIDE 9 MG/ML
125 INJECTION, SOLUTION INTRAVENOUS CONTINUOUS
Status: DISCONTINUED | OUTPATIENT
Start: 2021-01-19 | End: 2021-01-20 | Stop reason: HOSPADM

## 2021-01-19 RX ORDER — FAMOTIDINE 20 MG/1
20 TABLET, FILM COATED ORAL ONCE
Status: COMPLETED | OUTPATIENT
Start: 2021-01-19 | End: 2021-01-19

## 2021-01-19 RX ORDER — AMITRIPTYLINE HYDROCHLORIDE 10 MG/1
10 TABLET, FILM COATED ORAL
Status: DISCONTINUED | OUTPATIENT
Start: 2021-01-19 | End: 2021-01-20 | Stop reason: HOSPADM

## 2021-01-19 RX ORDER — ONDANSETRON 2 MG/ML
INJECTION INTRAMUSCULAR; INTRAVENOUS AS NEEDED
Status: DISCONTINUED | OUTPATIENT
Start: 2021-01-19 | End: 2021-01-19 | Stop reason: HOSPADM

## 2021-01-19 RX ORDER — LIDOCAINE HYDROCHLORIDE 10 MG/ML
0.1 INJECTION, SOLUTION EPIDURAL; INFILTRATION; INTRACAUDAL; PERINEURAL AS NEEDED
Status: DISCONTINUED | OUTPATIENT
Start: 2021-01-19 | End: 2021-01-19 | Stop reason: HOSPADM

## 2021-01-19 RX ORDER — AMPICILLIN 2 G/1
2 INJECTION, POWDER, FOR SOLUTION INTRAVENOUS
Status: DISCONTINUED | OUTPATIENT
Start: 2021-01-19 | End: 2021-01-19

## 2021-01-19 RX ORDER — MIDAZOLAM HYDROCHLORIDE 1 MG/ML
INJECTION, SOLUTION INTRAMUSCULAR; INTRAVENOUS AS NEEDED
Status: DISCONTINUED | OUTPATIENT
Start: 2021-01-19 | End: 2021-01-19 | Stop reason: HOSPADM

## 2021-01-19 RX ORDER — ACETAMINOPHEN 500 MG
1000 TABLET ORAL EVERY 6 HOURS
Status: DISCONTINUED | OUTPATIENT
Start: 2021-01-19 | End: 2021-01-20 | Stop reason: HOSPADM

## 2021-01-19 RX ORDER — FLUCONAZOLE 2 MG/ML
400 INJECTION, SOLUTION INTRAVENOUS
Status: DISPENSED | OUTPATIENT
Start: 2021-01-19 | End: 2021-01-20

## 2021-01-19 RX ORDER — ONDANSETRON 2 MG/ML
4 INJECTION INTRAMUSCULAR; INTRAVENOUS ONCE
Status: COMPLETED | OUTPATIENT
Start: 2021-01-19 | End: 2021-01-19

## 2021-01-19 RX ORDER — HYDROMORPHONE HYDROCHLORIDE 2 MG/ML
0.5 INJECTION, SOLUTION INTRAMUSCULAR; INTRAVENOUS; SUBCUTANEOUS
Status: DISCONTINUED | OUTPATIENT
Start: 2021-01-19 | End: 2021-01-19 | Stop reason: HOSPADM

## 2021-01-19 RX ORDER — SODIUM CHLORIDE 0.9 % (FLUSH) 0.9 %
5-40 SYRINGE (ML) INJECTION AS NEEDED
Status: DISCONTINUED | OUTPATIENT
Start: 2021-01-19 | End: 2021-01-20 | Stop reason: HOSPADM

## 2021-01-19 RX ORDER — HYDROMORPHONE HYDROCHLORIDE 1 MG/ML
1 INJECTION, SOLUTION INTRAMUSCULAR; INTRAVENOUS; SUBCUTANEOUS
Status: DISCONTINUED | OUTPATIENT
Start: 2021-01-19 | End: 2021-01-20 | Stop reason: HOSPADM

## 2021-01-19 RX ORDER — NALOXONE HYDROCHLORIDE 0.4 MG/ML
0.4 INJECTION, SOLUTION INTRAMUSCULAR; INTRAVENOUS; SUBCUTANEOUS AS NEEDED
Status: DISCONTINUED | OUTPATIENT
Start: 2021-01-19 | End: 2021-01-20 | Stop reason: HOSPADM

## 2021-01-19 RX ORDER — APIXABAN 5 MG/1
5 TABLET, FILM COATED ORAL DAILY
Qty: 30 TAB | Refills: 0 | Status: SHIPPED
Start: 2021-01-26

## 2021-01-19 RX ORDER — DEXAMETHASONE SODIUM PHOSPHATE 4 MG/ML
INJECTION, SOLUTION INTRA-ARTICULAR; INTRALESIONAL; INTRAMUSCULAR; INTRAVENOUS; SOFT TISSUE AS NEEDED
Status: DISCONTINUED | OUTPATIENT
Start: 2021-01-19 | End: 2021-01-19 | Stop reason: HOSPADM

## 2021-01-19 RX ORDER — DIPHENHYDRAMINE HYDROCHLORIDE 50 MG/ML
12.5 INJECTION, SOLUTION INTRAMUSCULAR; INTRAVENOUS
Status: DISCONTINUED | OUTPATIENT
Start: 2021-01-19 | End: 2021-01-20 | Stop reason: HOSPADM

## 2021-01-19 RX ORDER — GENTAMICIN SULFATE 80 MG/100ML
INJECTION, SOLUTION INTRAVENOUS AS NEEDED
Status: DISCONTINUED | OUTPATIENT
Start: 2021-01-19 | End: 2021-01-19 | Stop reason: HOSPADM

## 2021-01-19 RX ORDER — LORATADINE 10 MG/1
10 TABLET ORAL
Status: DISCONTINUED | OUTPATIENT
Start: 2021-01-19 | End: 2021-01-20 | Stop reason: HOSPADM

## 2021-01-19 RX ORDER — METOPROLOL TARTRATE 25 MG/1
12.5 TABLET, FILM COATED ORAL 2 TIMES DAILY
Status: DISCONTINUED | OUTPATIENT
Start: 2021-01-19 | End: 2021-01-20 | Stop reason: HOSPADM

## 2021-01-19 RX ORDER — IPRATROPIUM BROMIDE AND ALBUTEROL SULFATE 2.5; .5 MG/3ML; MG/3ML
3 SOLUTION RESPIRATORY (INHALATION)
Status: DISCONTINUED | OUTPATIENT
Start: 2021-01-19 | End: 2021-01-20 | Stop reason: HOSPADM

## 2021-01-19 RX ORDER — SENNOSIDES 8.6 MG/1
1 TABLET ORAL DAILY PRN
Status: DISCONTINUED | OUTPATIENT
Start: 2021-01-19 | End: 2021-01-20 | Stop reason: HOSPADM

## 2021-01-19 RX ORDER — SUCCINYLCHOLINE CHLORIDE 20 MG/ML
INJECTION INTRAMUSCULAR; INTRAVENOUS AS NEEDED
Status: DISCONTINUED | OUTPATIENT
Start: 2021-01-19 | End: 2021-01-19 | Stop reason: HOSPADM

## 2021-01-19 RX ADMIN — ONDANSETRON 4 MG: 2 INJECTION INTRAMUSCULAR; INTRAVENOUS at 15:05

## 2021-01-19 RX ADMIN — CIPROFLOXACIN HYDROCHLORIDE 500 MG: 500 TABLET, FILM COATED ORAL at 21:10

## 2021-01-19 RX ADMIN — ONDANSETRON 4 MG: 2 INJECTION INTRAMUSCULAR; INTRAVENOUS at 12:24

## 2021-01-19 RX ADMIN — Medication 10 ML: at 21:16

## 2021-01-19 RX ADMIN — DOCUSATE SODIUM 100 MG: 100 CAPSULE, LIQUID FILLED ORAL at 21:10

## 2021-01-19 RX ADMIN — PHENYLEPHRINE HYDROCHLORIDE 100 MCG: 10 INJECTION INTRAVENOUS at 12:53

## 2021-01-19 RX ADMIN — LIDOCAINE HYDROCHLORIDE 20 MG: 20 INJECTION, SOLUTION EPIDURAL; INFILTRATION; INTRACAUDAL; PERINEURAL at 12:53

## 2021-01-19 RX ADMIN — PREGABALIN 75 MG: 75 CAPSULE ORAL at 21:10

## 2021-01-19 RX ADMIN — AMPICILLIN 2 G: 2 INJECTION, POWDER, FOR SOLUTION INTRAVENOUS at 12:17

## 2021-01-19 RX ADMIN — ACETAMINOPHEN 1000 MG: 500 TABLET ORAL at 15:00

## 2021-01-19 RX ADMIN — FENTANYL CITRATE 50 MCG: 50 INJECTION, SOLUTION INTRAMUSCULAR; INTRAVENOUS at 12:24

## 2021-01-19 RX ADMIN — FAMOTIDINE 20 MG: 20 TABLET, FILM COATED ORAL at 09:26

## 2021-01-19 RX ADMIN — SUCCINYLCHOLINE CHLORIDE 100 MG: 20 INJECTION, SOLUTION INTRAMUSCULAR; INTRAVENOUS at 12:24

## 2021-01-19 RX ADMIN — GENTAMICIN SULFATE 80 MG: 80 INJECTION, SOLUTION INTRAVENOUS at 12:17

## 2021-01-19 RX ADMIN — SODIUM CHLORIDE 125 ML/HR: 900 INJECTION, SOLUTION INTRAVENOUS at 20:18

## 2021-01-19 RX ADMIN — ONDANSETRON 4 MG: 2 INJECTION INTRAMUSCULAR; INTRAVENOUS at 12:53

## 2021-01-19 RX ADMIN — GLYCOPYRROLATE 0.2 MG: 0.2 INJECTION INTRAMUSCULAR; INTRAVENOUS at 12:24

## 2021-01-19 RX ADMIN — DEXAMETHASONE SODIUM PHOSPHATE 8 MG: 4 INJECTION, SOLUTION INTRAMUSCULAR; INTRAVENOUS at 12:53

## 2021-01-19 RX ADMIN — ACETAMINOPHEN 1000 MG: 500 TABLET ORAL at 21:11

## 2021-01-19 RX ADMIN — AMITRIPTYLINE HYDROCHLORIDE 10 MG: 10 TABLET, FILM COATED ORAL at 21:11

## 2021-01-19 RX ADMIN — SODIUM CHLORIDE, SODIUM LACTATE, POTASSIUM CHLORIDE, AND CALCIUM CHLORIDE 50 ML/HR: 600; 310; 30; 20 INJECTION, SOLUTION INTRAVENOUS at 09:26

## 2021-01-19 RX ADMIN — MIDAZOLAM HYDROCHLORIDE 2 MG: 2 INJECTION, SOLUTION INTRAMUSCULAR; INTRAVENOUS at 12:24

## 2021-01-19 RX ADMIN — FENTANYL CITRATE 50 MCG: 50 INJECTION, SOLUTION INTRAMUSCULAR; INTRAVENOUS at 12:53

## 2021-01-19 RX ADMIN — PHENYLEPHRINE HYDROCHLORIDE 100 MCG: 10 INJECTION INTRAVENOUS at 13:10

## 2021-01-19 RX ADMIN — METOPROLOL TARTRATE 12.5 MG: 25 TABLET, FILM COATED ORAL at 21:11

## 2021-01-19 NOTE — OP NOTES
Operative Note  Patient: Phillip Mullen               Sex: female             MRN: [id number]  YOB: 1950      Age:  72 y.o.               Preoperative Diagnosis: RIGHT KIDNEY STONE  Postoperative Diagnosis:  Same   Surgeon: Rasta Mcneill:  Dr. Cherie Amezquita     Indication: This is a 73 y/o woman with neurogenic bladder, chronic butler, large right stone burden s/p stenting last week for UTI and obstructing proximal stones on abx, with documented confirmed negative culture here today for PCNL.       Procedure:    1) Cystoscopy ([facility])  2) Flexible ureteroscopy  ([facility])   3) Surgeon acquired percutaneous renal access ([facility])  4) Percutaneous nephrolithotomy ([facility])   5) Nephrostomy tube placement  [facility])      Findings:    1). Normal cystoscopy  2)  Ureteroscopy revealed multiple proximal ureteral stones, bifid system and multiple intrarenal stones  3). Right Percutaneous renal acces obtained via bullseye technique, upper pole    4). Total stone burden 4cm, visually completely cleared   5). 18fr Rogers catheter as nephrostomy tube      Narrative of events: The patient was brought to the operative suite. Anesthesia was induced and preoperative antibiotics were administered. The were prepped in split leg prone position with access to the urethra and to the flank. After appropriate pause and confirmation of antibiotic administration, cystoscopy was performed. Bladder was normal.  Right ureteral orifice was identified and cannulated with a 0.035 sensor wire. Stent was grasped and removed. A duel lumen was advanced and retrogarde pyelogram performed revealing multiple stones in the proximal ureter and moderate hydronephrosis. Second wire was advanced and an access sheath was advanced to the proximal ureter. Flexible ureteroscopy was then performed. This revealed multiple stones in the proximal ureter.  These were pushed back into the kidney with the scope.       An upper pole calyx was identified that would be adequate for access. Bullseye technique was utilized to enter the calyx. This was observed both fluoroscopically and endoscopically. A paniagua wire was advanced and grasped with a basket with the flexible ureteroscopy and brought out of the urethra thus obtaining through and through access. A tigertail catheter was advanced over the wire all the way into the bladder and out the urethra to then exchange the wire for a super stiff. The balloon was advanced over the super stiff and confirmed just at the point of entry into the calyx endoscopically. The balloon was inflated and a 24 fr sheath was advanced over the balloon which was removed and rigid nephroscope was advanced. The ultrasonic lithotriptor was used to break up all the stone and remove it. Spinzo Drier was sent for analysis. Flexible nephroscopy was then performed to ensure no residual fragments. Retrograde flexible ureteroscopy was then performed to clear the ureter. Given the excellent patency of the ureter, we elected not to place a stent and use a nephrostomy tube. An 18fr butler was placed over the wire in the flank and 3cc placed into balloon. This was nicely positioned in the renal pelvis. Nephrostogram revealed prompt drainage down the ureter and no extravasation. The sheath was removed and there was minimal bleeding from the puncture site. Patient was awoken from anesthesia and tolerated the procedure well. There were then transferred to the recovery room in stable condition.      Estimated Blood Loss: 50 CC      Anesthesia:  General                  Implants: * No implants in log *     Specimens: * No specimens in log *      Complications:  None     Plan:  1. CT in AM  2. Methylene blue into nephrostomy tube tomorrow and if butler turns blue, then remove the neph tube   3.  If no issues, home tomorrow with butler         Urban Wynn MD  1/19/2021

## 2021-01-19 NOTE — DISCHARGE INSTRUCTIONS
Discharge Instructions      Patient: Jd Rashid               Sex: female          DOA: 1/19/2021         YOB: 1950      Age:  72 y.o.        LOS:  LOS: 1 day     ACUTE DIAGNOSES:  Nephrolithiasis     CHRONIC MEDICAL DIAGNOSES:  Problem List as of 1/19/2021 Date Reviewed: 11/11/2020          Codes Class Noted - Resolved    Stone in kidney ICD-10-CM: N20.0  ICD-9-CM: 592.0  1/19/2021 - Present        Kidney stone ICD-10-CM: N20.0  ICD-9-CM: 592.0  1/12/2021 - Present        Urinary tract bacterial infections ICD-10-CM: N39.0, A49.9  ICD-9-CM: 599.0, 041.9  1/11/2021 - Present        Wound infection ICD-10-CM: T14. 8XXA, L08.9  ICD-9-CM: 958.3  5/21/2020 - Present        MRSA (methicillin resistant staph aureus) culture positive ICD-10-CM: Z22.322  ICD-9-CM: V02.54  5/20/2020 - Present        MARQUITA (acute kidney injury) ([facility] ICD-10-CM: N17.9  ICD-9-CM: 584.9  4/6/2020 - Present        Hyperkalemia ICD-10-CM: E87.5  ICD-9-CM: 276.7  4/6/2020 - Present        Leukocytosis ICD-10-CM: H89.709  ICD-9-CM: 288.60  4/6/2020 - Present        UTI (urinary tract infection) ICD-10-CM: N39.0  ICD-9-CM: 599.0  4/6/2020 - Present        Fever ICD-10-CM: R50.9  ICD-9-CM: 780.60  5/2/2019 - Present        Skin ulcer of sacrum, limited to breakdown of skin ([facility] ICD-10-CM: L98.421  ICD-9-CM: 707.8  5/2/2019 - Present        Colostomy present ([facility] ICD-10-CM: Z93.3  ICD-9-CM: V44.3  2/27/2019 - Present        Other pulmonary embolism without acute cor pulmonale (HCC) ICD-10-CM: I26.99  ICD-9-CM: 415.19  2/13/2019 - Present    Overview Signed 2/13/2019  1:00 PM by Ezra Mathur MD     2/2019.  On eliquis             Subacute osteomyelitis, other site [facility]) ICD-10-CM: M86.28  ICD-9-CM: 730.08  12/19/2018 - Present        History of Clostridium difficile colitis ICD-10-CM: Z86.19  ICD-9-CM: V12.79  12/19/2018 - Present        Acute cystitis without hematuria ICD-10-CM: N30.00  ICD-9-CM: 595.0  12/6/2018 - Present Acute hyponatremia ICD-10-CM: E87.1  ICD-9-CM: 276.1  11/19/2018 - Present        Anemia in other chronic diseases classified elsewhere ICD-10-CM: D63.8  ICD-9-CM: 285.29  11/19/2018 - Present        History of MRSA infection ICD-10-CM: Z86.14  ICD-9-CM: V12.04  10/19/2018 - Present    Overview Signed 10/19/2018 12:55 PM by Mariajose Field MD     uti/cath             Urinary retention ICD-10-CM: R33.9  ICD-9-CM: 788.20  10/19/2018 - Present        Decubitus ulcer of coccygeal region, stage 3 ([facility] ICD-10-CM: T91.096  ICD-9-CM: 707.03, 707.23  10/18/2018 - Present    Overview Signed 11/19/2018 12:28 PM by Mariajose Field MD     Possible coccyx osteomyelitis             Pressure injury of sacral region, stage 4 ([facility] ICD-10-CM: K90.089  ICD-9-CM: 707.03, 707.24  9/13/2018 - Present        Full incontinence of feces ICD-10-CM: R15.9  ICD-9-CM: 787.60  9/13/2018 - Present        Paraplegia ([facility] ICD-10-CM: G82.20  ICD-9-CM: 344.1  7/31/2018 - Present        Thoracic spinal cord injury [facility]) ICD-10-CM: G89.225F  ICD-9-CM: 952.10  5/29/2018 - Present    Overview Signed 5/29/2018  2:28 PM by Mariajose Field MD     Spinal stenosis with myelopathic sx, s/p T4-9 decompressive laminectomy 5/11, decline in SSEP and MEP during procedure. LE paraplegia. Lumbar radicular pain ICD-10-CM: M54.16  ICD-9-CM: 724.4  3/6/2018 - Present    Overview Signed 5/1/2018  1:21 PM by Mariajose Field MD     L2-3 radiculopathy based on NCS Dr. Evans Floyd             Calculus of gallbladder without cholecystitis without obstruction ICD-10-CM: K80.20  ICD-9-CM: 574.20  2/27/2018 - Present        Hepatic cyst ICD-10-CM: K76.89  ICD-9-CM: 573.8  2/27/2018 - Present    Overview Signed 4/18/2018 12:41 PM by Mariajose Field MD     18.6cm x 14cm.   Referred to Dr. Amor Faulkner             Vitamin D deficiency ICD-10-CM: E55.9  ICD-9-CM: 268.9  12/4/2017 - Present        Essential hypertension ICD-10-CM: I10  ICD-9-CM: 401.9  12/1/2017 - Present Overview Signed 12/1/2017 10:08 AM by Leonel Osborne MD     Intol of hctz             Elevated blood sugar ICD-10-CM: R73.9  ICD-9-CM: 790.29  12/1/2017 - Present        Transaminitis ICD-10-CM: R74.01  ICD-9-CM: 790.4  12/1/2017 - Present        RESOLVED: Constipation due to opioid therapy ICD-10-CM: K59.03, T40.2X5A  ICD-9-CM: 564.09, E935.2  9/13/2018 - 10/19/2018              ACTIVITY: No heavy lifting for 1 week or until urine is clear, whichever is longer. ADDITIONAL INFORMATION:     If you experience any fevers > 100.4, significant nausea/vomiting, or significant worsening of pain, please contact us at the number below. It is common to experience mild, recurrent blood in your urine and this is likely due to stent irritation. The general trend should be decreasing bleeding with occasional flares due to increased activity. If the bleeding continues to worsen with passage of clots, you are unable to urinate, or you experience significant light-headedness, please contact us at the number below. If you develop new bleeding after a period of clear urine and this is severe, particularly if this is around 10 days after surgery, please contact our office immediately. If the bleeding is severe with clots or you feel lightheaded, please proceed immediately to the closest Emergency department. You may resume showering but do not directly scrub the incision. You may resume bathing in two week. Please inspect your incision daily, looking for signs of infection (increasing/spreading redness, swelling, drainage). Keep the incision clean and dry. Should urine leakage persist beyond one week, please contact [facility]. FOLLOW UP CARE:  You have a return in appointment in about 6 weeks with Ultrasound and [facility].

## 2021-01-19 NOTE — PERIOP NOTES
Patient repositioned. Washed hands and place dentures in her mouth then set up for regular diet.  Patient enjoying her meal.

## 2021-01-19 NOTE — PERIOP NOTES
TRANSFER - OUT REPORT:    Verbal report given to ARACELI Johnson on Cash Cantu  being transferred to (unit) for routine post - op       Report consisted of patients Situation, Background, Assessment and   Recommendations(SBAR). Information from the following report(s) SBAR, Kardex, Procedure Summary, Intake/Output, MAR and Recent Results was reviewed with the receiving nurse. Lines:   Peripheral IV 01/19/21 Right; Inner Forearm (Active)   Site Assessment Clean, dry, & intact 01/19/21 1547   Phlebitis Assessment 0 01/19/21 1547   Infiltration Assessment 0 01/19/21 1547   Dressing Status Clean, dry, & intact 01/19/21 1547   Dressing Type Tape;Transparent 01/19/21 1547   Hub Color/Line Status Pink; Infusing 01/19/21 1547   Action Taken Dressing reinforced 01/19/21 0945   Alcohol Cap Used No 01/19/21 0945      Visit Vitals  BP (!) 150/93   Pulse 76   Temp 98.4 °F (36.9 °C)   Resp 22   Ht 5' 2\" (1.575 m)   Wt 77.1 kg (170 lb)   SpO2 100%   BMI 31.09 kg/m²       Opportunity for questions and clarification was provided.       Patient transported with:   O2 @ 2 liters  Registered Nurse  [facility]

## 2021-01-19 NOTE — INTERVAL H&P NOTE
Update History & Physical 
 
The Patient's History and Physical of January 13, 2021, Progress note was reviewed with the patient and I examined the patient. There was no change. The surgical site was confirmed by the patient and me. Plan:  The risk, benefits, expected outcome, and alternative to the recommended procedure have been discussed with the patient. Patient understands and wants to proceed with the procedure.  
 
Electronically signed by Prabhakar Valentine MD on 1/19/2021 at 11:21 AM

## 2021-01-19 NOTE — OP NOTES
Operative Note  Patient: Cydney Sinclair               Sex: female             MRN: [id number]  YOB: 1950      Age:  72 y.o. Preoperative Diagnosis: RIGHT KIDNEY STONE  Postoperative Diagnosis:  * No post-op diagnosis entered *  Surgeon: Yevonne Goodpasture   Cosurgeon:  Dr. Brynn Estes    Indication: This is a 71 y/o woman with neurogenic bladder, chronic butler, large right stone burden s/p stenting last week for UTI and obstructing proximal stones on abx, with documented confirmed negative culture here today for PCNL. Procedure:    1) Cystoscopy  2) Flexible ureteroscopy    3) Surgeon acquired percutaneous renal access   4) Percutaneous nephrolithotomy   5) Nephrostomy tube placement      Findings:    1). Normal cystoscopy  2)  Ureteroscopy revealed multiple proximal ureteral stones, bifid system and multiple intrarenal stones  3). Right Percutaneous renal acces obtained via bullseye technique, upper pole    4). Total stone burden 4cm, visually completely cleared   5). 18fr Stilwell catheter as nephrostomy tube     Narrative of events: The patient was brought to the operative suite. Anesthesia was induced and preoperative antibiotics were administered. The were prepped in split leg prone position with access to the urethra and to the flank. After appropriate pause and confirmation of antibiotic administration, cystoscopy was performed. Bladder was normal.  Right ureteral orifice was identified and cannulated with a 0.035 sensor wire. Stent was grasped and removed. A duel lumen was advanced and retrogarde pyelogram performed revealing multiple stones in the proximal ureter and moderate hydronephrosis. Second wire was advanced and an access sheath was advanced to the proximal ureter. Flexible ureteroscopy was then performed. This revealed multiple stones in the proximal ureter. These were pushed back into the kidney with the scope.       An upper pole calyx was identified that would be adequate for access. Bullseye technique was utilized to enter the calyx. This was observed both fluoroscopically and endoscopically. A paniagua wire was advanced and grasped with a basket with the flexible ureteroscopy and brought out of the urethra thus obtaining through and through access. A tigertail catheter was advanced over the wire all the way into the bladder and out the urethra to then exchange the wire for a super stiff. The balloon was advanced over the super stiff and confirmed just at the point of entry into the calyx endoscopically. The balloon was inflated and a 24 fr sheath was advanced over the balloon which was removed and rigid nephroscope was advanced. The ultrasonic lithotriptor was used to break up all the stone and remove it. Jayashree Vance was sent for analysis. Flexible nephroscopy was then performed to ensure no residual fragments. Retrograde flexible ureteroscopy was then performed to clear the ureter. Given the excellent patency of the ureter, we elected not to place a stent and use a nephrostomy tube. An 18fr butler was placed over the wire in the flank and 3cc placed into balloon. This was nicely positioned in the renal pelvis. Nephrostogram revealed prompt drainage down the ureter and no extravasation. The sheath was removed and there was minimal bleeding from the puncture site. Patient was awoken from anesthesia and tolerated the procedure well. There were then transferred to the recovery room in stable condition. Estimated Blood Loss: 50 CC     Anesthesia:  General                  Implants: * No implants in log *    Specimens: * No specimens in log *     Complications:  None    Plan:  1. CT in AM  2. Methylene blue into nephrostomy tube tomorrow and if butler turns blue, then remove the neph tube   3.  If no issues, home tomorrow with butler       Casa Amin MD  1/19/2021

## 2021-01-19 NOTE — ANESTHESIA PREPROCEDURE EVALUATION
Relevant Problems   No relevant active problems       Anesthetic History   No history of anesthetic complications            Review of Systems / Medical History  Patient summary reviewed and pertinent labs reviewed    Pulmonary            Asthma        Neuro/Psych   Within defined limits           Cardiovascular    Hypertension              Exercise tolerance: >4 METS  Comments: Medical History  Chronic pain  Hypertension  Prediabetes  Fatty liver  Paraplegia (HCC)  Vitamin D deficiency  Neurogenic bladder  Bacteriuria  Urinary incontinence     GI/Hepatic/Renal           Liver disease     Endo/Other    Diabetes    Obesity     Other Findings              Physical Exam    Airway  Mallampati: II  TM Distance: 4 - 6 cm  Neck ROM: normal range of motion   Mouth opening: Normal     Cardiovascular  Regular rate and rhythm,  S1 and S2 normal,  no murmur, click, rub, or gallop  Rhythm: regular  Rate: normal         Dental  No notable dental hx       Pulmonary  Breath sounds clear to auscultation               Abdominal  GI exam deferred       Other Findings            Anesthetic Plan    ASA: 4  Anesthesia type: general          Induction: Intravenous  Anesthetic plan and risks discussed with: Patient      I have informed the patient or their guardian of the nature and purpose of the type of anesthesia, the reasonable alternative anesthetic methods, pertinent foreseeable risks involved and the possibility of complications. I have explained that an alternative form of anesthesia may be required by unexpected conditions arising before or during the procedure. It is understood that general anesthesia may be required for safety or comfort. Questions have been answered to the satisfaction of the patient who accepts the risks and agrees to proceed as planned. The above anesthetic review of medical history, physical exam, tests, assessment, subsequent anesthetic plan and consent have been accomplished pre-procedure.

## 2021-01-19 NOTE — ANESTHESIA POSTPROCEDURE EVALUATION
Procedure(s):  RIGHT PERCUTANEOUS NEPHROSTOMY TUBE PLACEMENT/NEPHROLITHOTOMY/UTETEROSCOPY/DOUBLE J STENT/C-ARM/HOLMIUM LASER. general    Anesthesia Post Evaluation      Multimodal analgesia: multimodal analgesia used between 6 hours prior to anesthesia start to PACU discharge  Patient location during evaluation: bedside  Patient participation: complete - patient participated  Level of consciousness: awake  Pain score: 0  Pain management: adequate  Airway patency: patent  Anesthetic complications: no  Cardiovascular status: stable  Respiratory status: acceptable  Hydration status: acceptable  Post anesthesia nausea and vomiting:  controlled  Final Post Anesthesia Temperature Assessment:  Normothermia (36.0-37.5 degrees C)      INITIAL Post-op Vital signs:   Vitals Value Taken Time   /82 01/19/21 1517   Temp 36 °C (96.8 °F) 01/19/21 1412   Pulse 63 01/19/21 1520   Resp 18 01/19/21 1520   SpO2 100 % 01/19/21 1520   Vitals shown include unvalidated device data.

## 2021-01-19 NOTE — PERIOP NOTES
Patient's  called and update given to him regarding bed assignment for overnight stay is pending. Will order regular diet tray as per Dr. Chau Talavera.

## 2021-01-20 ENCOUNTER — APPOINTMENT (OUTPATIENT)
Dept: CT IMAGING | Age: 71
End: 2021-01-20
Attending: UROLOGY
Payer: MEDICARE

## 2021-01-20 VITALS
DIASTOLIC BLOOD PRESSURE: 73 MMHG | TEMPERATURE: 97 F | HEART RATE: 90 BPM | OXYGEN SATURATION: 93 % | WEIGHT: 170 LBS | HEIGHT: 62 IN | RESPIRATION RATE: 16 BRPM | SYSTOLIC BLOOD PRESSURE: 121 MMHG | BODY MASS INDEX: 31.28 KG/M2

## 2021-01-20 LAB
ANION GAP SERPL CALC-SCNC: 4 MMOL/L (ref 3–18)
BUN SERPL-MCNC: 11 MG/DL (ref 7–18)
BUN/CREAT SERPL: 17 (ref 12–20)
CALCIUM SERPL-MCNC: 9.2 MG/DL (ref 8.5–10.1)
CHLORIDE SERPL-SCNC: 107 MMOL/L (ref 100–111)
CO2 SERPL-SCNC: 28 MMOL/L (ref 21–32)
COVID-19 RAPID TEST, COVR: NOT DETECTED
CREAT SERPL-MCNC: 0.65 MG/DL (ref 0.6–1.3)
ERYTHROCYTE [DISTWIDTH] IN BLOOD BY AUTOMATED COUNT: 15.6 % (ref 11.6–14.5)
GLUCOSE SERPL-MCNC: 174 MG/DL (ref 74–99)
HCT VFR BLD AUTO: 38.5 % (ref 35–45)
HGB BLD-MCNC: 12.4 G/DL (ref 12–16)
MCH RBC QN AUTO: 30.2 PG (ref 24–34)
MCHC RBC AUTO-ENTMCNC: 32.2 G/DL (ref 31–37)
MCV RBC AUTO: 93.7 FL (ref 74–97)
PLATELET # BLD AUTO: 135 K/UL (ref 135–420)
PMV BLD AUTO: 9.3 FL (ref 9.2–11.8)
POTASSIUM SERPL-SCNC: 4.3 MMOL/L (ref 3.5–5.5)
RBC # BLD AUTO: 4.11 M/UL (ref 4.2–5.3)
SODIUM SERPL-SCNC: 139 MMOL/L (ref 136–145)
SOURCE, COVRS: NORMAL
SPECIMEN TYPE, XMCV1T: NORMAL
WBC # BLD AUTO: 6.2 K/UL (ref 4.6–13.2)

## 2021-01-20 PROCEDURE — 74011250636 HC RX REV CODE- 250/636: Performed by: UROLOGY

## 2021-01-20 PROCEDURE — 36415 COLL VENOUS BLD VENIPUNCTURE: CPT

## 2021-01-20 PROCEDURE — 2709999900 HC NON-CHARGEABLE SUPPLY

## 2021-01-20 PROCEDURE — 99218 HC RM OBSERVATION: CPT

## 2021-01-20 PROCEDURE — 85027 COMPLETE CBC AUTOMATED: CPT

## 2021-01-20 PROCEDURE — 80048 BASIC METABOLIC PNL TOTAL CA: CPT

## 2021-01-20 PROCEDURE — 74176 CT ABD & PELVIS W/O CONTRAST: CPT

## 2021-01-20 PROCEDURE — 74011250637 HC RX REV CODE- 250/637: Performed by: UROLOGY

## 2021-01-20 RX ADMIN — CIPROFLOXACIN HYDROCHLORIDE 500 MG: 500 TABLET, FILM COATED ORAL at 18:23

## 2021-01-20 RX ADMIN — Medication 10 ML: at 06:24

## 2021-01-20 RX ADMIN — METOPROLOL TARTRATE 12.5 MG: 25 TABLET, FILM COATED ORAL at 18:23

## 2021-01-20 RX ADMIN — PREGABALIN 75 MG: 75 CAPSULE ORAL at 09:40

## 2021-01-20 RX ADMIN — ACETAMINOPHEN 1000 MG: 500 TABLET ORAL at 15:09

## 2021-01-20 RX ADMIN — ACETAMINOPHEN 1000 MG: 500 TABLET ORAL at 03:14

## 2021-01-20 RX ADMIN — CIPROFLOXACIN HYDROCHLORIDE 500 MG: 500 TABLET, FILM COATED ORAL at 09:41

## 2021-01-20 RX ADMIN — OXYCODONE HYDROCHLORIDE 5 MG: 5 TABLET ORAL at 15:09

## 2021-01-20 RX ADMIN — Medication 10 ML: at 14:00

## 2021-01-20 RX ADMIN — METOPROLOL TARTRATE 12.5 MG: 25 TABLET, FILM COATED ORAL at 09:40

## 2021-01-20 RX ADMIN — OXYCODONE HYDROCHLORIDE 5 MG: 5 TABLET ORAL at 03:14

## 2021-01-20 RX ADMIN — SODIUM CHLORIDE 125 ML/HR: 900 INJECTION, SOLUTION INTRAVENOUS at 03:19

## 2021-01-20 RX ADMIN — ACETAMINOPHEN 1000 MG: 500 TABLET ORAL at 09:40

## 2021-01-20 RX ADMIN — PREGABALIN 75 MG: 75 CAPSULE ORAL at 15:09

## 2021-01-20 NOTE — PROGRESS NOTES
Discharge instructions given to patient verbally and written. Patient states an understanding of all instructions, medications, new prescriptions and follow up appointments. Dressing to right flank area CDI. Patient in stable condition. Waiting to hear from transport for discharge time.

## 2021-01-20 NOTE — PROGRESS NOTES
Observation notice provided in writing to patient and/or caregiver as well as verbal explanation of the policy. Patients who are in outpatient status also receive the Observation notice    Discharge order noted for today. Orders reviewed. No needs identified at this time. Transport set up with [employer]. After spending nearly an hour on hold for various people, finally spoke with dispatcher who scheduled [facility] to  patient  Between 7 and 7:30 to take her home. Met with patient at bedside and let her know. Pt is highly anxious but reassurance was given.  remains available if needed.   Nita Encinas RN - Outcomes Manager  [phone]

## 2021-01-20 NOTE — ROUTINE PROCESS
TRANSFER - IN REPORT: 
 
Verbal report received from Usha (name) on Sridevi Rowland  being received from PACU(unit) for routine post - op   
 
Report consisted of patient’s Situation, Background, Assessment and  
Recommendations(SBAR).  
 
Information from the following report(s) SBAR, Kardex, Intake/Output, MAR and Recent Results was reviewed with the receiving nurse. 
 
Opportunity for questions and clarification was provided.   
 
Assessment completed upon patient’s arrival to unit and care assumed.  
 
 
 
 
Bedside shift change report given to Terra (oncoming nurse) by Tamara (offgoing nurse). Report included the following information SBAR, Kardex, Intake/Output, MAR and Recent Results.

## 2021-01-20 NOTE — PROGRESS NOTES
Problem: Risk for Spread of Infection  Goal: Prevent transmission of infectious organism to others  Description: Prevent the transmission of infectious organisms to other patients, staff members, and visitors. Outcome: Progressing Towards Goal     Problem: Patient Education:  Go to Education Activity  Goal: Patient/Family Education  Outcome: Progressing Towards Goal     Problem: Falls - Risk of  Goal: *Absence of Falls  Description: Document Green Salvia Fall Risk and appropriate interventions in the flowsheet. Outcome: Progressing Towards Goal  Note: Fall Risk Interventions:            Medication Interventions: Assess postural VS orthostatic hypotension, Bed/chair exit alarm, Evaluate medications/consider consulting pharmacy    Elimination Interventions: Call light in reach, Patient to call for help with toileting needs, Toileting schedule/hourly rounds    History of Falls Interventions: Bed/chair exit alarm, Consult care management for discharge planning, Door open when patient unattended, Evaluate medications/consider consulting pharmacy, Investigate reason for fall, Room close to nurse's station, Vital signs minimum Q4HRs X 24 hrs (comment for end date)         Problem: Patient Education: Go to Patient Education Activity  Goal: Patient/Family Education  Outcome: Progressing Towards Goal     Problem: Pressure Injury - Risk of  Goal: *Prevention of pressure injury  Description: Document Orlando Scale and appropriate interventions in the flowsheet. Outcome: Progressing Towards Goal  Note: Pressure Injury Interventions:  Sensory Interventions: Assess changes in LOC, Assess need for specialty bed, Float heels, Keep linens dry and wrinkle-free, Maintain/enhance activity level, Minimize linen layers, Monitor skin under medical devices, Pad between skin to skin, Pressure redistribution bed/mattress (bed type), Turn and reposition approx.  every two hours (pillows and wedges if needed)         Activity Interventions: Assess need for specialty bed, Pressure redistribution bed/mattress(bed type)    Mobility Interventions: Assess need for specialty bed, Float heels, HOB 30 degrees or less, Pressure redistribution bed/mattress (bed type), Turn and reposition approx. every two hours(pillow and wedges)    Nutrition Interventions: Document food/fluid/supplement intake    Friction and Shear Interventions: Foam dressings/transparent film/skin sealants, HOB 30 degrees or less, Lift sheet, Lift team/patient mobility team, Minimize layers, Transferring/repositioning devices                Problem: Patient Education: Go to Patient Education Activity  Goal: Patient/Family Education  Outcome: Progressing Towards Goal     Problem: Impaired Skin Integrity/Pressure Injury Treatment  Goal: *Improvement of Existing Pressure Injury  Outcome: Progressing Towards Goal  Goal: *Prevention of pressure injury  Description: Document Orlando Scale and appropriate interventions in the flowsheet. Outcome: Progressing Towards Goal  Note: Pressure Injury Interventions:  Sensory Interventions: Assess changes in LOC, Assess need for specialty bed, Float heels, Keep linens dry and wrinkle-free, Maintain/enhance activity level, Minimize linen layers, Monitor skin under medical devices, Pad between skin to skin, Pressure redistribution bed/mattress (bed type), Turn and reposition approx. every two hours (pillows and wedges if needed)         Activity Interventions: Assess need for specialty bed, Pressure redistribution bed/mattress(bed type)    Mobility Interventions: Assess need for specialty bed, Float heels, HOB 30 degrees or less, Pressure redistribution bed/mattress (bed type), Turn and reposition approx.  every two hours(pillow and wedges)    Nutrition Interventions: Document food/fluid/supplement intake    Friction and Shear Interventions: Foam dressings/transparent film/skin sealants, HOB 30 degrees or less, Lift sheet, Lift team/patient mobility team, Minimize layers, Transferring/repositioning devices                Problem: Patient Education: Go to Patient Education Activity  Goal: Patient/Family Education  Outcome: Progressing Towards Goal     Problem: Ostomy Care  Goal: *Patient pouching appliance will fit properly and maintain integrity at least three to five days  Description: Infection control procedures (eg, clean dressings, clean gloves, hand washing, precautions to isolate wound from contamination, sterile instruments used for wound debridement) should be implemented.   Outcome: Progressing Towards Goal  Goal: *Acceptance of change in body image  Outcome: Progressing Towards Goal     Problem: Patient Education: Go to Patient Education Activity  Goal: Patient/Family Education  Outcome: Progressing Towards Goal

## 2021-01-20 NOTE — DISCHARGE SUMMARY
Discharge Summary     Patient ID:  Rafa Izaguirre  [id number]  47 y.o.  1950    Admit Date: 1/19/2021    Discharge Date: 1/20/2021    * Admission Diagnoses: Karen Sharmin in kidney [N20.0]    * Discharge Diagnoses:    Hospital Problems as of 1/20/2021 Date Reviewed: 11/11/2020          Codes Class Noted - Resolved POA    Stone in kidney ICD-10-CM: N20.0  ICD-9-CM: 592.0  1/19/2021 - Present Unknown               Admission Condition: Good    * Discharge Condition: good    * Procedures: Procedure(s):  RIGHT PERCUTANEOUS NEPHROSTOMY TUBE PLACEMENT/NEPHROLITHOTOMY/UTETEROSCOPY/DOUBLE J STENT/C-ARM/HOLMIUM LASER    [facility] Course:   S/p right PCNL. No intraoperative complications, 50 cc's estimated blood loss. No acute events overnight. Normal hospital course for this procedure. Patient doing well this AM. Methylene blue into nephrostomy tube this AM, butler output blue. Nephrostomy removed at bedside without difficulty. She will be discharged with a butler catheter in place. Consults: None    Significant Diagnostic Studies:       Labs: Results:   Chemistry    Recent Labs     01/20/21  0214   *      K 4.3      CO2 28   BUN 11   CREA 0.65   CA 9.2   AGAP 4   BUCR 17      CBC w/Diff Recent Labs     01/20/21  0214   WBC 6.2   RBC 4.11*   HGB 12.4   HCT 38.5         Cultures No results for input(s): CULT in the last 72 hours.   All Micro Results     Procedure Component Value Units Date/Time    CULTURE, URINE [[id number]] Collected: 01/19/21 1409    Order Status: Completed Updated: 01/19/21 8075            Urinalysis Color   Date Value Ref Range Status   07/16/2018 YELLOW/STRAW   Final     Comment:     Color Reference Range: Straw, Yellow or Dark Yellow     Appearance   Date Value Ref Range Status   07/16/2018 CLOUDY (A) CLEAR   Final     Specific gravity   Date Value Ref Range Status   07/16/2018 1.011 1.003 - 1.030   Final     pH (UA)   Date Value Ref Range Status   07/16/2018 7.5 5.0 - 8.0   Final     Protein   Date Value Ref Range Status   07/16/2018 NEGATIVE  NEG mg/dL Final     Ketone   Date Value Ref Range Status   07/16/2018 NEGATIVE  NEG mg/dL Final     Bilirubin   Date Value Ref Range Status   07/16/2018 NEGATIVE  NEG   Final     Blood   Date Value Ref Range Status   07/16/2018 NEGATIVE  NEG   Final     Urobilinogen   Date Value Ref Range Status   07/16/2018 0.2 0.2 - 1.0 EU/dL Final     Nitrites   Date Value Ref Range Status   07/16/2018 NEGATIVE  NEG   Final     Leukocyte Esterase   Date Value Ref Range Status   07/16/2018 SMALL (A) NEG   Final     Potassium   Date Value Ref Range Status   01/20/2021 4.3 3.5 - 5.5 mmol/L Final     Creatinine   Date Value Ref Range Status   01/20/2021 0.65 0.6 - 1.3 MG/DL Final     BUN   Date Value Ref Range Status   01/20/2021 11 7.0 - 18 MG/DL Final      PSA No results for input(s): PSA in the last 72 hours. Coagulation No results found for: PTP, INR, APTT, INREXT        * Disposition: Home    Discharge Medications:   Current Discharge Medication List      START taking these medications    Details   oxyCODONE-acetaminophen (PERCOCET) 5-325 mg per tablet Take 1 Tab by mouth every four (4) hours as needed for Pain for up to 3 days. Max Daily Amount: 6 Tabs. Qty: 10 Tab, Refills: 0    Associated Diagnoses: Stone in kidney         CONTINUE these medications which have CHANGED    Details   Eliquis 5 mg tablet Take 1 Tab by mouth daily. Restart in 1 week  Qty: 30 Tab, Refills: 0         CONTINUE these medications which have NOT CHANGED    Details   acetaminophen (TylenoL) 325 mg tablet Take 500 mg by mouth every four (4) hours as needed for Pain. ciprofloxacin HCl (CIPRO) 500 mg tablet Take 1 Tab by mouth two (2) times a day for 14 days. Qty: 28 Tab, Refills: 0      sennosides (Senna) 8.6 mg cap Take  by mouth.      pregabalin (LYRICA) 100 mg capsule Take 1 Cap by mouth nightly. Max Daily Amount: 100 mg.   Qty: 30 Cap, Refills: 2    Associated Diagnoses: Radicular pain of right lower back      acetaminophen (TYLENOL) 650 mg/20.3 mL solution Take  by mouth every six (6) hours as needed for Fever. albuterol (PROVENTIL HFA, VENTOLIN HFA, PROAIR HFA) 90 mcg/actuation inhaler Take  by inhalation every four (4) hours as needed. ondansetron (ZOFRAN ODT) 4 mg disintegrating tablet Take 1 Tab by mouth every eight (8) hours as needed for Nausea. Qty: 90 Tab, Refills: 0      amitriptyline (ELAVIL) 10 mg tablet Take 1 Tab by mouth nightly. Qty: 90 Tab, Refills: 3      hydrocortisone (PREPARATION H HYDROCORTISONE) 1 % topical cream Apply  to affected area two (2) times a day. use thin layer  Qty: 30 g, Refills: 6      carboxymethylcellulose sodium (REFRESH TEARS) 0.5 % drop ophthalmic solution Administer 1 Drop to both eyes two (2) times a day. Qty: 30 mL, Refills: 11      loratadine (CLARITIN) 10 mg tablet Take 1 Tab by mouth daily as needed for Allergies. Qty: 90 Tab, Refills: 3      metoprolol tartrate (LOPRESSOR) 25 mg tablet Take 0.5 Tabs by mouth two (2) times a day. Qty: 90 Tab, Refills: 1    Associated Diagnoses: Essential hypertension      HYDROcodone-acetaminophen (NORCO) 5-325 mg per tablet Take  by mouth. albuterol-ipratropium (DUO-NEB) 2.5 mg-0.5 mg/3 ml nebu 3 mL by Nebulization route as directed. VITAMIN C 500 mg tablet Take 1 Tab by mouth daily. cholecalciferol (VITAMIN D3) 1,000 unit tablet Take 1 Tab by mouth daily. Qty: 90 Tab, Refills: 3             * Follow-up Care/Patient Instructions: Activity: Activity as tolerated  Diet: Regular Diet  Wound Care: Keep wound clean and dry  Wound care discussed with patient.      Follow-up Information    None         PCP:  Wolf Bermudez MD  Referring Provider: Kirsten López MD    Follow up with: Dr. Aisha Knox as scheduled     Signed:  Samm Marker  [phone]    January 20, 2021 8:23 AM

## 2021-01-20 NOTE — ROUTINE PROCESS
Received report from [facility]. Pt AAOx3, NAD, breathing non labored, on room air, HOB up. IV site clean, dry and intact. King cath in place. Nephrostomy tube right side in place w/ pink urine. Bed at the lowest level on lock position, call bell w/i reach. Bed alarm on. Bedside and Verbal shift change report given to [facility] (oncoming nurse) by me (offgoing nurse). Report included the following information SBAR, Kardex, Procedure Summary, Intake/Output, MAR and Recent Results.

## 2021-01-20 NOTE — PROGRESS NOTES
IV discontinued per orders, small pressure dressing applied to site. Patient belongings packed for discharge. Patient refused bath said she had one last night before bed.

## 2021-01-20 NOTE — PROGRESS NOTES
Problem: Risk for Spread of Infection  Goal: Prevent transmission of infectious organism to others  Description: Prevent the transmission of infectious organisms to other patients, staff members, and visitors. Outcome: Resolved/Met     Problem: Patient Education:  Go to Education Activity  Goal: Patient/Family Education  Outcome: Resolved/Met     Problem: Falls - Risk of  Goal: *Absence of Falls  Description: Document Oz Fall Risk and appropriate interventions in the flowsheet. Outcome: Resolved/Met     Problem: Patient Education: Go to Patient Education Activity  Goal: Patient/Family Education  Outcome: Resolved/Met     Problem: Pressure Injury - Risk of  Goal: *Prevention of pressure injury  Description: Document Orlando Scale and appropriate interventions in the flowsheet. Outcome: Resolved/Met     Problem: Patient Education: Go to Patient Education Activity  Goal: Patient/Family Education  Outcome: Resolved/Met     Problem: Impaired Skin Integrity/Pressure Injury Treatment  Goal: *Improvement of Existing Pressure Injury  Outcome: Resolved/Met  Goal: *Prevention of pressure injury  Description: Document Orlando Scale and appropriate interventions in the flowsheet. Outcome: Resolved/Met     Problem: Patient Education: Go to Patient Education Activity  Goal: Patient/Family Education  Outcome: Resolved/Met     Problem: Ostomy Care  Goal: *Patient pouching appliance will fit properly and maintain integrity at least three to five days  Description: Infection control procedures (eg, clean dressings, clean gloves, hand washing, precautions to isolate wound from contamination, sterile instruments used for wound debridement) should be implemented.   Outcome: Resolved/Met  Goal: *Acceptance of change in body image  Outcome: Resolved/Met     Problem: Patient Education: Go to Patient Education Activity  Goal: Patient/Family Education  Outcome: Resolved/Met

## 2021-01-21 NOTE — PROGRESS NOTES
Patient discharged to home, via transport on stretcher. Patient in stable condition. Belongings with patient. Arm bands removed and shredded per protocol.

## 2021-01-22 LAB
CARBONATE APATITE: 20 %
COLOR STONE: NORMAL
COMMENT, 519994: NORMAL
COMPOSITION, 120440: NORMAL
DISCLAIMER, STO32L: NORMAL
PHOTO, 120675: NORMAL
PLEASE NOTE, 130422: NORMAL
SIZE STONE: NORMAL MM
SPECIMEN SOURCE: NORMAL
TRI-PHOS MFR STONE: 80 %
WT STONE: 80 MG

## 2021-08-03 PROBLEM — N20.0 KIDNEY STONE: Status: RESOLVED | Noted: 2021-01-12 | Resolved: 2021-08-03

## 2023-05-28 NOTE — PATIENT INSTRUCTIONS
You have been referred to orthopedic surgery. Please call one of the preferred providers listed below and schedule your appointment. Once you have scheduled your appointment, please call the office at [phone] and leave the details of your appointment (provider you will be seeing, appointment date and time) on the voice mail.     [address] [facility]3], [facility] [address]      Spine: Dr. Angeline Dunaway, Dr. Song Almanza  Hand/shoulder: Dr. Delano Anton, Dr. Martha King  Foot/Ankle: Dr. Priscilla Hernandez, Dr. Dayana Mata  Knee/Hip: Dr. Deepa Casarez, Dr. Dayana Mata DC instructions

## (undated) DEVICE — DEVICE SECUREMENT AD W/ TRICOT ANCHR PD FOR F LTX SIL CATH

## (undated) DEVICE — SOLUTION IV 1000ML 0.9% SOD CHL

## (undated) DEVICE — BAG DRAINAGE CUST DISP

## (undated) DEVICE — POSITIONER HD REST FOAM CMFRT TCH

## (undated) DEVICE — GDWIRE AMPLTZ SUP STF 0.038IN -- BX/5

## (undated) DEVICE — CHECK-FLO ADAPTER: Brand: CHECK-FLO

## (undated) DEVICE — STER SINGLE BASIN SET W/BOWLS: Brand: CARDINAL HEALTH

## (undated) DEVICE — TAPE ADH CLTH SILK H2O REPELLENT CURAD

## (undated) DEVICE — PERC NCIRCLE, NITINOL TIPLESS STONE EXTRACTOR: Brand: PERC NCIRCLE

## (undated) DEVICE — TIGER TAIL

## (undated) DEVICE — KIT CLN UP BON SECOURS MARYV

## (undated) DEVICE — TUBING IRRIG L77IN DIA0.241IN L BOR FOR CYSTO W/ NVENT

## (undated) DEVICE — Device

## (undated) DEVICE — MEDI-VAC NON-CONDUCTIVE SUCTION TUBING: Brand: CARDINAL HEALTH

## (undated) DEVICE — 3M™ TEGADERM™ TRANSPARENT FILM DRESSING FRAME STYLE, 1629, 8 IN X 12 IN (20 CM X 30 CM), 10/CT 8CT/CASE: Brand: 3M™ TEGADERM™

## (undated) DEVICE — CYSTO/BLADDER IRRIGATION SET, REGULATING CLAMP

## (undated) DEVICE — SHEATH URET 36CM OD15FR ID13FR HYDRPHLC 2 TAPR NAVIGATOR

## (undated) DEVICE — SYR 10ML LUER LOK 1/5ML GRAD --

## (undated) DEVICE — LUB SURG MEDC STRL 2OZ TUBE MC -- MEDICHOICE

## (undated) DEVICE — NITINOL STONE RETRIEVAL BASKET: Brand: ZERO TIP

## (undated) DEVICE — GUIDEWIRE VASC L180CM DIA0.038IN L6CM TIP PTFE S STL STR

## (undated) DEVICE — DUAL LUMEN URETERAL CATHETER

## (undated) DEVICE — DRAPE PERC PROC W335XL196CM LINGEMAN

## (undated) DEVICE — CATH URETH FOL 2W SH 18FRX5ML --

## (undated) DEVICE — GAUZE,SPONGE,4"X4",16PLY,STRL,LF,10/TRAY: Brand: MEDLINE

## (undated) DEVICE — SYR LR LCK 1ML GRAD NSAF 30ML --

## (undated) DEVICE — DRAPE TWL SURG 16X26IN BLU ORB04] ALLCARE INC]

## (undated) DEVICE — Device: Brand: SINGLE USE GRASPING FORCEPS FG-253SX

## (undated) DEVICE — SUTURE VCRL SZ 3-0 L27IN ABSRB UD L26MM SH 1/2 CIR J416H

## (undated) DEVICE — SOLUTION IRRIG 3000ML 0.9% SOD CHL FLX CONT 0797208] ICU MEDICAL INC]

## (undated) DEVICE — TRAY PREP DRY W/ PREM GLV 2 APPL 6 SPNG 2 UNDPD 1 OVERWRAP

## (undated) DEVICE — SYR 20ML LL STRL LF --

## (undated) DEVICE — TABLE COVER: Brand: CONVERTORS

## (undated) DEVICE — PERCUTANEOUS ACCESS NEEDLE: Brand: NAVIGUIDE

## (undated) DEVICE — DRAINBAG,ANTI-REFLUX TOWER,L/F,2000ML,LL: Brand: MEDLINE

## (undated) DEVICE — PACK PROCEDURE SURG MAJ W/ BASIN LF

## (undated) DEVICE — COVER SURG EQUIP W36XL28IN W/ E BND ARND BTM

## (undated) DEVICE — STERILE LATEX POWDER-FREE SURGICAL GLOVESWITH NITRILE COATING: Brand: PROTEXIS

## (undated) DEVICE — KIT PRB L445MM DIA3.4MM GRN W/ STONE CTCH DISP SWISS

## (undated) DEVICE — LEGGINGS, PAIR, 31X48, STERILE: Brand: MEDLINE

## (undated) DEVICE — GDWIRE URET ANG BNTSN .035X150 -- ZIPWIRE BENSTON STD

## (undated) DEVICE — PREP SKN CHLRAPRP APL 26ML STR --

## (undated) DEVICE — GDWIRE UROL STR 150CM FLX TP -- BX/5 SENSOR

## (undated) DEVICE — SINGLE-USE DIGITAL FLEXIBLE URETEROSCOPE: Brand: LITHOVUE

## (undated) DEVICE — Z DUP USE 2565107 PACK SURG PROC LEG CYSTO T-DRAPE REINF TBL CVR HND TWL

## (undated) DEVICE — DERMABOND SKIN ADH 0.7ML -- DERMABOND ADVANCED 12/BX

## (undated) DEVICE — 4-PORT MANIFOLD: Brand: NEPTUNE 2

## (undated) DEVICE — GAUZE,SPONGE,4"X4",12PLY,STERILE,LF,2'S: Brand: MEDLINE

## (undated) DEVICE — GOWN,PREVENTION PLUS,XLN/XL,ST,24/CS: Brand: MEDLINE

## (undated) DEVICE — SUTURE MCRYL SZ 4-0 L27IN ABSRB UD L24MM PS-1 3/8 CIR PRIM Y935H

## (undated) DEVICE — HIGH PRESSURE NEPHROSTOMY BALLOON CATHETER KIT: Brand: NEPHROMAX KIT